# Patient Record
Sex: MALE | Race: WHITE | Employment: OTHER | URBAN - NONMETROPOLITAN AREA
[De-identification: names, ages, dates, MRNs, and addresses within clinical notes are randomized per-mention and may not be internally consistent; named-entity substitution may affect disease eponyms.]

---

## 2017-09-29 ENCOUNTER — TRANSFERRED RECORDS (OUTPATIENT)
Dept: HEALTH INFORMATION MANAGEMENT | Facility: HOSPITAL | Age: 77
End: 2017-09-29

## 2018-05-23 RX ORDER — TAMSULOSIN HYDROCHLORIDE 0.4 MG/1
0.4 CAPSULE ORAL DAILY
Status: ON HOLD | COMMUNITY
End: 2021-01-01

## 2018-05-23 RX ORDER — LISINOPRIL AND HYDROCHLOROTHIAZIDE 12.5; 2 MG/1; MG/1
1 TABLET ORAL DAILY
Status: ON HOLD | COMMUNITY
End: 2021-01-01

## 2018-05-31 ENCOUNTER — HOSPITAL ENCOUNTER (OUTPATIENT)
Facility: HOSPITAL | Age: 78
Discharge: HOME | End: 2018-05-31
Attending: INTERNAL MEDICINE | Admitting: INTERNAL MEDICINE
Payer: COMMERCIAL

## 2018-05-31 ENCOUNTER — ANESTHESIA (OUTPATIENT)
Dept: SURGERY | Facility: HOSPITAL | Age: 78
End: 2018-05-31
Payer: COMMERCIAL

## 2018-05-31 ENCOUNTER — SURGERY (OUTPATIENT)
Age: 78
End: 2018-05-31

## 2018-05-31 ENCOUNTER — ANESTHESIA EVENT (OUTPATIENT)
Dept: SURGERY | Facility: HOSPITAL | Age: 78
End: 2018-05-31
Payer: COMMERCIAL

## 2018-05-31 VITALS
SYSTOLIC BLOOD PRESSURE: 183 MMHG | OXYGEN SATURATION: 90 % | BODY MASS INDEX: 31.06 KG/M2 | HEIGHT: 75 IN | RESPIRATION RATE: 18 BRPM | DIASTOLIC BLOOD PRESSURE: 125 MMHG | TEMPERATURE: 98 F | WEIGHT: 249.8 LBS

## 2018-05-31 LAB — GLUCOSE BLDC GLUCOMTR-MCNC: 153 MG/DL (ref 70–99)

## 2018-05-31 PROCEDURE — 40000306 ZZH STATISTIC PRE PROC ASSESS II: Performed by: INTERNAL MEDICINE

## 2018-05-31 PROCEDURE — 99100 ANES PT EXTEME AGE<1 YR&>70: CPT | Performed by: NURSE ANESTHETIST, CERTIFIED REGISTERED

## 2018-05-31 PROCEDURE — 82962 GLUCOSE BLOOD TEST: CPT

## 2018-05-31 PROCEDURE — 45385 COLONOSCOPY W/LESION REMOVAL: CPT | Performed by: ANESTHESIOLOGY

## 2018-05-31 PROCEDURE — 25000125 ZZHC RX 250: Performed by: NURSE ANESTHETIST, CERTIFIED REGISTERED

## 2018-05-31 PROCEDURE — 71000027 ZZH RECOVERY PHASE 2 EACH 15 MINS: Performed by: INTERNAL MEDICINE

## 2018-05-31 PROCEDURE — 45385 COLONOSCOPY W/LESION REMOVAL: CPT | Performed by: NURSE ANESTHETIST, CERTIFIED REGISTERED

## 2018-05-31 PROCEDURE — 27210794 ZZH OR GENERAL SUPPLY STERILE: Performed by: INTERNAL MEDICINE

## 2018-05-31 PROCEDURE — 36000050 ZZH SURGERY LEVEL 2 1ST 30 MIN: Performed by: INTERNAL MEDICINE

## 2018-05-31 PROCEDURE — 25000128 H RX IP 250 OP 636: Performed by: NURSE ANESTHETIST, CERTIFIED REGISTERED

## 2018-05-31 PROCEDURE — 37000008 ZZH ANESTHESIA TECHNICAL FEE, 1ST 30 MIN: Performed by: INTERNAL MEDICINE

## 2018-05-31 PROCEDURE — 25000128 H RX IP 250 OP 636: Performed by: ANESTHESIOLOGY

## 2018-05-31 PROCEDURE — 88305 TISSUE EXAM BY PATHOLOGIST: CPT | Mod: TC | Performed by: INTERNAL MEDICINE

## 2018-05-31 RX ORDER — ONDANSETRON 2 MG/ML
4 INJECTION INTRAMUSCULAR; INTRAVENOUS EVERY 30 MIN PRN
Status: DISCONTINUED | OUTPATIENT
Start: 2018-05-31 | End: 2018-05-31 | Stop reason: HOSPADM

## 2018-05-31 RX ORDER — LIDOCAINE HYDROCHLORIDE 20 MG/ML
INJECTION, SOLUTION INFILTRATION; PERINEURAL PRN
Status: DISCONTINUED | OUTPATIENT
Start: 2018-05-31 | End: 2018-05-31

## 2018-05-31 RX ORDER — ALBUTEROL SULFATE 0.83 MG/ML
2.5 SOLUTION RESPIRATORY (INHALATION) EVERY 4 HOURS PRN
Status: DISCONTINUED | OUTPATIENT
Start: 2018-05-31 | End: 2018-05-31 | Stop reason: HOSPADM

## 2018-05-31 RX ORDER — MEPERIDINE HYDROCHLORIDE 25 MG/ML
12.5 INJECTION INTRAMUSCULAR; INTRAVENOUS; SUBCUTANEOUS
Status: DISCONTINUED | OUTPATIENT
Start: 2018-05-31 | End: 2018-05-31 | Stop reason: HOSPADM

## 2018-05-31 RX ORDER — SODIUM CHLORIDE, SODIUM LACTATE, POTASSIUM CHLORIDE, CALCIUM CHLORIDE 600; 310; 30; 20 MG/100ML; MG/100ML; MG/100ML; MG/100ML
INJECTION, SOLUTION INTRAVENOUS CONTINUOUS
Status: DISCONTINUED | OUTPATIENT
Start: 2018-05-31 | End: 2018-05-31 | Stop reason: HOSPADM

## 2018-05-31 RX ORDER — HYDRALAZINE HYDROCHLORIDE 20 MG/ML
2.5-5 INJECTION INTRAMUSCULAR; INTRAVENOUS EVERY 10 MIN PRN
Status: DISCONTINUED | OUTPATIENT
Start: 2018-05-31 | End: 2018-05-31 | Stop reason: HOSPADM

## 2018-05-31 RX ORDER — PROMETHAZINE HYDROCHLORIDE 25 MG/ML
12.5 INJECTION, SOLUTION INTRAMUSCULAR; INTRAVENOUS
Status: DISCONTINUED | OUTPATIENT
Start: 2018-05-31 | End: 2018-05-31 | Stop reason: HOSPADM

## 2018-05-31 RX ORDER — PROPOFOL 10 MG/ML
INJECTION, EMULSION INTRAVENOUS PRN
Status: DISCONTINUED | OUTPATIENT
Start: 2018-05-31 | End: 2018-05-31

## 2018-05-31 RX ORDER — ONDANSETRON 4 MG/1
4 TABLET, ORALLY DISINTEGRATING ORAL EVERY 30 MIN PRN
Status: DISCONTINUED | OUTPATIENT
Start: 2018-05-31 | End: 2018-05-31 | Stop reason: HOSPADM

## 2018-05-31 RX ORDER — ONDANSETRON 2 MG/ML
4 INJECTION INTRAMUSCULAR; INTRAVENOUS
Status: DISCONTINUED | OUTPATIENT
Start: 2018-05-31 | End: 2018-05-31 | Stop reason: HOSPADM

## 2018-05-31 RX ORDER — DEXAMETHASONE SODIUM PHOSPHATE 4 MG/ML
4 INJECTION, SOLUTION INTRA-ARTICULAR; INTRALESIONAL; INTRAMUSCULAR; INTRAVENOUS; SOFT TISSUE EVERY 10 MIN PRN
Status: DISCONTINUED | OUTPATIENT
Start: 2018-05-31 | End: 2018-05-31 | Stop reason: HOSPADM

## 2018-05-31 RX ORDER — FENTANYL CITRATE 50 UG/ML
25-50 INJECTION, SOLUTION INTRAMUSCULAR; INTRAVENOUS
Status: DISCONTINUED | OUTPATIENT
Start: 2018-05-31 | End: 2018-05-31 | Stop reason: HOSPADM

## 2018-05-31 RX ORDER — LIDOCAINE 40 MG/G
CREAM TOPICAL
Status: DISCONTINUED | OUTPATIENT
Start: 2018-05-31 | End: 2018-05-31 | Stop reason: HOSPADM

## 2018-05-31 RX ORDER — NALOXONE HYDROCHLORIDE 0.4 MG/ML
.1-.4 INJECTION, SOLUTION INTRAMUSCULAR; INTRAVENOUS; SUBCUTANEOUS
Status: DISCONTINUED | OUTPATIENT
Start: 2018-05-31 | End: 2018-05-31 | Stop reason: HOSPADM

## 2018-05-31 RX ADMIN — PROPOFOL 10 MG: 10 INJECTION, EMULSION INTRAVENOUS at 09:28

## 2018-05-31 RX ADMIN — LIDOCAINE HYDROCHLORIDE 40 MG: 20 INJECTION, SOLUTION INFILTRATION; PERINEURAL at 09:19

## 2018-05-31 RX ADMIN — SODIUM CHLORIDE, POTASSIUM CHLORIDE, SODIUM LACTATE AND CALCIUM CHLORIDE: 600; 310; 30; 20 INJECTION, SOLUTION INTRAVENOUS at 09:11

## 2018-05-31 RX ADMIN — PROPOFOL 20 MG: 10 INJECTION, EMULSION INTRAVENOUS at 09:22

## 2018-05-31 RX ADMIN — PROPOFOL 20 MG: 10 INJECTION, EMULSION INTRAVENOUS at 09:32

## 2018-05-31 RX ADMIN — PROPOFOL 20 MG: 10 INJECTION, EMULSION INTRAVENOUS at 09:34

## 2018-05-31 RX ADMIN — PROPOFOL 10 MG: 10 INJECTION, EMULSION INTRAVENOUS at 09:26

## 2018-05-31 RX ADMIN — PROPOFOL 20 MG: 10 INJECTION, EMULSION INTRAVENOUS at 09:30

## 2018-05-31 RX ADMIN — PROPOFOL 20 MG: 10 INJECTION, EMULSION INTRAVENOUS at 09:36

## 2018-05-31 RX ADMIN — PROPOFOL 20 MG: 10 INJECTION, EMULSION INTRAVENOUS at 09:20

## 2018-05-31 RX ADMIN — PROPOFOL 10 MG: 10 INJECTION, EMULSION INTRAVENOUS at 09:24

## 2018-05-31 RX ADMIN — PROPOFOL 30 MG: 10 INJECTION, EMULSION INTRAVENOUS at 09:19

## 2018-05-31 RX ADMIN — PROPOFOL 20 MG: 10 INJECTION, EMULSION INTRAVENOUS at 09:35

## 2018-05-31 NOTE — OR NURSING
Pateint discharged to home .  Madelin score 20. Pain level 0/10.  Discharged from unit via walking .

## 2018-05-31 NOTE — IP AVS SNAPSHOT
MRN:[id number]                      After Visit Summary   5/31/2018    Florentin Reyes    MRN: [id number]           Thank you!     Thank you for choosing [facility] for your care. Our goal is always to provide you with excellent care. Hearing back from our patients is one way we can continue to improve our services. Please take a few minutes to complete the written survey that you may receive in the mail after you visit with us. Thank you!        Patient Information     Date Of Birth          1940        About your hospital stay     You were admitted on:  May 31, 2018 You last received care in the:  [facility] Preop/Phase II    You were discharged on:  May 31, 2018       Who to Call     For medical emergencies, please call 911.  For non-urgent questions about your medical care, please call your primary care provider or clinic, [phone]  For questions related to your surgery, please call your surgery clinic        Attending Provider     Provider Specialty    Roldan Vila MD Gastroenterology       Primary Care Provider Office Phone # Fax #    Pradeep Fuller [phone]      Further instructions from your care team           INSTRUCTIONS AFTER COLONOSCOPY    WHEN YOU ARE BACK HOME:    Plan to rest for an hour or two after you get home.    You may have some cramping or pressure until you pass gas.    You may resume your regular medications.    Eat a small, light meal at first, and then gradually return to normal meal sizes.  If you had a polyp removed:    Slight bleeding may occur.  You may have a slight blood stain on the toilet paper after a bowel movement.    To lessen the chance of bleeding, avoid heavy exercise for ONE WEEK.  This includes heavy lifting, vigorous sport activities, and heavy physical labor.  You may resume your normal sexual activity.      Avoid aspirin or aspirin products if instructed by your doctor.    WHAT TO WATCH FOR:  Problems rarely occur after the  "exam; however, it is important for you to watch for early signs of possible problems.  If you have     Unusual pain in your abdomen    Nausea and vomiting that persists    Excessive bleeding    Black or bloody bowel movements    Fever or temperature above 100.6 F  Please call your doctor ([facility] [phone]) or go to the nearest hospital emergency room.    Post-Anesthesia Patient Instructions    IMMEDIATELY FOLLOWING SURGERY:  Do not drive or operate machinery for the first twenty four hours after surgery.  Do not make any important decisions for twenty four hours after surgery or while taking narcotic pain medications or sedatives.  If you develop intractable nausea and vomiting or a severe headache please notify your doctor immediately.    FOLLOW-UP:  Please make an appointment with your surgeon as instructed. You do not need to follow up with anesthesia unless specifically instructed to do so.    WOUND CARE INSTRUCTIONS (if applicable):  Keep a dry clean dressing on the anesthesia/puncture wound site if there is drainage.  Once the wound has quit draining you may leave it open to air.  Generally you should leave the bandage intact for twenty four hours unless there is drainage.  If the epidural site drains for more than 36-48 hours please call the anesthesia department.    QUESTIONS?:  Please feel free to call your physician or the hospital  if you have any questions, and they will be happy to assist you.       Pending Results     No orders found from 5/29/2018 to 6/1/2018.            Admission Information     Date & Time Provider Department Dept. Phone    5/31/2018 Roldan Vila MD HI Preop/Phase [phone]      Your Vitals Were     Blood Pressure Temperature Respirations Height Weight Pulse Oximetry    191/106 98  F (36.7  C) (Oral) 18 1.905 m (6' 3\") 113.3 kg (249 lb 12.8 oz) 96%    BMI (Body Mass Index)                   31.22 kg/m2           MyChart Information     MyChart lets you " "send messages to your doctor, view your test results, renew your prescriptions, schedule appointments and more. To sign up, go to [email or web address] . Click on \"Log in\" on the left side of the screen, which will take you to the Welcome page. Then click on \"Sign up Now\" on the right side of the page.     You will be asked to enter the access code listed below, as well as some personal information. Please follow the directions to create your username and password.     Your access code is: [email or web address]  Expires: 2018  9:54 AM     Your access code will  in 90 days. If you need help or a new code, please call your [facility] clinic or [phone].        Care EveryWhere ID     This is your Care EveryWhere ID. This could be used by other organizations to access your [facility] medical records  [id number]        Equal Access to Services     NOREEN DOBSON : Yan Eaton, wadimple franco, qaash kaalmajamie nathan, rani coello . So [facility] [phone].    ATENCIÓN: Si habla español, tiene a neal disposición servicios gratuitos de asistencia lingüística. Jordi al [phone].    We comply with applicable federal civil rights laws and [place] laws. We do not discriminate on the basis of race, color, national origin, age, disability, sex, sexual orientation, or gender identity.               Review of your medicines      CONTINUE these medicines which have NOT CHANGED        Dose / Directions    FLOMAX 0.4 MG capsule   Generic drug:  tamsulosin        Dose:  0.4 mg   Take 0.4 mg by mouth daily   Refills:  0       GLIPIZIDE PO        Dose:  5 mg   Take 5 mg by mouth every morning (before breakfast)   Refills:  0       insulin glargine 100 UNIT/ML injection   Commonly known as:  LANTUS        Dose:  40 Units   Inject 40 Units Subcutaneous every morning   Refills:  0       lisinopril-hydrochlorothiazide 20-12.5 MG per tablet   Commonly known as:  PRINZIDE/ZESTORETIC        " Dose:  1 tablet   Take 1 tablet by mouth daily   Refills:  0       METFORMIN HCL PO        Dose:  1000 mg   Take 1,000 mg by mouth 2 times daily (with meals)   Refills:  0       RISPERDAL PO        Dose:  0.5 mg   Take 0.5 mg by mouth 2 times daily   Refills:  0       SIMVASTATIN PO        Dose:  40 mg   Take 40 mg by mouth At Bedtime   Refills:  0                Protect others around you: Learn how to safely use, store and throw away your medicines at www.disposemymeds.org.             Medication List: This is a list of all your medications and when to take them. Check marks below indicate your daily home schedule. Keep this list as a reference.      Medications           Morning Afternoon Evening Bedtime As Needed    FLOMAX 0.4 MG capsule   Take 0.4 mg by mouth daily   Generic drug:  tamsulosin                                GLIPIZIDE PO   Take 5 mg by mouth every morning (before breakfast)                                insulin glargine 100 UNIT/ML injection   Commonly known as:  LANTUS   Inject 40 Units Subcutaneous every morning                                lisinopril-hydrochlorothiazide 20-12.5 MG per tablet   Commonly known as:  PRINZIDE/ZESTORETIC   Take 1 tablet by mouth daily                                METFORMIN HCL PO   Take 1,000 mg by mouth 2 times daily (with meals)                                RISPERDAL PO   Take 0.5 mg by mouth 2 times daily                                SIMVASTATIN PO   Take 40 mg by mouth At Bedtime

## 2018-05-31 NOTE — DISCHARGE INSTRUCTIONS
INSTRUCTIONS AFTER COLONOSCOPY    WHEN YOU ARE BACK HOME:    Plan to rest for an hour or two after you get home.    You may have some cramping or pressure until you pass gas.    You may resume your regular medications.    Eat a small, light meal at first, and then gradually return to normal meal sizes.  If you had a polyp removed:    Slight bleeding may occur.  You may have a slight blood stain on the toilet paper after a bowel movement.    To lessen the chance of bleeding, avoid heavy exercise for ONE WEEK.  This includes heavy lifting, vigorous sport activities, and heavy physical labor.  You may resume your normal sexual activity.      Avoid aspirin or aspirin products if instructed by your doctor.    WHAT TO WATCH FOR:  Problems rarely occur after the exam; however, it is important for you to watch for early signs of possible problems.  If you have     Unusual pain in your abdomen    Nausea and vomiting that persists    Excessive bleeding    Black or bloody bowel movements    Fever or temperature above 100.6 F  Please call your doctor ([facility] [phone]) or go to the nearest hospital emergency room.    Post-Anesthesia Patient Instructions    IMMEDIATELY FOLLOWING SURGERY:  Do not drive or operate machinery for the first twenty four hours after surgery.  Do not make any important decisions for twenty four hours after surgery or while taking narcotic pain medications or sedatives.  If you develop intractable nausea and vomiting or a severe headache please notify your doctor immediately.    FOLLOW-UP:  Please make an appointment with your surgeon as instructed. You do not need to follow up with anesthesia unless specifically instructed to do so.    WOUND CARE INSTRUCTIONS (if applicable):  Keep a dry clean dressing on the anesthesia/puncture wound site if there is drainage.  Once the wound has quit draining you may leave it open to air.  Generally you should leave the bandage intact for twenty four  hours unless there is drainage.  If the epidural site drains for more than 36-48 hours please call the anesthesia department.    QUESTIONS?:  Please feel free to call your physician or the hospital  if you have any questions, and they will be happy to assist you.

## 2018-05-31 NOTE — ANESTHESIA PREPROCEDURE EVALUATION
Anesthesia Evaluation     . Pt has had prior anesthetic.            ROS/MED HX    ENT/Pulmonary:  - neg pulmonary ROS     Neurologic:     (+)other neuro hearing loss    Cardiovascular:     (+) Dyslipidemia, hypertension-range: not on beta blocker, ---. : . . . :. .       METS/Exercise Tolerance:     Hematologic:  - neg hematologic  ROS       Musculoskeletal:   (+) arthritis, , , -       GI/Hepatic:     (+) bowel prep,       Renal/Genitourinary:     (+) BPH,       Endo:     (+) type II DM Using insulin Normal glucose range: 153 Obesity, .      Psychiatric:     (+) psychiatric history bipolar      Infectious Disease:  - neg infectious disease ROS       Malignancy:      - no malignancy   Other:    - neg other ROS                 Physical Exam      Airway   Mallampati: III  TM distance: >3 FB  Neck ROM: full    Dental   (+) upper dentures and missing    Cardiovascular   Rhythm and rate: regular and normal      Pulmonary    breath sounds clear to auscultation                    Anesthesia Plan      History & Physical Review  History and physical reviewed and following examination; no interval change.    ASA Status:  2 .    NPO Status:  > 8 hours    Plan for MAC with Intravenous and Propofol induction. Maintenance will be TIVA.  Reason for MAC:  Chronic cardiopulmonary disease (G9) and Other - see comments  PONV prophylaxis:  Ondansetron (or other 5HT-3)  Surgeon requests deep sedation. Patient has advanced age > 72.  Will provide MAC. Primary Physician H&P on 11/3/2017 Identifiable in chart and my own H&P from today used for preop creation      Postoperative Care  Postoperative pain management:  IV analgesics.      Consents  Anesthetic plan, risks, benefits and alternatives discussed with:  Patient..                          .

## 2018-05-31 NOTE — IP AVS SNAPSHOT
HI Preop/Phase II    [address] [address]    Phone:  [phone]                                       After Visit Summary   5/31/2018    Florentin Reyes    MRN: [id number]           After Visit Summary Signature Page     I have received my discharge instructions, and my questions have been answered. I have discussed any challenges I see with this plan with the nurse or doctor.    ..........................................................................................................................................  Patient/Patient Representative Signature      ..........................................................................................................................................  Patient Representative Print Name and Relationship to Patient    ..................................................               ................................................  Date                                            Time    ..........................................................................................................................................  Reviewed by Signature/Title    ...................................................              ..............................................  Date                                                            Time

## 2018-05-31 NOTE — ANESTHESIA POSTPROCEDURE EVALUATION
Patient: Florentin Reyes    Procedure(s):  COLONOSCOPY WITH POLYPECTOMY - Wound Class: II-Clean Contaminated    Diagnosis:PERSONAL HX COLON POLYPS  Diagnosis Additional Information: No value filed.    Anesthesia Type:  MAC    Note:  Anesthesia Post Evaluation    Patient location during evaluation: Phase 2 and Bedside  Patient participation: Able to fully participate in evaluation  Level of consciousness: awake and alert  Pain management: adequate  Airway patency: patent  Cardiovascular status: acceptable  Respiratory status: acceptable  Hydration status: stable  PONV: none     Anesthetic complications: None          Last vitals:  Vitals:    05/31/18 0950 05/31/18 0955 05/31/18 1000   BP: (!) 191/106 (!) 163/111 (!) 150/103   Resp:      Temp:      SpO2: 96% 93% 92%         Electronically Signed By: Bryan Henry MD  May 31, 2018  10:10 AM

## 2018-05-31 NOTE — CONSULTS
Pre-Endoscopy History and Physical     Florentin Reyes MRN# [id number]   YOB: 1940 Age: 77 year old     Primary care provider: Pradeep Fuller  Type of Endoscopy: Colonoscopy with possible biopsy, possible polypectomy  Reason for Procedure: History of polyps  Type of Anesthesia Anticipated: MAC    HPI:    Florentin is a 77 year old male who will be undergoing the above procedure.      A history and physical has been performed. The patient's medications and allergies have been reviewed. The risks and benefits of the procedure and the sedation options and risks were discussed with the patient.  All questions were answered and informed consent was obtained.      He denies a personal or family history of anesthesia complications or bleeding disorders.     There is no problem list on file for this patient.       No past medical history on file.     History reviewed. No pertinent surgical history.    Social History   Substance Use Topics     Smoking status: Not on file     Smokeless tobacco: Not on file     Alcohol use Not on file       History reviewed. No pertinent family history.    Prior to Admission medications    Medication Sig Start Date End Date Taking? Authorizing Provider   insulin glargine (LANTUS SOLOSTAR) 100 UNIT/ML pen Inject 40 Units Subcutaneous every morning   Yes Reported, Patient   lisinopril-hydrochlorothiazide (PRINZIDE/ZESTORETIC) 20-12.5 MG per tablet Take 1 tablet by mouth daily   Yes Reported, Patient   METFORMIN HCL PO Take 1,000 mg by mouth 2 times daily (with meals)   Yes Reported, Patient   RisperiDONE (RISPERDAL PO) Take 0.5 mg by mouth 2 times daily   Yes Reported, Patient   SIMVASTATIN PO Take 40 mg by mouth At Bedtime   Yes Reported, Patient   tamsulosin (FLOMAX) 0.4 MG capsule Take 0.4 mg by mouth daily   Yes Reported, Patient   GLIPIZIDE PO Take 5 mg by mouth every morning (before breakfast)     Reported, Patient       No Known Allergies     REVIEW OF SYSTEMS:   5 point ROS  negative except as noted above in HPI, including Gen., Resp., CV, GI &  system review.    PHYSICAL EXAM:   There were no vitals taken for this visit. There is no height or weight on file to calculate BMI.   GENERAL APPEARANCE: alert, and oriented  MENTAL STATUS: alert  AIRWAY EXAM: Mallampatti Class II (visualization of the soft palate, fauces, and uvula)  RESP: lungs clear to auscultation - no rales, rhonchi or wheezes  CV: regular rates and rhythm  DIAGNOSTICS:    Not indicated    IMPRESSION   ASA Class 2 - Mild systemic disease    PLAN:   Plan for Colonoscopy with possible biopsy, possible polypectomy. We discussed the risks, benefits and alternatives and the patient wished to proceed.    The above has been forwarded to the consulting provider.      Signed Electronically by: Roldan Vila MD  May 31, 2018

## 2018-05-31 NOTE — ANESTHESIA CARE TRANSFER NOTE
Patient: Florentin Reyes    Procedure(s):  COLONOSCOPY WITH POLYPECTOMY - Wound Class: II-Clean Contaminated    Diagnosis: PERSONAL HX COLON POLYPS  Diagnosis Additional Information: No value filed.    Anesthesia Type:   MAC     Note:  Airway :Nasal Cannula  Patient transferred to:Phase II  Handoff Report: Identifed the Patient, Identified the Reponsible Provider, Reviewed the pertinent medical history, Discussed the surgical course, Reviewed Intra-OP anesthesia mangement and issues during anesthesia, Set expectations for post-procedure period and Allowed opportunity for questions and acknowledgement of understanding      Vitals: (Last set prior to Anesthesia Care Transfer)    CRNA VITALS  5/31/2018 0912 - 5/31/2018 0943      5/31/2018             Resp Rate (set): 8                Electronically Signed By: VIET Hurtado CRNA  May 31, 2018  9:43 AM

## 2018-05-31 NOTE — OP NOTE
Flroentin Reyes MRN# [id number]   YOB: 1940 Age: 77 year old      Date of Admission:  5/31/2018    Primary care provider: Pradeep Fuller    PREOPERATIVE DIAGNOSIS:  History of polyps       POSTOPERATIVE DIAGNOSIS:  Rectal polyps X 2, internal hemorrhoids         PROCEDURE:  Colonoscopy         INDICATIONS:  Screening colonoscopy.       SURGEON: Roldan Vila MD    DESCRIPTION OF PROCEDURE: Florentin Reyes was brought into the endoscopy suite and placed in the left lateral decubitus position. After preprocedural pause and attended monitored anesthesia was administered, the external anus was inspected and was normal. Digital rectal exam was normal. The colonoscope was inserted and advanced under direct visualization to the level of the cecum which was identified by the appendiceal orifice and the ileocecal valve. The prep was good. Upon slow withdrawal of the colonoscope, approximately 95% of the mucosa was directly visualized. There were two - 3-4 mm polyps in the rectum removed with cold forceps.  The rest of the colon was without mucosal abnormality. There was no evidence of further polyps, diverticula, inflammation, bleeding or AVMs. Retroflexion of the rectum revealed internal hemorrhoids. . The extra air was removed from the colon, and the colonoscope withdrawn. The patient tolerated the procedure well and was taken to postanesthesia care unit.     Recommend the patient to return in 5 years for follow up colonoscopy as health permits.    Roldan Vila MD

## 2018-06-04 LAB — COPATH REPORT: NORMAL

## 2019-07-15 ENCOUNTER — HOSPITAL ENCOUNTER (OUTPATIENT)
Dept: MRI IMAGING | Facility: HOSPITAL | Age: 79
Discharge: HOME | End: 2019-07-15
Attending: FAMILY MEDICINE | Admitting: FAMILY MEDICINE
Payer: COMMERCIAL

## 2019-07-15 DIAGNOSIS — R27.8 OTHER LACK OF COORDINATION: ICD-10-CM

## 2019-07-15 PROCEDURE — 25500064 ZZH RX 255 OP 636: Performed by: RADIOLOGY

## 2019-07-15 PROCEDURE — 70553 MRI BRAIN STEM W/O & W/DYE: CPT | Mod: TC

## 2019-07-15 PROCEDURE — A9585 GADOBUTROL INJECTION: HCPCS | Performed by: RADIOLOGY

## 2019-07-15 RX ORDER — GADOBUTROL 604.72 MG/ML
10 INJECTION INTRAVENOUS ONCE
Status: COMPLETED | OUTPATIENT
Start: 2019-07-15 | End: 2019-07-15

## 2019-07-15 RX ADMIN — GADOBUTROL 10 ML: 604.72 INJECTION INTRAVENOUS at 09:44

## 2020-01-01 ENCOUNTER — OFFICE VISIT (OUTPATIENT)
Dept: WOUND CARE | Facility: OTHER | Age: 80
End: 2020-01-01
Attending: CLINICAL NURSE SPECIALIST
Payer: COMMERCIAL

## 2020-01-01 ENCOUNTER — HOSPITAL ENCOUNTER (EMERGENCY)
Facility: HOSPITAL | Age: 80
Discharge: HOME | End: 2020-10-15
Attending: PHYSICIAN ASSISTANT | Admitting: PHYSICIAN ASSISTANT
Payer: COMMERCIAL

## 2020-01-01 ENCOUNTER — OFFICE VISIT (OUTPATIENT)
Dept: WOUND CARE | Facility: OTHER | Age: 80
End: 2020-01-01
Attending: NURSE PRACTITIONER
Payer: COMMERCIAL

## 2020-01-01 ENCOUNTER — HOSPITAL PATHOLOGY (OUTPATIENT)
Dept: OTHER | Facility: CLINIC | Age: 80
End: 2020-01-01

## 2020-01-01 ENCOUNTER — TELEPHONE (OUTPATIENT)
Dept: WOUND CARE | Facility: OTHER | Age: 80
End: 2020-01-01

## 2020-01-01 ENCOUNTER — OFFICE VISIT (OUTPATIENT)
Dept: DERMATOLOGY | Facility: OTHER | Age: 80
End: 2020-01-01
Attending: DERMATOLOGY
Payer: COMMERCIAL

## 2020-01-01 VITALS
WEIGHT: 260 LBS | DIASTOLIC BLOOD PRESSURE: 92 MMHG | HEART RATE: 97 BPM | BODY MASS INDEX: 32.33 KG/M2 | SYSTOLIC BLOOD PRESSURE: 154 MMHG | TEMPERATURE: 98 F | OXYGEN SATURATION: 96 % | HEIGHT: 75 IN

## 2020-01-01 VITALS
WEIGHT: 260 LBS | BODY MASS INDEX: 35.21 KG/M2 | DIASTOLIC BLOOD PRESSURE: 64 MMHG | SYSTOLIC BLOOD PRESSURE: 138 MMHG | HEART RATE: 90 BPM | TEMPERATURE: 97 F | HEIGHT: 72 IN | OXYGEN SATURATION: 98 %

## 2020-01-01 VITALS
SYSTOLIC BLOOD PRESSURE: 132 MMHG | TEMPERATURE: 96.9 F | OXYGEN SATURATION: 100 % | HEIGHT: 72 IN | DIASTOLIC BLOOD PRESSURE: 82 MMHG | WEIGHT: 260 LBS | BODY MASS INDEX: 35.21 KG/M2 | HEART RATE: 106 BPM

## 2020-01-01 VITALS
RESPIRATION RATE: 20 BRPM | DIASTOLIC BLOOD PRESSURE: 82 MMHG | HEART RATE: 102 BPM | OXYGEN SATURATION: 96 % | SYSTOLIC BLOOD PRESSURE: 136 MMHG | TEMPERATURE: 98.2 F

## 2020-01-01 VITALS
HEART RATE: 91 BPM | SYSTOLIC BLOOD PRESSURE: 122 MMHG | BODY MASS INDEX: 35.26 KG/M2 | DIASTOLIC BLOOD PRESSURE: 76 MMHG | TEMPERATURE: 96.7 F | WEIGHT: 260 LBS | OXYGEN SATURATION: 96 %

## 2020-01-01 VITALS — TEMPERATURE: 96.9 F | OXYGEN SATURATION: 98 % | HEART RATE: 96 BPM | RESPIRATION RATE: 18 BRPM

## 2020-01-01 VITALS
OXYGEN SATURATION: 95 % | BODY MASS INDEX: 32.33 KG/M2 | WEIGHT: 260 LBS | DIASTOLIC BLOOD PRESSURE: 78 MMHG | HEIGHT: 75 IN | TEMPERATURE: 98.4 F | SYSTOLIC BLOOD PRESSURE: 116 MMHG | HEART RATE: 106 BPM

## 2020-01-01 VITALS
HEART RATE: 93 BPM | SYSTOLIC BLOOD PRESSURE: 112 MMHG | DIASTOLIC BLOOD PRESSURE: 62 MMHG | TEMPERATURE: 97.9 F | OXYGEN SATURATION: 98 % | RESPIRATION RATE: 16 BRPM

## 2020-01-01 DIAGNOSIS — S31.819D OPEN WOUND OF RIGHT BUTTOCK, SUBSEQUENT ENCOUNTER: Primary | ICD-10-CM

## 2020-01-01 DIAGNOSIS — L30.9 ECZEMA, UNSPECIFIED TYPE: ICD-10-CM

## 2020-01-01 DIAGNOSIS — S31.819S: Primary | ICD-10-CM

## 2020-01-01 DIAGNOSIS — L89.313 PRESSURE INJURY OF RIGHT BUTTOCK, STAGE 3 (H): Primary | ICD-10-CM

## 2020-01-01 DIAGNOSIS — R21 RASH: ICD-10-CM

## 2020-01-01 DIAGNOSIS — S31.819S: ICD-10-CM

## 2020-01-01 DIAGNOSIS — B37.2 CANDIDIASIS OF SKIN: ICD-10-CM

## 2020-01-01 DIAGNOSIS — R21 RASH: Primary | ICD-10-CM

## 2020-01-01 DIAGNOSIS — R21 RASH AND NONSPECIFIC SKIN ERUPTION: ICD-10-CM

## 2020-01-01 DIAGNOSIS — L30.9 DERMATITIS: ICD-10-CM

## 2020-01-01 LAB
BACTERIA SPEC CULT: ABNORMAL
BACTERIA SPEC CULT: ABNORMAL
COPATH REPORT: NORMAL
COPATH REPORT: NORMAL
GRAM STN SPEC: ABNORMAL
GRAM STN SPEC: ABNORMAL
KOH PREP SPEC: ABNORMAL
KOH PREP SPEC: NORMAL
KOH PREP SPEC: NORMAL
SPECIMEN SOURCE: ABNORMAL
SPECIMEN SOURCE: NORMAL
SPECIMEN SOURCE: NORMAL

## 2020-01-01 PROCEDURE — G0463 HOSPITAL OUTPT CLINIC VISIT: HCPCS

## 2020-01-01 PROCEDURE — 99213 OFFICE O/P EST LOW 20 MIN: CPT | Performed by: NURSE PRACTITIONER

## 2020-01-01 PROCEDURE — G0463 HOSPITAL OUTPT CLINIC VISIT: HCPCS | Mod: 25

## 2020-01-01 PROCEDURE — 87070 CULTURE OTHR SPECIMN AEROBIC: CPT | Mod: ZL | Performed by: NURSE PRACTITIONER

## 2020-01-01 PROCEDURE — 87186 SC STD MICRODIL/AGAR DIL: CPT | Mod: ZL | Performed by: NURSE PRACTITIONER

## 2020-01-01 PROCEDURE — 99213 OFFICE O/P EST LOW 20 MIN: CPT | Performed by: CLINICAL NURSE SPECIALIST

## 2020-01-01 PROCEDURE — 11042 DBRDMT SUBQ TIS 1ST 20SQCM/<: CPT | Performed by: CLINICAL NURSE SPECIALIST

## 2020-01-01 PROCEDURE — 99214 OFFICE O/P EST MOD 30 MIN: CPT | Performed by: NURSE PRACTITIONER

## 2020-01-01 PROCEDURE — 11102 TANGNTL BX SKIN SINGLE LES: CPT | Performed by: DERMATOLOGY

## 2020-01-01 PROCEDURE — 87205 SMEAR GRAM STAIN: CPT | Mod: ZL | Performed by: NURSE PRACTITIONER

## 2020-01-01 PROCEDURE — 87220 TISSUE EXAM FOR FUNGI: CPT | Mod: 59 | Performed by: PHYSICIAN ASSISTANT

## 2020-01-01 PROCEDURE — 99213 OFFICE O/P EST LOW 20 MIN: CPT | Performed by: PHYSICIAN ASSISTANT

## 2020-01-01 PROCEDURE — 87077 CULTURE AEROBIC IDENTIFY: CPT | Mod: ZL | Performed by: NURSE PRACTITIONER

## 2020-01-01 PROCEDURE — 99213 OFFICE O/P EST LOW 20 MIN: CPT | Mod: 25 | Performed by: CLINICAL NURSE SPECIALIST

## 2020-01-01 PROCEDURE — 99202 OFFICE O/P NEW SF 15 MIN: CPT | Mod: 25 | Performed by: DERMATOLOGY

## 2020-01-01 PROCEDURE — 88305 TISSUE EXAM BY PATHOLOGIST: CPT | Mod: TC | Performed by: DERMATOLOGY

## 2020-01-01 PROCEDURE — 88312 SPECIAL STAINS GROUP 1: CPT | Mod: TC | Performed by: DERMATOLOGY

## 2020-01-01 PROCEDURE — G0463 HOSPITAL OUTPT CLINIC VISIT: HCPCS | Mod: 25,27

## 2020-01-01 RX ORDER — MULTIVIT WITH MINERALS/LUTEIN
1 TABLET ORAL DAILY
Qty: 90 TABLET | Refills: 3 | Status: ON HOLD | OUTPATIENT
Start: 2020-01-01 | End: 2021-01-01

## 2020-01-01 RX ORDER — LATANOPROST 50 UG/ML
1 SOLUTION/ DROPS OPHTHALMIC AT BEDTIME
Status: ON HOLD | COMMUNITY
End: 2021-01-01

## 2020-01-01 RX ORDER — IBUPROFEN 200 MG
CAPSULE ORAL
Status: ON HOLD | COMMUNITY
Start: 2020-01-29 | End: 2021-01-01

## 2020-01-01 RX ORDER — MUPIROCIN 20 MG/G
OINTMENT TOPICAL 2 TIMES DAILY
Qty: 30 G | Refills: 1 | Status: ON HOLD | OUTPATIENT
Start: 2020-01-01 | End: 2021-01-01

## 2020-01-01 RX ORDER — LANCETS 21 GAUGE
EACH MISCELLANEOUS
Status: ON HOLD | COMMUNITY
Start: 2020-01-01 | End: 2021-01-01

## 2020-01-01 RX ORDER — TIMOLOL MALEATE 5 MG/ML
1 SOLUTION/ DROPS OPHTHALMIC 2 TIMES DAILY
Status: ON HOLD | COMMUNITY
Start: 2020-01-01 | End: 2021-01-01

## 2020-01-01 RX ORDER — CIMETIDINE 400 MG
400 TABLET ORAL 2 TIMES DAILY
Qty: 60 TABLET | Refills: 0 | Status: SHIPPED | OUTPATIENT
Start: 2020-01-01 | End: 2020-01-01

## 2020-01-01 RX ORDER — FLUCONAZOLE 150 MG/1
150 TABLET ORAL WEEKLY
Qty: 4 TABLET | Refills: 0 | Status: SHIPPED | OUTPATIENT
Start: 2020-01-01 | End: 2020-01-01

## 2020-01-01 RX ORDER — LORATADINE 10 MG/1
10 TABLET ORAL DAILY
Status: ON HOLD | COMMUNITY
Start: 2020-01-01 | End: 2021-01-01

## 2020-01-01 RX ORDER — INSULIN ASPART 100 [IU]/ML
0-11 INJECTION, SOLUTION INTRAVENOUS; SUBCUTANEOUS 3 TIMES DAILY
Status: ON HOLD | COMMUNITY
Start: 2020-01-01 | End: 2021-01-01

## 2020-01-01 RX ORDER — PEN NEEDLE, DIABETIC, SAFETY 30 GX3/16"
NEEDLE, DISPOSABLE MISCELLANEOUS
Status: ON HOLD | COMMUNITY
Start: 2020-03-26 | End: 2021-01-01

## 2020-01-01 RX ORDER — SULFAMETHOXAZOLE/TRIMETHOPRIM 800-160 MG
TABLET ORAL
COMMUNITY
Start: 2020-09-16 | End: 2020-01-01

## 2020-01-01 RX ORDER — MUPIROCIN 20 MG/G
OINTMENT TOPICAL 2 TIMES DAILY
COMMUNITY
End: 2020-01-01

## 2020-01-01 RX ORDER — TIMOLOL MALEATE 2.5 MG/ML
1 SOLUTION/ DROPS OPHTHALMIC 2 TIMES DAILY
Status: ON HOLD | COMMUNITY
End: 2021-01-01

## 2020-01-01 RX ORDER — DOXYCYCLINE 100 MG/1
100 CAPSULE ORAL 2 TIMES DAILY
Qty: 14 CAPSULE | Refills: 0 | Status: SHIPPED | OUTPATIENT
Start: 2020-01-01 | End: 2020-01-01

## 2020-01-01 RX ORDER — NYSTATIN AND TRIAMCINOLONE ACETONIDE 100000; 1 [USP'U]/G; MG/G
OINTMENT TOPICAL 2 TIMES DAILY PRN
Status: ON HOLD | COMMUNITY
End: 2021-01-01

## 2020-01-01 RX ORDER — INFLUENZA A VIRUS A/VICTORIA/2454/2019 IVR-207 (H1N1) ANTIGEN (PROPIOLACTONE INACTIVATED), INFLUENZA A VIRUS A/HONG KONG/2671/2019 IVR-208 (H3N2) ANTIGEN (PROPIOLACTONE INACTIVATED), INFLUENZA B VIRUS B/VICTORIA/705/2018 BVR-11 ANTIGEN (PROPIOLACTONE INACTIVATED), INFLUENZA B VIRUS B/PHUKET/3073/2013 BVR-1B ANTIGEN (PROPIOLACTONE INACTIVATED) 15; 15; 15; 15 UG/.5ML; UG/.5ML; UG/.5ML; UG/.5ML
INJECTION, SUSPENSION INTRAMUSCULAR
Status: ON HOLD | COMMUNITY
Start: 2020-01-01 | End: 2021-01-01

## 2020-01-01 RX ORDER — ISOPROPYL ALCOHOL 70 ML/100ML
SWAB TOPICAL
Status: ON HOLD | COMMUNITY
Start: 2020-08-11 | End: 2021-01-01

## 2020-01-01 RX ORDER — TRIAMCINOLONE ACETONIDE 1 MG/G
OINTMENT TOPICAL
Qty: 454 G | Refills: 3 | Status: ON HOLD | OUTPATIENT
Start: 2020-01-01 | End: 2021-01-01

## 2020-01-01 RX ORDER — TRIAMCINOLONE ACETONIDE 1 MG/G
CREAM TOPICAL 2 TIMES DAILY
Qty: 85.2 G | Refills: 0 | Status: SHIPPED | OUTPATIENT
Start: 2020-01-01 | End: 2021-01-01

## 2020-01-01 RX ORDER — ACETAMINOPHEN 325 MG/1
325 TABLET ORAL EVERY 4 HOURS PRN
Status: ON HOLD | COMMUNITY
End: 2021-01-01

## 2020-01-01 RX ORDER — SULFAMETHOXAZOLE/TRIMETHOPRIM 800-160 MG
1 TABLET ORAL 2 TIMES DAILY
Qty: 14 TABLET | Refills: 0 | Status: SHIPPED | OUTPATIENT
Start: 2020-01-01 | End: 2020-01-01

## 2020-01-01 RX ORDER — METFORMIN HCL 500 MG
TABLET, EXTENDED RELEASE 24 HR ORAL
Status: ON HOLD | COMMUNITY
Start: 2020-01-01 | End: 2021-01-01

## 2020-01-01 RX ORDER — ALBUTEROL SULFATE 108 UG/1
AEROSOL, METERED RESPIRATORY (INHALATION)
Status: ON HOLD | COMMUNITY
Start: 2020-01-01 | End: 2021-01-01

## 2020-01-01 RX ORDER — BLOOD-GLUCOSE METER
1 KIT MISCELLANEOUS 2 TIMES DAILY
Status: ON HOLD | COMMUNITY
Start: 2020-03-20 | End: 2021-01-01

## 2020-01-01 RX ORDER — LISINOPRIL 20 MG/1
20 TABLET ORAL DAILY
Status: ON HOLD | COMMUNITY
Start: 2020-01-01 | End: 2021-01-01

## 2020-01-01 RX ORDER — SIMVASTATIN 40 MG
TABLET ORAL
COMMUNITY
Start: 2020-01-01 | End: 2021-01-01

## 2020-01-01 ASSESSMENT — MIFFLIN-ST. JEOR
SCORE: 1937.35
SCORE: 1937.35
SCORE: 1984.98
SCORE: 1984.98

## 2020-01-01 ASSESSMENT — PAIN SCALES - GENERAL
PAINLEVEL: NO PAIN (0)

## 2020-01-01 ASSESSMENT — ENCOUNTER SYMPTOMS
FATIGUE: 0
CHILLS: 0
WOUND: 0
FEVER: 0

## 2020-09-11 ENCOUNTER — APPOINTMENT (OUTPATIENT)
Dept: GENERAL RADIOLOGY | Facility: HOSPITAL | Age: 80
End: 2020-09-11
Attending: PHYSICIAN ASSISTANT
Payer: COMMERCIAL

## 2020-09-11 ENCOUNTER — HOSPITAL ENCOUNTER (EMERGENCY)
Facility: HOSPITAL | Age: 80
Discharge: HOME | End: 2020-09-12
Attending: INTERNAL MEDICINE | Admitting: PHYSICIAN ASSISTANT
Payer: COMMERCIAL

## 2020-09-11 ENCOUNTER — APPOINTMENT (OUTPATIENT)
Dept: CT IMAGING | Facility: HOSPITAL | Age: 80
End: 2020-09-11
Attending: PHYSICIAN ASSISTANT
Payer: COMMERCIAL

## 2020-09-11 DIAGNOSIS — J18.9 PNEUMONIA: ICD-10-CM

## 2020-09-11 DIAGNOSIS — R32 URINARY INCONTINENCE: ICD-10-CM

## 2020-09-11 DIAGNOSIS — R21 RASH AND NONSPECIFIC SKIN ERUPTION: ICD-10-CM
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
## 2020-09-11 DIAGNOSIS — R73.9 HYPERGLYCEMIA: ICD-10-CM

## 2020-09-11 LAB
ALBUMIN SERPL-MCNC: 3.2 G/DL (ref 3.4–5)
ALBUMIN UR-MCNC: 10 MG/DL
ALP SERPL-CCNC: 92 U/L (ref 40–150)
ALT SERPL W P-5'-P-CCNC: 27 U/L (ref 0–70)
ANION GAP SERPL CALCULATED.3IONS-SCNC: 8 MMOL/L (ref 3–14)
APPEARANCE UR: CLEAR
AST SERPL W P-5'-P-CCNC: 19 U/L (ref 0–45)
BACTERIA #/AREA URNS HPF: ABNORMAL /HPF
BASOPHILS # BLD AUTO: 0 10E9/L (ref 0–0.2)
BASOPHILS NFR BLD AUTO: 0.3 %
BILIRUB SERPL-MCNC: 0.2 MG/DL (ref 0.2–1.3)
BILIRUB UR QL STRIP: NEGATIVE
BUN SERPL-MCNC: 24 MG/DL (ref 7–30)
CALCIUM SERPL-MCNC: 10 MG/DL (ref 8.5–10.1)
CHLORIDE SERPL-SCNC: 107 MMOL/L (ref 94–109)
CO2 SERPL-SCNC: 25 MMOL/L (ref 20–32)
COLOR UR AUTO: ABNORMAL
CREAT SERPL-MCNC: 0.87 MG/DL (ref 0.66–1.25)
DIFFERENTIAL METHOD BLD: ABNORMAL
EOSINOPHIL # BLD AUTO: 1 10E9/L (ref 0–0.7)
EOSINOPHIL NFR BLD AUTO: 8 %
ERYTHROCYTE [DISTWIDTH] IN BLOOD BY AUTOMATED COUNT: 13.2 % (ref 10–15)
GFR SERPL CREATININE-BSD FRML MDRD: 83 ML/MIN/{1.73_M2}
GLUCOSE BLDC GLUCOMTR-MCNC: 340 MG/DL (ref 70–99)
GLUCOSE BLDC GLUCOMTR-MCNC: 400 MG/DL (ref 70–99)
GLUCOSE SERPL-MCNC: 424 MG/DL (ref 70–99)
GLUCOSE UR STRIP-MCNC: >1000 MG/DL
HCT VFR BLD AUTO: 38.8 % (ref 40–53)
HGB BLD-MCNC: 13.4 G/DL (ref 13.3–17.7)
HGB UR QL STRIP: ABNORMAL
IMM GRANULOCYTES # BLD: 0.1 10E9/L (ref 0–0.4)
IMM GRANULOCYTES NFR BLD: 0.4 %
KETONES BLD-SCNC: 0.1 MMOL/L (ref 0–0.6)
KETONES UR STRIP-MCNC: NEGATIVE MG/DL
LACTATE BLD-SCNC: 2.6 MMOL/L (ref 0.7–2)
LACTATE BLD-SCNC: 3.2 MMOL/L (ref 0.7–2)
LEUKOCYTE ESTERASE UR QL STRIP: NEGATIVE
LYMPHOCYTES # BLD AUTO: 1.8 10E9/L (ref 0.8–5.3)
LYMPHOCYTES NFR BLD AUTO: 14.7 %
MCH RBC QN AUTO: 31.5 PG (ref 26.5–33)
MCHC RBC AUTO-ENTMCNC: 34.5 G/DL (ref 31.5–36.5)
MCV RBC AUTO: 91 FL (ref 78–100)
MONOCYTES # BLD AUTO: 0.9 10E9/L (ref 0–1.3)
MONOCYTES NFR BLD AUTO: 7.1 %
MUCOUS THREADS #/AREA URNS LPF: PRESENT /LPF
NEUTROPHILS # BLD AUTO: 8.4 10E9/L (ref 1.6–8.3)
NEUTROPHILS NFR BLD AUTO: 69.5 %
NITRATE UR QL: NEGATIVE
NRBC # BLD AUTO: 0 10*3/UL
NRBC BLD AUTO-RTO: 0 /100
PH UR STRIP: 5 PH (ref 4.7–8)
PLATELET # BLD AUTO: 227 10E9/L (ref 150–450)
POTASSIUM SERPL-SCNC: 4.3 MMOL/L (ref 3.4–5.3)
PROCALCITONIN SERPL-MCNC: <0.05 NG/ML
PROT SERPL-MCNC: 7.3 G/DL (ref 6.8–8.8)
RBC # BLD AUTO: 4.25 10E12/L (ref 4.4–5.9)
RBC #/AREA URNS AUTO: <1 /HPF (ref 0–2)
SODIUM SERPL-SCNC: 140 MMOL/L (ref 133–144)
SOURCE: ABNORMAL
SP GR UR STRIP: 1.03 (ref 1–1.03)
TROPONIN I SERPL-MCNC: 0.06 UG/L (ref 0–0.04)
TROPONIN I SERPL-MCNC: 0.08 UG/L (ref 0–0.04)
UROBILINOGEN UR STRIP-MCNC: NORMAL MG/DL (ref 0–2)
WBC # BLD AUTO: 12 10E9/L (ref 4–11)
WBC #/AREA URNS AUTO: 1 /HPF (ref 0–5)

## 2020-09-11 PROCEDURE — 87040 BLOOD CULTURE FOR BACTERIA: CPT | Performed by: PHYSICIAN ASSISTANT

## 2020-09-11 PROCEDURE — 82010 KETONE BODYS QUAN: CPT | Performed by: PHYSICIAN ASSISTANT

## 2020-09-11 PROCEDURE — 85379 FIBRIN DEGRADATION QUANT: CPT | Performed by: INTERNAL MEDICINE

## 2020-09-11 PROCEDURE — 84484 ASSAY OF TROPONIN QUANT: CPT | Performed by: PHYSICIAN ASSISTANT

## 2020-09-11 PROCEDURE — 85025 COMPLETE CBC W/AUTO DIFF WBC: CPT | Performed by: PHYSICIAN ASSISTANT

## 2020-09-11 PROCEDURE — 25800030 ZZH RX IP 258 OP 636: Performed by: PHYSICIAN ASSISTANT

## 2020-09-11 PROCEDURE — 99284 EMERGENCY DEPT VISIT MOD MDM: CPT | Mod: Z6 | Performed by: PHYSICIAN ASSISTANT

## 2020-09-11 PROCEDURE — 96361 HYDRATE IV INFUSION ADD-ON: CPT

## 2020-09-11 PROCEDURE — C9803 HOPD COVID-19 SPEC COLLECT: HCPCS

## 2020-09-11 PROCEDURE — 84145 PROCALCITONIN (PCT): CPT | Performed by: PHYSICIAN ASSISTANT

## 2020-09-11 PROCEDURE — 70450 CT HEAD/BRAIN W/O DYE: CPT | Mod: TC

## 2020-09-11 PROCEDURE — 36415 COLL VENOUS BLD VENIPUNCTURE: CPT | Performed by: PHYSICIAN ASSISTANT

## 2020-09-11 PROCEDURE — 93010 ELECTROCARDIOGRAM REPORT: CPT | Performed by: INTERNAL MEDICINE

## 2020-09-11 PROCEDURE — 96365 THER/PROPH/DIAG IV INF INIT: CPT

## 2020-09-11 PROCEDURE — 83605 ASSAY OF LACTIC ACID: CPT | Mod: 91 | Performed by: PHYSICIAN ASSISTANT

## 2020-09-11 PROCEDURE — 80053 COMPREHEN METABOLIC PANEL: CPT | Performed by: PHYSICIAN ASSISTANT

## 2020-09-11 PROCEDURE — 00000146 ZZHCL STATISTIC GLUCOSE BY METER IP

## 2020-09-11 PROCEDURE — 25000131 ZZH RX MED GY IP 250 OP 636 PS 637: Performed by: PHYSICIAN ASSISTANT

## 2020-09-11 PROCEDURE — 25000128 H RX IP 250 OP 636: Performed by: PHYSICIAN ASSISTANT

## 2020-09-11 PROCEDURE — 81001 URINALYSIS AUTO W/SCOPE: CPT | Performed by: PHYSICIAN ASSISTANT

## 2020-09-11 PROCEDURE — U0003 INFECTIOUS AGENT DETECTION BY NUCLEIC ACID (DNA OR RNA); SEVERE ACUTE RESPIRATORY SYNDROME CORONAVIRUS 2 (SARS-COV-2) (CORONAVIRUS DISEASE [COVID-19]), AMPLIFIED PROBE TECHNIQUE, MAKING USE OF HIGH THROUGHPUT TECHNOLOGIES AS DESCRIBED BY CMS-2020-01-R: HCPCS | Performed by: PHYSICIAN ASSISTANT

## 2020-09-11 PROCEDURE — 71045 X-RAY EXAM CHEST 1 VIEW: CPT | Mod: TC

## 2020-09-11 PROCEDURE — 99285 EMERGENCY DEPT VISIT HI MDM: CPT | Mod: 25

## 2020-09-11 PROCEDURE — 93005 ELECTROCARDIOGRAM TRACING: CPT

## 2020-09-11 RX ORDER — METOPROLOL SUCCINATE 50 MG/1
50 TABLET, EXTENDED RELEASE ORAL DAILY
Status: ON HOLD | COMMUNITY
End: 2021-01-01

## 2020-09-11 RX ORDER — DEXTROSE MONOHYDRATE 25 G/50ML
25-50 INJECTION, SOLUTION INTRAVENOUS
Status: DISCONTINUED | OUTPATIENT
Start: 2020-09-11 | End: 2020-09-12 | Stop reason: HOSPADM

## 2020-09-11 RX ORDER — NICOTINE POLACRILEX 4 MG
15-30 LOZENGE BUCCAL
Status: DISCONTINUED | OUTPATIENT
Start: 2020-09-11 | End: 2020-09-12 | Stop reason: HOSPADM

## 2020-09-11 RX ORDER — CEFTRIAXONE SODIUM 2 G/50ML
2 INJECTION, SOLUTION INTRAVENOUS ONCE
Status: COMPLETED | OUTPATIENT
Start: 2020-09-11 | End: 2020-09-11

## 2020-09-11 RX ADMIN — SODIUM CHLORIDE 1000 ML: 9 INJECTION, SOLUTION INTRAVENOUS at 20:48

## 2020-09-11 RX ADMIN — CEFTRIAXONE SODIUM 2 G: 2 INJECTION, SOLUTION INTRAVENOUS at 22:02

## 2020-09-11 RX ADMIN — SODIUM CHLORIDE 1000 ML: 9 INJECTION, SOLUTION INTRAVENOUS at 20:19

## 2020-09-11 RX ADMIN — INSULIN HUMAN 10 UNITS: 100 INJECTION, SOLUTION PARENTERAL at 21:09

## 2020-09-11 ASSESSMENT — ENCOUNTER SYMPTOMS
SHORTNESS OF BREATH: 0
NECK STIFFNESS: 0
ACTIVITY CHANGE: 0
CHEST TIGHTNESS: 0
NECK PAIN: 0
HEADACHES: 0
BRUISES/BLEEDS EASILY: 0
DIFFICULTY URINATING: 1
ABDOMINAL PAIN: 0
VOMITING: 0
NAUSEA: 0
SPEECH DIFFICULTY: 0
DIZZINESS: 0
LIGHT-HEADEDNESS: 0
APPETITE CHANGE: 0
FATIGUE: 0
FEVER: 0
CHILLS: 0

## 2020-09-12 VITALS
DIASTOLIC BLOOD PRESSURE: 94 MMHG | SYSTOLIC BLOOD PRESSURE: 148 MMHG | OXYGEN SATURATION: 97 % | TEMPERATURE: 98.3 F | HEART RATE: 105 BPM | RESPIRATION RATE: 16 BRPM

## 2020-09-12 LAB
D DIMER PPP FEU-MCNC: 1.3 UG/ML FEU (ref 0–0.5)
SARS-COV-2 RNA SPEC QL NAA+PROBE: NORMAL
SPECIMEN SOURCE: NORMAL

## 2020-09-12 NOTE — ED PROVIDER NOTES
"  History     Chief Complaint   Patient presents with     Hyperglycemia     The history is provided by the patient.     Florentin Reyes is a 79 year old male who presented to the emergency department via EMS for evaluation of a fall.  The patient lives at a local assisted living and tells me that he tripped over his feet in the elevator and lowered himself to the ground.  Denies any loss consciousness.  Denies any head injury.  He did however have some urinary incontinence but he also wears in adult brief.  EMS on scene reported the patient refused transport but after vital signs they reported a possible fever of 100.1 as well as blood sugar of greater than 400.  The patient does have a history of diabetes.  Reports that he has had a widespread rash for \"a while\" and that the staff \"put salve on it.\"  He denies any chest pain, cough, shortness of breath, abdominal pain, or other concerns.    Allergies:  No Known Allergies    Problem List:    There are no active problems to display for this patient.       Past Medical History:    No past medical history on file.    Past Surgical History:    Past Surgical History:   Procedure Laterality Date     COLONOSCOPY N/A 5/31/2018    Procedure: COLONOSCOPY;  COLONOSCOPY WITH POLYPECTOMY;  Surgeon: Roldan Vila MD;  Location: [facility]     COLONOSCOPY - HIM SCAN  09/01/2012    Colonoscopy, [facility]-NL-5 yr 9/2012       Family History:    No family history on file.    Social History:  Marital Status:  Single [1]  Social History     Tobacco Use     Smoking status: Not on file   Substance Use Topics     Alcohol use: Not on file     Drug use: Not on file        Medications:    GLIPIZIDE PO  insulin glargine (LANTUS SOLOSTAR) 100 UNIT/ML pen  lisinopril-hydrochlorothiazide (PRINZIDE/ZESTORETIC) 20-12.5 MG per tablet  METFORMIN HCL PO  metoprolol succinate ER (TOPROL-XL) 50 MG 24 hr tablet  RisperiDONE (RISPERDAL PO)  SIMVASTATIN PO  tamsulosin (FLOMAX) 0.4 MG capsule          Review of " Systems   Constitutional: Negative for activity change, appetite change, chills, fatigue and fever.   Respiratory: Negative for chest tightness and shortness of breath.    Cardiovascular: Negative for chest pain.   Gastrointestinal: Negative for abdominal pain, nausea and vomiting.   Genitourinary: Positive for difficulty urinating.   Musculoskeletal: Negative for neck pain and neck stiffness.   Skin: Positive for rash.   Neurological: Negative for dizziness, syncope, speech difficulty, light-headedness and headaches.   Hematological: Does not bruise/bleed easily.       Physical Exam   BP: 155/95  Pulse: 112  Temp: 98.2  F (36.8  C)  Resp: 20  SpO2: 96 %      Physical Exam  Vitals signs and nursing note reviewed.   Constitutional:       General: He is not in acute distress.     Appearance: Normal appearance. He is obese. He is not ill-appearing, toxic-appearing or diaphoretic.      Comments: Pleasant and talkative 79-year-old male found semireclined on the exam bed in no distress.   HENT:      Head: Normocephalic and atraumatic.   Pulmonary:      Effort: Pulmonary effort is normal.   Abdominal:      Palpations: Abdomen is soft.      Tenderness: There is no abdominal tenderness.   Skin:     General: Skin is warm and dry.      Capillary Refill: Capillary refill takes less than 2 seconds.      Findings: Rash present.      Comments: Diffuse maculopapular rash on the torso, buttocks, upper arms, and legs with discrete excoriations.  There is no ulcers, vesicles, purpura, or other concerns.   Neurological:      General: No focal deficit present.      Mental Status: He is alert and oriented to person, place, and time.         ED Course        Procedures          EKG shows a sinus tachycardia at a rate of 107.  Normal KS interval.  Normal QRS duration.  Normal QTC.  There is a slight left axis deviation of -31 degrees.  No concerning ST segments.  No concerning T waves.    Questionable right-sided opacity on chest  x-ray.    Critical Care time:  none               No results found for this or any previous visit (from the past 24 hour(s)).    Medications   0.9% sodium chloride BOLUS (0 mLs Intravenous Stopped 9/11/20 2045)   0.9% sodium chloride BOLUS (0 mLs Intravenous Stopped 9/11/20 2202)   insulin regular injection 10 Units (10 Units Subcutaneous Given 9/11/20 2109)   cefTRIAXone IN D5W (ROCEPHIN) intermittent infusion 2 g (0 g Intravenous Stopped 9/11/20 2259)       Assessments & Plan (with Medical Decision Making)   Patient was signed out to Dr. Vera at routine change of shift.     This document was prepared using a combination of typing and voice generated software.  While every attempt was made for accuracy, spelling and grammatical errors may exist.    I have reviewed the nursing notes.    I have reviewed the findings, diagnosis, plan and need for follow up with the patient.       Discharge Medication List as of 9/12/2020  1:08 AM          Final diagnoses:   Fall, initial encounter   Hyperglycemia   Pneumonia   Rash and nonspecific skin eruption   Urinary incontinence       9/11/2020   [facility]     Kelli Wheeler PA-C  09/16/20 1042

## 2020-09-12 NOTE — ED TRIAGE NOTES
Pt brought in by [facility]. Pt fell in an elevator, denies any pain. States he just slid to the floor. Blood sugar for  and temp 100.1.

## 2020-09-13 LAB
LABORATORY COMMENT REPORT: NORMAL
SARS-COV-2 RNA SPEC QL NAA+PROBE: NEGATIVE
SPECIMEN SOURCE: NORMAL

## 2020-09-13 NOTE — ED PROVIDER NOTES
Due to tachycardia, Ddimer high, LOC?  Plan was CTA chest to rule out PE and reevaluation , pt understood but refused any further workup and insisted leaving AMA

## 2020-09-17 LAB
BACTERIA SPEC CULT: NORMAL
BACTERIA SPEC CULT: NORMAL
Lab: NORMAL
Lab: NORMAL
SPECIMEN SOURCE: NORMAL
SPECIMEN SOURCE: NORMAL

## 2020-09-30 NOTE — PROGRESS NOTES
"SUBJECTIVE:  Florentin Reyes, 80 year old, male presents with the following Chief Complaint(s) with HPI to follow:   Chief Complaint   Patient presents with     WOUND CARE     NEW open wound of buttocks          HPI:  Florentin present today for the assessment and treatment of an ulcer to the right buttock.  He arrives with a bordered foam dressing over the wound.  He is unsure how or when the wound started. He tells me the staff have been treating it with \"a salve and band aid\".     I called and talked with the staff at [facility], where he resides.  The RN reports the wound was reported the first half of September 2020.  She tells me the skin was intact and white when it was reported and within the next four days it opened up and started draining.      Of note, he has a diffuse maculopapular rash on the buttocks, torso, bilateral thighs, and arms.  He denies itching at this time.  [facility] staff report he has seen his primary provider, Dr. Fuller for this complaint and he ordered Bactrim as well as Cerave.    No past medical history on file.    Past Surgical History:   Procedure Laterality Date     COLONOSCOPY N/A 5/31/2018    Procedure: COLONOSCOPY;  COLONOSCOPY WITH POLYPECTOMY;  Surgeon: Roldan Vila MD;  Location: [facility] OR     COLONOSCOPY - HIM SCAN  09/01/2012    Colonoscopy, [facility]-NL-[facility] yr 9/2012       No family history on file.    Social History     Tobacco Use     Smoking status: Never Smoker     Smokeless tobacco: Never Used   Substance Use Topics     Alcohol use: Not on file       Current Outpatient Medications   Medication Sig Dispense Refill     acetaminophen (TYLENOL) 325 MG tablet Take 325 mg by mouth every 4 hours as needed for mild pain       GLIPIZIDE PO Take 5 mg by mouth every morning (before breakfast)        insulin glargine (LANTUS SOLOSTAR) 100 UNIT/ML pen Inject 40 Units Subcutaneous every morning       latanoprost (XALATAN) 0.005 % ophthalmic solution Place 1 drop into both eyes daily At " bedtime       lisinopril-hydrochlorothiazide (PRINZIDE/ZESTORETIC) 20-12.5 MG per tablet Take 1 tablet by mouth daily       METFORMIN HCL PO Take 1,000 mg by mouth 2 times daily (with meals)       metoprolol succinate ER (TOPROL-XL) 50 MG 24 hr tablet Take 50 mg by mouth daily       mupirocin (BACTROBAN) 2 % external ointment Apply topically 2 times daily       nystatin-triamcinolone (MYCOLOG) 297246-6.1 UNIT/GM-% external ointment Apply topically 2 times daily prn       RisperiDONE (RISPERDAL PO) Take 0.5 mg by mouth 2 times daily       SIMVASTATIN PO Take 40 mg by mouth At Bedtime       tamsulosin (FLOMAX) 0.4 MG capsule Take 0.4 mg by mouth daily       timolol maleate (TIMOPTIC) 0.25 % ophthalmic solution Place 1 drop into both eyes 2 times daily         No Known Allergies    REVIEW OF SYSTEMS  Skin: as above  Eyes: positive for reading glasses  Ears/Nose/Throat: negative  Respiratory: No shortness of breath, dyspnea on exertion, cough, or hemoptysis  Cardiovascular: negative  Gastrointestinal: negative  Genitourinary: positive for incontinence  Musculoskeletal: positive for ambulates with walker  Neurologic: negative  Psychiatric: negative  Hematologic/Lymphatic/Immunologic: negative  Endocrine: positive for diabetes    OBJECTIVE:    B/P: 116/78, T: 98.4, P: 106, R: Data Unavailable, W: 260 lbs 0 oz, BMI: Body mass index is 32.5 kg/m .  Constitutional: alert, no distress, cooperative and over weight  Head: Normocephalic. No masses, lesions, tenderness or abnormalities  Cardiovascular: RRR. No murmurs, clicks gallops or rub  Respiratory:  Good diaphragmatic excursion. Lungs clear  Gastrointestinal: Abdomen soft, non-tender. BS normal. No masses, organomegaly  : Deferred  Musculoskeletal: extremities normal- no gross deformities noted and normal muscle tone  Skin: Diffuse maculopapular rash to torso, buttocks, upper thighs, and arms       Wound description:     Type of Wound:  Pressure ulcer stage 3   Location:   "Right buttock    Drainage amount:  small   Drainage color:  serosanguinous   Odor:  none   Wound bed:  White slough   Surrounding skin:  intact        Measurements:  0.9 x 1.5 x 4.8 cm   Pain:  denies       Dressing change:      Wound cleansed with mild soap, rinse, dried.  Sharp debridement performed with Adson's forceps and #15 scalpel to remove non-viable tissue (<20 sq cm) to the level of the subcutaneous tissue. Patient was informed of the risks and benefits and consented to the procedure.  Two identifiers were used and a time out was completed.  Packed wound with 1/4\" plain packing strip coated with honey gel, covered with plain foam, secured with medipore tape.    Neurologic: Sensation grossly WNL.  Psychiatric: affect normal/bright    THERAPY GOAL:    Heal to closure    ASSESSMENT / PLAN:    Comments:     - He does not appear to be a good historian, therefore I called and spoke with the RN at [facility] where he resides.  She was able to provide the missing wound and rash information.  Reviewed dressing change instructions also.   - Monitor rash    Plan:     - Pack wound with honey gel coated 1/4\" plain packing tape, cover with plain foam, secure with medipore tape.   - Prevent pressure to the right buttock by only being up to the chair for meals (TID, no longer than 1 hour each time; shift weight every 15\")   - Increase protein intake   - Start multivitamin (order sent to [facility])      Follow-up in 1 week or as needed for acute concerns.    Lorraine Silvestre CNS  Surgery and Wound Care  [place]  "

## 2020-09-30 NOTE — PATIENT INSTRUCTIONS
"Wound Care Instructions right buttock:  1) Wash your hands and work space  2) Gather all your supplies: clean wash cloths, mild soap (baby soap), honey gel, 1/4\" plain packing tape, plain foam, medipore tape or bordered foam  3) Cleanse wound with mild soap, rinse, pat dry  4) Pack wound with honey gel coated 1/4' plain packing tape, cover with plain foam, secure with medipore tape (you can use the bordered foam, if you have it on hand)   5) Change dressing Monday, Wednesday, and Friday  6) Dispose of all dirty supplies  7) Wash hands and equipment    I have sent enough supplies for the dressing changes this week.    Please report any increase in pain, fevers, chills, changes in the drainage odor.   Please call [phone] if you have any questions or concerns or if any problems develop.     Follow up October 7, 2020 at 9 a.m. in wound care    "

## 2020-09-30 NOTE — NURSING NOTE
"Chief Complaint   Patient presents with     WOUND CARE     NEW open wound of buttocks       Initial /78 (BP Location: Left arm, Patient Position: Sitting, Cuff Size: Adult Regular)   Pulse 106   Temp 98.4  F (36.9  C) (Tympanic)   Ht 1.905 m (6' 3\")   Wt 117.9 kg (260 lb)   SpO2 95%   BMI 32.50 kg/m   Estimated body mass index is 32.5 kg/m  as calculated from the following:    Height as of this encounter: 1.905 m (6' 3\").    Weight as of this encounter: 117.9 kg (260 lb).  Medication Reconciliation: complete  Taisha Gilbert MA    "

## 2020-10-07 NOTE — NURSING NOTE
"Chief Complaint   Patient presents with     WOUND CARE       Initial /82 (BP Location: Left arm, Patient Position: Sitting, Cuff Size: Adult Regular)   Pulse 102   Temp 98.2  F (36.8  C) (Tympanic)   Resp 20   SpO2 96%  Estimated body mass index is 32.5 kg/m  as calculated from the following:    Height as of 9/30/20: 1.905 m (6' 3\").    Weight as of 9/30/20: 117.9 kg (260 lb).  Medication Reconciliation: rhett Cottrell  "

## 2020-10-07 NOTE — PROGRESS NOTES
SUBJECTIVE:  Florentin Reyes, 80 year old, male presents with the following Chief Complaint(s) with HPI to follow:   Chief Complaint   Patient presents with     WOUND CARE          HPI:  Florentin presents today for the re-assessment and treatment of an ulcer to the right buttock.  He arrives with the dressing intact.      Past history:  He is a poor historian and couldn't tell me anything about the wound origin, therefore, I called and talked with the staff at [facility], where he resides.  The RN reports the wound was reported the first half of September 2020.  She tells me the skin was intact and white when it was reported and within the next four days it opened up and started draining.      Of note, he has a diffuse maculopapular rash on the buttocks, torso, bilateral thighs, and arms.  He denies itching at this time.  [facility] staff report he has seen his primary provider, Dr. Fuller for this complaint and he ordered Bactrim as well as Cerave.  **Update - the rash is more diffuse today and pronounced today.    History reviewed. No pertinent past medical history.    Past Surgical History:   Procedure Laterality Date     COLONOSCOPY N/A 5/31/2018    Procedure: COLONOSCOPY;  COLONOSCOPY WITH POLYPECTOMY;  Surgeon: Roldan Vila MD;  Location: [facility]     COLONOSCOPY - HIM SCAN  09/01/2012    Colonoscopy, [facility]-NL-5 yr 9/2012       History reviewed. No pertinent family history.    Social History     Tobacco Use     Smoking status: Never Smoker     Smokeless tobacco: Never Used   Substance Use Topics     Alcohol use: Not on file       Current Outpatient Medications   Medication Sig Dispense Refill     acetaminophen (TYLENOL) 325 MG tablet Take 325 mg by mouth every 4 hours as needed for mild pain       GLIPIZIDE PO Take 5 mg by mouth every morning (before breakfast)        insulin glargine (LANTUS SOLOSTAR) 100 UNIT/ML pen Inject 40 Units Subcutaneous every morning       latanoprost (XALATAN) 0.005 % ophthalmic  solution Place 1 drop into both eyes daily At bedtime       lisinopril-hydrochlorothiazide (PRINZIDE/ZESTORETIC) 20-12.5 MG per tablet Take 1 tablet by mouth daily       METFORMIN HCL PO Take 1,000 mg by mouth 2 times daily (with meals)       metoprolol succinate ER (TOPROL-XL) 50 MG 24 hr tablet Take 50 mg by mouth daily       multivitamin (CENTRUM SILVER) tablet Take 1 tablet by mouth daily 90 tablet 3     mupirocin (BACTROBAN) 2 % external ointment Apply topically 2 times daily       nystatin-triamcinolone (MYCOLOG) 555512-8.1 UNIT/GM-% external ointment Apply topically 2 times daily prn       RisperiDONE (RISPERDAL PO) Take 0.5 mg by mouth 2 times daily       SIMVASTATIN PO Take 40 mg by mouth At Bedtime       tamsulosin (FLOMAX) 0.4 MG capsule Take 0.4 mg by mouth daily       timolol maleate (TIMOPTIC) 0.25 % ophthalmic solution Place 1 drop into both eyes 2 times daily         No Known Allergies    REVIEW OF SYSTEMS  Skin: as above  Eyes: positive for reading glasses  Ears/Nose/Throat: negative  Respiratory: No shortness of breath, dyspnea on exertion, cough, or hemoptysis  Cardiovascular: negative  Gastrointestinal: negative  Genitourinary: positive for incontinence  Musculoskeletal: positive for ambulates with walker  Neurologic: negative  Psychiatric: negative  Hematologic/Lymphatic/Immunologic: negative  Endocrine: positive for diabetes    OBJECTIVE:    B/P: 116/78, T: 98.4, P: 106, R: Data Unavailable, W: 0 lbs 0 oz, BMI: There is no height or weight on file to calculate BMI.  Constitutional: alert, no distress, cooperative and over weight  Head: Normocephalic. No masses, lesions, tenderness or abnormalities  Cardiovascular: RRR. No murmurs, clicks gallops or rub  Respiratory:  Good diaphragmatic excursion. Lungs clear  Gastrointestinal: Abdomen soft, non-tender. BS normal. No masses, organomegaly  : Deferred  Musculoskeletal: extremities normal- no gross deformities noted and normal muscle tone  Skin:  "Diffuse maculopapular rash to torso, buttocks, upper thighs, and arms       Wound description:     Type of Wound:  Pressure ulcer stage 3   Location:  Right buttock    Drainage amount:  small   Drainage color:  serosanguinous   Odor:  none   Wound bed:  White slough   Surrounding skin:  intact        Measurements:  0.9 x 1.5 x 4 cm   Pain:  denies       Dressing change:      Wound cleansed with mild soap, rinse, dried.  Packed wound with 1/4\" plain packing strip coated with honey gel, covered with plain foam, secured with medipore tape.    Neurologic: Sensation grossly WNL.  Psychiatric: affect normal/bright    THERAPY GOAL:    Heal to closure    ASSESSMENT / PLAN:    Comments:     - The wound depth is measuring smaller today, therefore we will continue with the same dressing   - The rash is looking worse today, therefore I will start him on Tagamet today and will have him start triamcinolone cream on the abdomen to see if it improves, if so, we will treat all rash areas with it.     Plan:     - Pack wound with honey gel coated 1/4\" plain packing tape, cover with plain foam, secure with medipore tape.   - Prevent pressure to the right buttock by only being up to the chair for meals (TID, no longer than 1 hour each time; shift weight every 15\")   - Increase protein intake   - Continue taking multivitamin   - Tagamet 400 mg BID   - Triamcinolone cream 0.1% BID to rash on abdomen    **Florentin resides at [facility].  The phone number is [phone].  Report called to EDWARD Olsen.    Follow-up in 1 week or as needed for acute concerns.    Lorraine Silvestre CNS  Surgery and Wound Care  [place]  "

## 2020-10-15 NOTE — ED PROVIDER NOTES
"  History     Chief Complaint   Patient presents with     Rash     HPI  Florentin Reyes is a 80 year old male who resides at a nursing facility. He was being evaluated in wound clinic for a buttock wound which was packed per chart review, when the wound nurse noticed a significant rash so advised him to come to the urgent care.  He is a poor historian, and no staff available.  He is unclear of onset of rash, but thinks it has been many weeks \"they are just putting lotion on it\".  At times he reports that itching.  Denies pain, fevers, or illness.     Allergies:  No Known Allergies    Problem List:    There are no active problems to display for this patient.       Past Medical History:    HTN  DM    Past Surgical History:    Past Surgical History:   Procedure Laterality Date     COLONOSCOPY N/A 5/31/2018    Procedure: COLONOSCOPY;  COLONOSCOPY WITH POLYPECTOMY;  Surgeon: Roldan Vila MD;  Location: [facility]     COLONOSCOPY - HIM SCAN  09/01/2012    Colonoscopy, [facility]-NL-5 yr 9/2012       Family History:    No family history on file.    Social History:  Marital Status:  Single [1]  Social History     Tobacco Use     Smoking status: Never Smoker     Smokeless tobacco: Never Used   Substance Use Topics     Alcohol use: Not on file     Drug use: Not on file        Medications:         fluconazole (DIFLUCAN) 150 MG tablet       acetaminophen (TYLENOL) 325 MG tablet       cimetidine (TAGAMET) 400 MG tablet       GLIPIZIDE PO       insulin glargine (LANTUS SOLOSTAR) 100 UNIT/ML pen       latanoprost (XALATAN) 0.005 % ophthalmic solution       lisinopril-hydrochlorothiazide (PRINZIDE/ZESTORETIC) 20-12.5 MG per tablet       METFORMIN HCL PO       metoprolol succinate ER (TOPROL-XL) 50 MG 24 hr tablet       multivitamin (CENTRUM SILVER) tablet       mupirocin (BACTROBAN) 2 % external ointment       nystatin-triamcinolone (MYCOLOG) 772211-9.1 UNIT/GM-% external ointment       RisperiDONE (RISPERDAL PO)       SIMVASTATIN " PO       tamsulosin (FLOMAX) 0.4 MG capsule       timolol maleate (TIMOPTIC) 0.25 % ophthalmic solution       triamcinolone (KENALOG) 0.1 % external cream          Review of Systems   Constitutional: Negative for chills, fatigue and fever.   Skin: Positive for rash. Negative for wound.       Physical Exam   Pulse: 96  Temp: 96.9  F (36.1  C)  Resp: 18  SpO2: 98 %      Physical Exam  Constitutional:       Appearance: He is not ill-appearing or toxic-appearing.      Comments: Smells of urine   Skin:     Comments: Extensive scaly erythematous raised rash entire thorax (anterior and posterior), abdomen, upper bilateral arms, bilateral legs.     Rash spares the head/face and palms    Neurological:      Mental Status: He is alert.      Comments: Poor historian, but clear speech   Psychiatric:         Mood and Affect: Mood normal.         ED Course         Results for orders placed or performed during the hospital encounter of 10/15/20 (from the past 24 hour(s))   KOH skin hair or nails    Specimen: Back; Skin    Back   Result Value Ref [facility]    Specimen Description Skin Back     KOH Skin Hair Nails Test No fungal elements seen    KOH skin hair or nails    Specimen: Skin    Abdominal   Result Value Ref [place]    Specimen Description Skin Abdominal     KOH Skin Hair Nails Test No fungal elements seen    KOH skin hair or nails    Specimen: Skin    Left Leg   Result Value Ref [facility]    Specimen Description Skin Left Leg     KOH Skin Hair Nails Test Few  Yeast   (A)        Medications - No data to display    Assessments & Plan (with Medical Decision Making)   Florentin Reyes presents to the Urgent Care with diagnosis of rash, positive KOH or john    -Rash is on extensive area of his body including anterior posterior thorax, abdomen, bilateral upper arms, and bilateral legs.  The rash does spare his face, head. and palms.  -Positive lab for his legs but negative for back and abdomen  -Question patient's hygiene as he does  smell of urine  -Due to extent of rash and possible Candida, will attempt to treat systemically with fluconazole once weekly x 4 weeks as would be a lot of area to cover with topicals  -Due to patient's age did not want to systemically start any steroids at this time  -rash does not appear to be a drug reaction    -Pt's facility was contacted by nursing staff for transport and to  his anti-fungal medication  -Follow-Up: PCP x 1 week, may need further work-up if anti-fungal not working        I have reviewed the nursing notes.    I have reviewed the findings, diagnosis, plan and need for follow up with the patient.      Discharge Medication List as of 10/15/2020  6:35 PM      START taking these medications    Details   fluconazole (DIFLUCAN) 150 MG tablet Take 1 tablet (150 mg) by mouth once a week, Disp-4 tablet, R-0, E-Prescribe             Final diagnoses:   Candidiasis of skin       10/15/2020   [facility]     Rocky Petersen, BOB  10/15/20 1932

## 2020-10-15 NOTE — ED AVS SNAPSHOT
[facility]  [address]  [place] [address]  Phone: [phone]                                    Florentin Reyes   MRN: [id number]    Department: [facility]   Date of Visit: 10/15/2020           After Visit Summary Signature Page    I have received my discharge instructions, and my questions have been answered. I have discussed any challenges I see with this plan with the nurse or doctor.    ..........................................................................................................................................  Patient/Patient Representative Signature      ..........................................................................................................................................  Patient Representative Print Name and Relationship to Patient    ..................................................               ................................................  Date                                   Time    ..........................................................................................................................................  Reviewed by Signature/Title    ...................................................              ..............................................  Date                                               Time          22EPIC Rev 08/18

## 2020-10-15 NOTE — NURSING NOTE
"Chief Complaint   Patient presents with     WOUND CARE     buttocks, rash       Initial BP (!) 154/92 (BP Location: Left arm, Patient Position: Sitting, Cuff Size: Adult Regular)   Pulse 97   Temp 98  F (36.7  C) (Tympanic)   Ht 1.905 m (6' 3\")   Wt 117.9 kg (260 lb)   SpO2 96%   BMI 32.50 kg/m   Estimated body mass index is 32.5 kg/m  as calculated from the following:    Height as of this encounter: 1.905 m (6' 3\").    Weight as of this encounter: 117.9 kg (260 lb).  Medication Reconciliation: complete  Taisha Gilbert MA    "

## 2020-10-15 NOTE — ED TRIAGE NOTES
Patient brought in by wound care stating that he did have a rash, but it's now all over his body.

## 2020-10-15 NOTE — PROGRESS NOTES
CLINIC NOTE - FOLLOW UP  10/15/2020    Patient:Florentin Reyes    Reason for Visit: Follow up of a right buttock ulcer    This is a 80 year old male here for follow up from a right buttock ulcer.  Please see Piper Silvestre NP's previous dictation on the patient from 10/7/2020.  The patient is a resident at [facility] and states he is without complaint regarding the wound and is tolerating current wound care.     He is noted to have a rash throughout his body which it appears he has had for approximately a month.  Nursing staff reports the rash is worsening since when he was seen last week in wound care.     Current Medications:  Current Outpatient Medications   Medication Sig Dispense Refill     acetaminophen (TYLENOL) 325 MG tablet Take 325 mg by mouth every 4 hours as needed for mild pain       cimetidine (TAGAMET) 400 MG tablet Take 1 tablet (400 mg) by mouth 2 times daily 60 tablet 0     GLIPIZIDE PO Take 5 mg by mouth every morning (before breakfast)        insulin glargine (LANTUS SOLOSTAR) 100 UNIT/ML pen Inject 40 Units Subcutaneous every morning       latanoprost (XALATAN) 0.005 % ophthalmic solution Place 1 drop into both eyes daily At bedtime       lisinopril-hydrochlorothiazide (PRINZIDE/ZESTORETIC) 20-12.5 MG per tablet Take 1 tablet by mouth daily       METFORMIN HCL PO Take 1,000 mg by mouth 2 times daily (with meals)       metoprolol succinate ER (TOPROL-XL) 50 MG 24 hr tablet Take 50 mg by mouth daily       multivitamin (CENTRUM SILVER) tablet Take 1 tablet by mouth daily 90 tablet 3     mupirocin (BACTROBAN) 2 % external ointment Apply topically 2 times daily       nystatin-triamcinolone (MYCOLOG) 798704-5.1 UNIT/GM-% external ointment Apply topically 2 times daily prn       RisperiDONE (RISPERDAL PO) Take 0.5 mg by mouth 2 times daily       SIMVASTATIN PO Take 40 mg by mouth At Bedtime       tamsulosin (FLOMAX) 0.4 MG capsule Take 0.4 mg by mouth daily       timolol maleate (TIMOPTIC) 0.25 %  "ophthalmic solution Place 1 drop into both eyes 2 times daily       triamcinolone (KENALOG) 0.1 % external cream Apply topically 2 times daily 85.2 g 0       Allergies:  No Known Allergies    PHYSICAL EXAM:     Vital signs: BP (!) 154/92 (BP Location: Left arm, Patient Position: Sitting, Cuff Size: Adult Regular)   Pulse 97   Temp 98  F (36.7  C) (Tympanic)   Ht 1.905 m (6' 3\")   Wt 117.9 kg (260 lb)   SpO2 96%   BMI 32.50 kg/m     BMI: Body mass index is 32.5 kg/m .   General: in no acute distress, alert   Skin: rash noted throughout body   Lungs: respirations are non-labored   Abdominal: non-distended; rash noted   Extremities: Rash noted    Right buttock:    Wound description:                Type of Wound:  Pressure ulcer stage 3              Location:  Right buttock               Drainage amount:   none              Drainage color:  N/A              Odor:  none              Wound bed:  granular              Surrounding skin:  intact              Measurements:  1 x 1 x 3 cm              Pain:  denies       Dressing change:                 Wound cleansed with mild soap, rinse, dried.  Packed wound with 1/4\" plain packing strip coated with honey gel, covered with gauze, secured with medipore tape.    Neurological: alert    ASSESSMENT:  80 year old male here as follow up from a right buttock wound, rash    PLAN: Continue current wound care.  Patient to be seen in urgent care after this appointment for his rash has it has been present for approximately 1 month and is worsening.     FOLLOW UP: in 1 week    "

## 2020-10-15 NOTE — ED TRIAGE NOTES
"Pt presents today with c/o Rash to \"all over body\" pt unsure when it started or where it came from.   "

## 2020-10-21 NOTE — TELEPHONE ENCOUNTER
Discussed with Deloris Jackson. She is seeing him for a wound issue and does not treat rash. She would advise that the patient see his PCP or be evaluated in ER for worsening full body rash. Returned call to [facility] at [facility] and advised that patient will need to be seen by PCP or go to ER for this issue. She indicates understanding.   
Please call EDWARD Olsen at [facility], at [phone]. She has a question regarding patients rash which is getting worse. Patient was seen by Deloris Jackson on 10/15/20 and also he is scheduled to see her on 10/22/20 for a wound and rash.   
DISPLAY PLAN FREE TEXT

## 2020-10-26 NOTE — NURSING NOTE
Chief Complaint   Patient presents with     WOUND CARE     Buttock wound       Initial /82 (BP Location: Left arm, Patient Position: Sitting, Cuff Size: Adult Regular)   Pulse 106   Temp 96.9  F (36.1  C) (Tympanic)   Ht 1.829 m (6')   Wt 117.9 kg (260 lb)   SpO2 100%   BMI 35.26 kg/m   Estimated body mass index is 35.26 kg/m  as calculated from the following:    Height as of this encounter: 1.829 m (6').    Weight as of this encounter: 117.9 kg (260 lb).  Medication Reconciliation: complete  Taisha Gilbert MA

## 2020-10-26 NOTE — PROGRESS NOTES
"SUBJECTIVE:  Flornetin Reyes, 80 year old, male presents with the following Chief Complaint(s) with HPI to follow:  Chief Complaint   Patient presents with     WOUND CARE     Buttock wound        Wound Care    HPI:  Florentin is here today with the facility's RN for the reassessment and treatment of buttock wound.  Back story according to MAIDA Ferrari note:  Started at the end of September.   Florentin lives at [facility].   RN reports his skin was intact and white, then 4 days prior to his appointment it started to open up and drain.   Past tx: \"salve and a bandaid\"  9/30/2020: saw MAIDA Ferrari. Tx: surgical debridement, honey gel on 1/4\" plain packing strip. Noted rash  10/7/2020: saw MAIDA Ferrari. Tx: honey gel on 1/4\" plain packing.  Rash noted--tagamet 400 mg BID, triamcinolone cream 0.1% BID  10/15/2020: saw Deloris GIVENS NP. Tx: honey gel on 1/4\" plain packing.  Sent to the ED for his rash.  Tx of rash: KOH was done, oral fluconazole started for 4 weeks  10/26/2020: seeing myself today  No issues with dressing changes   Denies any fevers, chills, and/or malodorous drainage.   Patient does smell of urine--hx of incontinence.   RN reports 2 new skin concerns on the same buttocks.    PMH: denies hx of smoking, denies hx of diabetes, poor historian   Last A1c  No results found for: A1C    Florentin is also here regarding his rash  RN believes it started as a small rash on his right upper chest back in July, 2020.   It eventually spread, to his shoulder, back, abdominal area, arms and legs.    Past tx: cerave, trial of bactrim,   Current tx: triamcinolone cream  Recently started on oral fluconazole.   RN feels the rash on his stomach is slightly better.    No upcoming dermatology appointment  Florentin denies that the rash is itchy.   States it doesn't bother him.        There is no problem list on file for this patient.      History reviewed. No pertinent past medical history.    Past Surgical History:   Procedure Laterality " Date     COLONOSCOPY N/A 5/31/2018    Procedure: COLONOSCOPY;  COLONOSCOPY WITH POLYPECTOMY;  Surgeon: Roldan Vila MD;  Location: [facility]     COLONOSCOPY - HIM SCAN  09/01/2012    Colonoscopy, [facility]-NL-5 yr 9/2012       History reviewed. No pertinent family history.    Social History     Tobacco Use     Smoking status: Never Smoker     Smokeless tobacco: Never Used   Substance Use Topics     Alcohol use: Not on file       Current Outpatient Medications   Medication Sig Dispense Refill     acetaminophen (TYLENOL) 325 MG tablet Take 325 mg by mouth every 4 hours as needed for mild pain       cimetidine (TAGAMET) 400 MG tablet Take 1 tablet (400 mg) by mouth 2 times daily 60 tablet 0     doxycycline hyclate (VIBRAMYCIN) 100 MG capsule Take 1 capsule (100 mg) by mouth 2 times daily 14 capsule 0     fluconazole (DIFLUCAN) 150 MG tablet Take 1 tablet (150 mg) by mouth once a week 4 tablet 0     GLIPIZIDE PO Take 5 mg by mouth every morning (before breakfast)        insulin glargine (LANTUS SOLOSTAR) 100 UNIT/ML pen Inject 40 Units Subcutaneous every morning       latanoprost (XALATAN) 0.005 % ophthalmic solution Place 1 drop into both eyes daily At bedtime       lisinopril-hydrochlorothiazide (PRINZIDE/ZESTORETIC) 20-12.5 MG per tablet Take 1 tablet by mouth daily       METFORMIN HCL PO Take 1,000 mg by mouth 2 times daily (with meals)       metoprolol succinate ER (TOPROL-XL) 50 MG 24 hr tablet Take 50 mg by mouth daily       multivitamin (CENTRUM SILVER) tablet Take 1 tablet by mouth daily 90 tablet 3     mupirocin (BACTROBAN) 2 % external ointment Apply topically 2 times daily       nystatin-triamcinolone (MYCOLOG) 995076-4.1 UNIT/GM-% external ointment Apply topically 2 times daily prn       RisperiDONE (RISPERDAL PO) Take 0.5 mg by mouth 2 times daily       SIMVASTATIN PO Take 40 mg by mouth At Bedtime       tamsulosin (FLOMAX) 0.4 MG capsule Take 0.4 mg by mouth daily       timolol maleate (TIMOPTIC) 0.25 %  ophthalmic solution Place 1 drop into both eyes 2 times daily       triamcinolone (KENALOG) 0.1 % external cream Apply topically 2 times daily 85.2 g 0       No Known Allergies    REVIEW OF SYSTEMS  Skin: as above  Eyes: positive for reading glasses  Ears/Nose/Throat: negative  Respiratory: No shortness of breath, dyspnea on exertion, cough, or hemoptysis  Cardiovascular: negative  Gastrointestinal: negative  Genitourinary: positive for incontinence  Musculoskeletal: positive for ambulates with walker  Neurologic: negative  Psychiatric: negative  Hematologic/Lymphatic/Immunologic: negative  Endocrine: positive for diabetes    OBJECTIVE:  /82 (BP Location: Left arm, Patient Position: Sitting, Cuff Size: Adult Regular)   Pulse 106   Temp 96.9  F (36.1  C) (Tympanic)   Ht 1.829 m (6')   Wt 117.9 kg (260 lb)   SpO2 100%   BMI 35.26 kg/m    Constitutional: alert and no distress  Respiratory:  Good diaphragmatic excursion.   Musculoskeletal: extremities normal- no gross deformities noted  Skin:   Wound description: Type of Wound- post-surgical, abscess; Location- inferior right buttocks, Drainage amount-scant, Drainage color-honey color, Odor- none; Wound bed-see picture, Surrounding skin-epiboled wound edges, no erythema, no lymphangitis, no increase heat.  Measurements: 0.6 x 0.5 x 0. 3 cm  Dressing change: Wound cleansed with soap and water, debrided, dressed with iodoform and gauze.     Noted 2 suspicious areas on the right buttocks.   Mid-right buttocks--area of fluctuance.  When area was expressed, noted purulent bloody drainage. Post-surgical: depth: 1.5 cm    Superior right buttocks--area is firm/indurated (3x3 cm), unable to express anything    Body rash: please see pictures    Psychiatric: mentation appears normal for baseline    Sharp debridement:   Noted fluctuant area of the buttocks that is draining  Wound cleansed with mild soap, rinse, dried.  Prepped area with chlorhexidine.    Patient was  "informed of the risks and benefits and consented to the procedure.  Two identifiers were used and a time out was completed.  Inject 3 ml of 2% lidocaine without epi to both surrounding tissues  Sharp debridement performed with #15 scalpel to remove non-viable tissue (<20 sq cm) to the level of the subcutaneous tissue.   Removed the epiboled edges from his old wound with a sharp curette.   Notes small amount of bleeding.  Pressure applied, 2 dots of silver nitrate used.    Packed wound with 1/4\" iodoform packing strip, gauze, secured with medipore tape.      LABS  Results for orders placed or performed in visit on 10/26/20   Wound Culture Aerobic Bacterial     Status: Abnormal (Preliminary result)    Specimen: Right Buttock   Result Value Ref Range    Specimen Description Right Buttock     Culture Micro (A)      Heavy growth  Staphylococcus aureus  Susceptibility testing in progress     Gram stain     Status: Abnormal    Specimen: Right Buttock   Result Value Ref Range    Specimen Description Right Buttock     Gram Stain Moderate  PMNs seen       Gram Stain (A)      Moderate  Intracellular  Gram positive cocci  in pairs and clusters         ASSESSMENT / PLAN:  (S31.306S) Open wound of right buttock, sequela  (primary encounter diagnosis)  Comment: noted  Plan: Wound Culture Aerobic Bacterial, Gram stain,         doxycycline hyclate (VIBRAMYCIN) 100 MG capsule          Epiboled edges cleaned from old wound  Opened new area.   Will watch the other area (superior right buttocks)  Started on Doxcycline.     After today, okay to go back to honey gel infused 1/4\" plain packing strip, dry gauze and tape    (R21) Rash and nonspecific skin eruption  Comment: noted  Plan: DERMATOLOGY ADULT REFERRAL          Spoke with Dr. Pitts, who stated to place him on his schedule.    R/o pityriasis rubra pilaris.   Keep using the triamcinolone as directed    Follow up as scheduled    Patient Instructions   Wash hands prior to dressing " "change.   Clean instruments as appropriate    Discontinue previous wound care instructions    Right buttocks:   Wash wound with mild soap and water, dry  A.  Wound with depth  Apply honey gel to 1/4\" plain packing strip  Gently pack to wound base   Cover with dry gauze  Secure with tape  Change daily and as needed  B. Wounds without depth  Apply honey to wound  Cover with dry gauze  Secure with tape  Change daily and as needed    Please call if any questions/concerns and/or problems ([phone])    Follow up   One week      Watch for worsening symptoms of infections  Take antibiotic as prescribed  Don't take antibiotic and multivitamin together.           Time: 45 minutes  Barrier: none  Willingness to learn: accepting    Soledad LLANOS FNP-BC  Diabetes and Wound Care    Cc: Dr. Fuller          "

## 2020-10-26 NOTE — PATIENT INSTRUCTIONS
"Wash hands prior to dressing change.   Clean instruments as appropriate    Discontinue previous wound care instructions    Right buttocks:   Wash wound with mild soap and water, dry  A.  Wound with depth  Apply honey gel to 1/4\" plain packing strip  Gently pack to wound base   Cover with dry gauze  Secure with tape  Change daily and as needed  B. Wounds without depth  Apply honey to wound  Cover with dry gauze  Secure with tape  Change daily and as needed    Please call if any questions/concerns and/or problems ([phone])    Follow up   One week      Watch for worsening symptoms of infections  Take antibiotic as prescribed  Don't take antibiotic and multivitamin together.       "

## 2020-11-02 NOTE — PROGRESS NOTES
Visit Date:   11/02/2020      SUBJECTIVE:  Florentin comes in with a nurse helper from his nursing home.  In August, he began to develop a rash on the chest.  This has persisted and extended to many parts of his body as a body.  A female   apparently was diagnosed as having scabies some time around this time.  He was given topical steroids and was also started on Diflucan once a week. The rash continues.   His nurse accompanying him today said that it may have been an issue with Staphylococcus at 1 time as well, the methicillin A resistant type.  He has had some issues with mentation, but is cooperative and pleasant .      OBJECTIVE:  An otherwise healthy gentleman in no distress.  His rash is very diffuse on the lower chest and on the legs and manifested by small tiny red papules, a little bit of scaling, no significant evidence of scratching.   It does not affect his face to any extent, it is mostly truncal rash and lower extremity rash.  Interestingly, his chest and back show large areas of sparing and there are islands of sparing in other areas.      ASSESSMENT:  Differential would be a drug rash, pityriasis rubra pilaris or simply eczema with secondary infection.  Note that I do not see any pustules or anything to suggest active infection today.     PLAN:  Shave biopsy was taken from a cluster of lesions on his right abdominal wall submitted for evaluation.  I recommended 0.1 triamcinolone ointment to be applied twice daily for the next several weeks until I can return and reevaluate the situation.  I advised his nurse that it may take a week to 2 weeks to get any information from his biopsy, but hopefully will be helpful.  He is on numerous medications and while this might be a drug rash, it is a bit more follicular and papular than I would expect and does not affect his face, as mentioned.      MEDICATIONS AND ALLERGIES:  Reviewed.      Return in a month and also pending the biopsy report from the  abdominal shave biopsy.         H ALONDRA MAZARIEGOS MD             D: 2020   T: 2020   MT: CHEKO      Name:     ISHAAN DESOUZA   MRN:      [id number]        Account:      [id number]   :      1940           Visit Date:   2020      Document: [id number]       cc: Soledad Osorio NP

## 2020-11-02 NOTE — PATIENT INSTRUCTIONS
Wash hands prior to dressing change.   Clean instruments as appropriate    Discontinue previous wound care instructions    Right buttocks:   Wash wound with mild soap and water, dry  Apply mupirocin to wounds  Cover with dry gauze  Secure with tape  Change daily and as needed    Please call if any questions/concerns and/or problems ([phone])    Follow up   2 weeks    Antibiotic sent to pharmacy  Don't take antibiotic and multivitamin together.     Watch for worsening symptoms of infections

## 2020-11-02 NOTE — NURSING NOTE
Chief Complaint   Patient presents with     WOUND CARE     buttock wound       Initial /64 (BP Location: Left arm, Patient Position: Sitting, Cuff Size: Adult Regular)   Pulse 90   Temp 97  F (36.1  C) (Tympanic)   Ht 1.829 m (6')   Wt 117.9 kg (260 lb)   SpO2 98%   BMI 35.26 kg/m   Estimated body mass index is 35.26 kg/m  as calculated from the following:    Height as of this encounter: 1.829 m (6').    Weight as of this encounter: 117.9 kg (260 lb).  Medication Reconciliation: complete  Taisha Gilbert MA

## 2020-11-02 NOTE — LETTER
11/2/2020       RE: Florentin Reyes  [address]  [address]  [facility] [address]     Dear Colleague,    Thank you for referring your patient, Florentin Reyes, to the [facility]. Please see a copy of my visit note below.    Visit Date:   11/02/2020      SUBJECTIVE:  Florentin comes in with a nurse helper from his nursing home.  In August, he began to develop a rash on the chest.  This has persisted and extended to many parts of his body as a body.  A female   apparently was diagnosed as having scabies some time around this time.  He was given topical steroids and was also started on Diflucan once a week. The rash continues.   His nurse accompanying him today said that it may have been an issue with Staphylococcus at 1 time as well, the methicillin A resistant type.  He has had some issues with mentation, but is cooperative and pleasant .      OBJECTIVE:  An otherwise healthy gentleman in no distress.  His rash is very diffuse on the lower chest and on the legs and manifested by small tiny red papules, a little bit of scaling, no significant evidence of scratching.   It does not affect his face to any extent, it is mostly truncal rash and lower extremity rash.  Interestingly, his chest and back show large areas of sparing and there are islands of sparing in other areas.      ASSESSMENT:  Differential would be a drug rash, pityriasis rubra pilaris or simply eczema with secondary infection.  Note that I do not see any pustules or anything to suggest active infection today.     PLAN:  Shave biopsy was taken from a cluster of lesions on his right abdominal wall submitted for evaluation.  I recommended 0.1 triamcinolone ointment to be applied twice daily for the next several weeks until I can return and reevaluate the situation.  I advised his nurse that it may take a week to 2 weeks to get any information from his biopsy, but hopefully will be  helpful.  He is on numerous medications and while this might be a drug rash, it is a bit more follicular and papular than I would expect and does not affect his face, as mentioned.      MEDICATIONS AND ALLERGIES:  Reviewed.      Return in a month and also pending the biopsy report from the abdominal shave biopsy.         CHAVA MAZARIEGOS MD             D: 2020   T: 2020   MT: PK      Name:     ISHAAN DESOUZA   MRN:      [id number]        Account:      [id number]   :      1940           Visit Date:   2020      Document: [id number]       cc: Soledad Osorio NP         Again, thank you for allowing me to participate in the care of your patient.      Sincerely,    CHAVA Mazariegos MD

## 2020-11-02 NOTE — PROGRESS NOTES
"SUBJECTIVE:  Florentin Reyes, 80 year old, male presents with the following Chief Complaint(s) with HPI to follow:  Chief Complaint   Patient presents with     WOUND CARE     buttock wound        Wound Care    HPI:  Florentin is here today with the facility's RN for the reassessment and treatment of buttock wound.  Back story according to MAIDA Ferrari note:  Started at the end of September.   Florentin lives at [facility].   RN reports his skin was intact and white, then 4 days prior to his appointment it started to open up and drain.   Past tx: \"salve and a bandaid\"  9/30/2020: saw MAIDA Ferrari. Tx: surgical debridement, honey gel on 1/4\" plain packing strip. Noted rash  10/7/2020: saw MAIDA Ferrari. Tx: honey gel on 1/4\" plain packing.  Rash noted--tagamet 400 mg BID, triamcinolone cream 0.1% BID  10/15/2020: saw Deloris GIVENS NP. Tx: honey gel on 1/4\" plain packing.  Sent to the ED for his rash.  Tx of rash: KOH was done, oral fluconazole started for 4 weeks  10/26/2020: saw myself. Tx: sharp debridement; honey gel  No issues with dressing changes   Never received the abx that were ordered  11/2/2020: seeing myself today  Denies any fevers, chills, and/or malodorous drainage.   Patient does smell of urine--hx of incontinence.   RN reports the areas are looking better.   No change in the top buttocks area   PMH: denies hx of smoking, denies hx of diabetes, poor historian   Last A1c  No results found for: A1C    Florentin is seeing Dr. Pitts today for his rash      There is no problem list on file for this patient.      History reviewed. No pertinent past medical history.    Past Surgical History:   Procedure Laterality Date     COLONOSCOPY N/A 5/31/2018    Procedure: COLONOSCOPY;  COLONOSCOPY WITH POLYPECTOMY;  Surgeon: Roldan Vila MD;  Location: [facility]     COLONOSCOPY - HIM SCAN  09/01/2012    Colonoscopy, [facility]-NL-5 yr 9/2012       History reviewed. No pertinent family history.    Social History     Tobacco Use     " Smoking status: Never Smoker     Smokeless tobacco: Never Used   Substance Use Topics     Alcohol use: Not on file       Current Outpatient Medications   Medication Sig Dispense Refill     acetaminophen (TYLENOL) 325 MG tablet Take 325 mg by mouth every 4 hours as needed for mild pain       AFLURIA QUADRIVALENT injection SHOT       Alcohol Swabs (EASY TOUCH ALCOHOL PREP MEDIUM) 70 % PADS        Assure Francisco Lancets MISC        BD AUTOSHIELD DUO 30G X 5 MM        blood glucose (NO BRAND SPECIFIED) test strip        cimetidine (TAGAMET) 400 MG tablet Take 1 tablet (400 mg) by mouth 2 times daily 60 tablet 0     fluconazole (DIFLUCAN) 150 MG tablet Take 1 tablet (150 mg) by mouth once a week 4 tablet 0     GLIPIZIDE PO Take 5 mg by mouth every morning (before breakfast)        GLUCOCARD VITAL TEST test strip        insulin glargine (LANTUS SOLOSTAR) 100 UNIT/ML pen Inject 40 Units Subcutaneous every morning       latanoprost (XALATAN) 0.005 % ophthalmic solution Place 1 drop into both eyes daily At bedtime       lisinopril (ZESTRIL) 20 MG tablet        lisinopril-hydrochlorothiazide (PRINZIDE/ZESTORETIC) 20-12.5 MG per tablet Take 1 tablet by mouth daily       loratadine (CLARITIN) 10 MG tablet        metFORMIN (GLUCOPHAGE-XR) 500 MG 24 hr tablet        METFORMIN HCL PO Take 1,000 mg by mouth 2 times daily (with meals)       metoprolol succinate ER (TOPROL-XL) 50 MG 24 hr tablet Take 50 mg by mouth daily       multivitamin (CENTRUM SILVER) tablet Take 1 tablet by mouth daily 90 tablet 3     mupirocin (BACTROBAN) 2 % external ointment Apply topically 2 times daily       Neomycin-Bacitracin-Polymyxin (TRIPLE ANTIBIOTIC) 3.5-400-5000 OINT ointment        NOVOLOG FLEXPEN 100 UNIT/ML soln        nystatin-triamcinolone (MYCOLOG) 857186-3.1 UNIT/GM-% external ointment Apply topically 2 times daily prn       Pseudoephedrine-APAP  MG TABS        RisperiDONE (RISPERDAL PO) Take 0.5 mg by mouth 2 times daily        simvastatin (ZOCOR) 40 MG tablet        SIMVASTATIN PO Take 40 mg by mouth At Bedtime       sulfamethoxazole-trimethoprim (BACTRIM DS) 800-160 MG tablet Take 1 tablet by mouth 2 times daily 14 tablet 0     tamsulosin (FLOMAX) 0.4 MG capsule Take 0.4 mg by mouth daily       timolol maleate (TIMOPTIC) 0.25 % ophthalmic solution Place 1 drop into both eyes 2 times daily       timolol maleate (TIMOPTIC) 0.5 % ophthalmic solution        triamcinolone (KENALOG) 0.1 % external cream Apply topically 2 times daily 85.2 g 0     ULTICARE 29G X 12.7MM insulin pen needle        triamcinolone (KENALOG) 0.1 % external ointment Apply bid to rash 454 g 3       No Known Allergies    REVIEW OF SYSTEMS  Skin: as above  Eyes: positive for reading glasses  Ears/Nose/Throat: negative  Respiratory: No shortness of breath, dyspnea on exertion, cough, or hemoptysis  Cardiovascular: negative  Gastrointestinal: negative  Genitourinary: positive for incontinence  Musculoskeletal: positive for ambulates with walker  Neurologic: negative  Psychiatric: negative  Hematologic/Lymphatic/Immunologic: negative  Endocrine: positive for diabetes    OBJECTIVE:  /64 (BP Location: Left arm, Patient Position: Sitting, Cuff Size: Adult Regular)   Pulse 90   Temp 97  F (36.1  C) (Tympanic)   Ht 1.829 m (6')   Wt 117.9 kg (260 lb)   SpO2 98%   BMI 35.26 kg/m    Constitutional: alert and no distress  Respiratory:  Good diaphragmatic excursion.   Musculoskeletal: extremities normal- no gross deformities noted  Skin:   Wound description: Type of Wound- post-surgical, abscess; Location- right buttocks, Drainage amount-scant, Drainage color-old blood, Odor- none; Wound bed-100% pink, Surrounding skin-no erythema, no lymphangitis, no increase heat.  Measurements:   Centrally 2 wounds: 0.5 x 0.5; 0.4 x 0.6 cm  Medial wound: 1 x 0.5 x 1 cm  Dressing change: Wound cleansed with soap and water, mupirocin, dry gauze and tape.     Superior right buttocks--area  is firm/indurated (3x3 cm), unable to express anything    Body rash: no change    Psychiatric: mentation appears normal for baseline    LABS  No results found for any visits on 11/02/20.    ASSESSMENT / PLAN:  (S31.819D) Open wound of right buttock, subsequent encounter  (primary encounter diagnosis)  Comment: noted and improving  Plan: sulfamethoxazole-trimethoprim (BACTRIM DS)         800-160 MG tablet    Abx ordered to address the firm/indurated area    Changed to mupirocin to all 3 wounds, cover with dry gauze and tape    Follow up as scheduled    Patient Instructions   Wash hands prior to dressing change.   Clean instruments as appropriate    Discontinue previous wound care instructions    Right buttocks:   Wash wound with mild soap and water, dry  Apply mupirocin to wounds  Cover with dry gauze  Secure with tape  Change daily and as needed    Please call if any questions/concerns and/or problems ([phone])    Follow up   2 weeks    Antibiotic sent to pharmacy  Don't take antibiotic and multivitamin together.     Watch for worsening symptoms of infections          Time: 35 minutes  Barrier: none  Willingness to learn: accepting    Soledad LLANOS FNP-BC  Diabetes and Wound Care    Cc: Dr. Fuller

## 2020-11-02 NOTE — NURSING NOTE
Chief Complaint   Patient presents with     Consult     Rash       Initial /62   Pulse 93   Temp 97.9  F (36.6  C) (Tympanic)   Resp 16   SpO2 98%  Estimated body mass index is 35.26 kg/m  as calculated from the following:    Height as of an earlier encounter on 11/2/20: 1.829 m (6').    Weight as of an earlier encounter on 11/2/20: 117.9 kg (260 lb).  Medication Reconciliation: complete  Lyssa Golden LPN

## 2020-11-16 NOTE — NURSING NOTE
Chief Complaint   Patient presents with     WOUND CARE       Initial /76   Pulse 91   Temp 96.7  F (35.9  C) (Tympanic)   Wt 117.9 kg (260 lb)   SpO2 96%   BMI 35.26 kg/m   Estimated body mass index is 35.26 kg/m  as calculated from the following:    Height as of 11/2/20: 1.829 m (6').    Weight as of this encounter: 117.9 kg (260 lb).  Medication Reconciliation: complete  Lesli Romero LPN

## 2020-11-16 NOTE — PROGRESS NOTES
"SUBJECTIVE:  Florentin Reyes, 80 year old, male presents with the following Chief Complaint(s) with HPI to follow:  Chief Complaint   Patient presents with     WOUND CARE        Wound Care    HPI:  Florentin is here today with the facility's RN for the reassessment and treatment of buttock wound.  Back story according to MAIDA Ferrari note:  Started at the end of September.   Florentin lives at [facility].   RN reports his skin was intact and white, then 4 days prior to his appointment it started to open up and drain.   Past tx: \"salve and a bandaid\"  9/30/2020: saw MAIDA Ferrari. Tx: surgical debridement, honey gel on 1/4\" plain packing strip. Noted rash  10/7/2020: saw MAIDA Ferrari. Tx: honey gel on 1/4\" plain packing.  Rash noted--tagamet 400 mg BID, triamcinolone cream 0.1% BID  10/15/2020: saw Deloris GIVENS NP. Tx: honey gel on 1/4\" plain packing.  Sent to the ED for his rash.  Tx of rash: KOH was done, oral fluconazole started for 4 weeks  10/26/2020: saw myself. Tx: sharp debridement; honey gel.  11/2/2020: saw myself.  Tx: mupirocin, dry gauze. Never received abx so new one was ordered  11/2/2020: saw Dr. Pitts--see note  11/16/2020: seeing myself today.    No issues with dressing changes.    Denies any fevers, chills, and/or malodorous drainage.   Patient does smell of urine--hx of incontinence.   RN reports the areas are looking better and the rash is improving  PMH: denies hx of smoking, denies hx of diabetes, poor historian   Last A1c  No results found for: A1C    There is no problem list on file for this patient.      History reviewed. No pertinent past medical history.    Past Surgical History:   Procedure Laterality Date     COLONOSCOPY N/A 5/31/2018    Procedure: COLONOSCOPY;  COLONOSCOPY WITH POLYPECTOMY;  Surgeon: Roldan Vila MD;  Location: [facility]     COLONOSCOPY - HIM SCAN  09/01/2012    Colonoscopy, [facility]-NL-5 yr 9/2012       History reviewed. No pertinent family history.    Social History "     Tobacco Use     Smoking status: Never Smoker     Smokeless tobacco: Never Used   Substance Use Topics     Alcohol use: Not on file       Current Outpatient Medications   Medication Sig Dispense Refill     acetaminophen (TYLENOL) 325 MG tablet Take 325 mg by mouth every 4 hours as needed for mild pain       AFLURIA QUADRIVALENT injection SHOT       Alcohol Swabs (EASY TOUCH ALCOHOL PREP MEDIUM) 70 % PADS        Assure Francisco Lancets MISC        BD AUTOSHIELD DUO 30G X 5 MM        blood glucose (NO BRAND SPECIFIED) test strip        GLIPIZIDE PO Take 5 mg by mouth every morning (before breakfast)        GLUCOCARD VITAL TEST test strip        insulin glargine (LANTUS SOLOSTAR) 100 UNIT/ML pen Inject 40 Units Subcutaneous every morning       latanoprost (XALATAN) 0.005 % ophthalmic solution Place 1 drop into both eyes daily At bedtime       lisinopril (ZESTRIL) 20 MG tablet        lisinopril-hydrochlorothiazide (PRINZIDE/ZESTORETIC) 20-12.5 MG per tablet Take 1 tablet by mouth daily       loratadine (CLARITIN) 10 MG tablet        metFORMIN (GLUCOPHAGE-XR) 500 MG 24 hr tablet        METFORMIN HCL PO Take 1,000 mg by mouth 2 times daily (with meals)       metoprolol succinate ER (TOPROL-XL) 50 MG 24 hr tablet Take 50 mg by mouth daily       multivitamin (CENTRUM SILVER) tablet Take 1 tablet by mouth daily 90 tablet 3     mupirocin (BACTROBAN) 2 % external ointment Apply topically 2 times daily 30 g 1     Neomycin-Bacitracin-Polymyxin (TRIPLE ANTIBIOTIC) 3.5-400-5000 OINT ointment        NOVOLOG FLEXPEN 100 UNIT/ML soln        nystatin-triamcinolone (MYCOLOG) 629036-7.1 UNIT/GM-% external ointment Apply topically 2 times daily prn       Pseudoephedrine-APAP  MG TABS        RisperiDONE (RISPERDAL PO) Take 0.5 mg by mouth 2 times daily       simvastatin (ZOCOR) 40 MG tablet        SIMVASTATIN PO Take 40 mg by mouth At Bedtime       tamsulosin (FLOMAX) 0.4 MG capsule Take 0.4 mg by mouth daily       timolol maleate  (TIMOPTIC) 0.25 % ophthalmic solution Place 1 drop into both eyes 2 times daily       timolol maleate (TIMOPTIC) 0.5 % ophthalmic solution        triamcinolone (KENALOG) 0.1 % external cream Apply topically 2 times daily 85.2 g 0     triamcinolone (KENALOG) 0.1 % external ointment Apply bid to rash 454 g 3     ULTICARE 29G X 12.7MM insulin pen needle          No Known Allergies    REVIEW OF SYSTEMS  Skin: as above  Eyes: positive for reading glasses  Ears/Nose/Throat: negative  Respiratory: No shortness of breath, dyspnea on exertion, cough, or hemoptysis  Cardiovascular: negative  Gastrointestinal: negative  Genitourinary: positive for incontinence  Musculoskeletal: positive for ambulates with walker  Neurologic: negative  Psychiatric: negative  Hematologic/Lymphatic/Immunologic: negative  Endocrine: positive for diabetes    OBJECTIVE:  /76   Pulse 91   Temp 96.7  F (35.9  C) (Tympanic)   Wt 117.9 kg (260 lb)   SpO2 96%   BMI 35.26 kg/m    Constitutional: alert and no distress  Respiratory:  Good diaphragmatic excursion.   Musculoskeletal: extremities normal- no gross deformities noted  Skin:   Wound description: Type of Wound- post-surgical, abscess; Location- right buttocks, Drainage amount-none, Drainage color-n/a Odor- none; Wound bed-100% healed; Surrounding skin-no erythema, no lymphangitis, no increase heat.  Measurements: healed  Dressing change: Wound cleansed with soap and water, left open to air     Superior right buttocks--area is improving, small amount of induration    Body rash: resolving    Psychiatric: mentation appears normal for baseline    LABS  No results found for any visits on 11/16/20.    ASSESSMENT / PLAN:  (H39.726D) Open wound of right buttock, subsequent encounter  (primary encounter diagnosis)  Comment: noted and healed  Plan: mupirocin (BACTROBAN) 2 % external ointment          Follow up as needed    (P87.281S) Open wound of right buttock, sequela  Comment: noted and  healed  Plan:     Follow up as needed    (R21) Rash and nonspecific skin eruption  Comment: resolving  Plan:   Keep with Dr. Pitts plan    Treatment goal:  Keep it healed    Patient Instructions   Wounds are healed.      Follow up as needed.            Time: 25 minutes  Barrier: none  Willingness to learn: accepting    Soledad LLANOS [facility]-BC  Diabetes and Wound Care    Cc: Dr. Fuller

## 2021-01-01 ENCOUNTER — OFFICE VISIT (OUTPATIENT)
Dept: WOUND CARE | Facility: OTHER | Age: 81
End: 2021-01-01
Attending: NURSE PRACTITIONER
Payer: COMMERCIAL

## 2021-01-01 ENCOUNTER — DOCUMENTATION ONLY (OUTPATIENT)
Dept: OTHER | Facility: CLINIC | Age: 81
End: 2021-01-01

## 2021-01-01 ENCOUNTER — DOCUMENTATION ONLY (OUTPATIENT)
Dept: FAMILY MEDICINE | Facility: OTHER | Age: 81
End: 2021-01-01

## 2021-01-01 ENCOUNTER — APPOINTMENT (OUTPATIENT)
Dept: GENERAL RADIOLOGY | Facility: HOSPITAL | Age: 81
DRG: 291 | End: 2021-01-01
Attending: EMERGENCY MEDICINE
Payer: COMMERCIAL

## 2021-01-01 ENCOUNTER — TELEPHONE (OUTPATIENT)
Dept: FAMILY MEDICINE | Facility: OTHER | Age: 81
End: 2021-01-01

## 2021-01-01 ENCOUNTER — APPOINTMENT (OUTPATIENT)
Dept: GENERAL RADIOLOGY | Facility: HOSPITAL | Age: 81
End: 2021-01-01
Attending: EMERGENCY MEDICINE
Payer: COMMERCIAL

## 2021-01-01 ENCOUNTER — APPOINTMENT (OUTPATIENT)
Dept: OCCUPATIONAL THERAPY | Facility: HOSPITAL | Age: 81
DRG: 308 | End: 2021-01-01
Attending: INTERNAL MEDICINE
Payer: COMMERCIAL

## 2021-01-01 ENCOUNTER — TRANSFERRED RECORDS (OUTPATIENT)
Dept: HEALTH INFORMATION MANAGEMENT | Facility: CLINIC | Age: 81
End: 2021-01-01

## 2021-01-01 ENCOUNTER — HOSPITAL ENCOUNTER (INPATIENT)
Dept: WOUND CARE | Facility: HOSPITAL | Age: 81
DRG: 291 | End: 2021-08-30
Attending: NURSE PRACTITIONER
Payer: COMMERCIAL

## 2021-01-01 ENCOUNTER — HOSPITAL ENCOUNTER (INPATIENT)
Dept: CARDIOLOGY | Facility: HOSPITAL | Age: 81
DRG: 308 | End: 2021-03-23
Attending: INTERNAL MEDICINE
Payer: COMMERCIAL

## 2021-01-01 ENCOUNTER — APPOINTMENT (OUTPATIENT)
Dept: PHYSICAL THERAPY | Facility: HOSPITAL | Age: 81
DRG: 308 | End: 2021-01-01
Attending: INTERNAL MEDICINE
Payer: COMMERCIAL

## 2021-01-01 ENCOUNTER — HOSPITAL ENCOUNTER (INPATIENT)
Facility: HOSPITAL | Age: 81
LOS: 4 days | Discharge: NURSING, REHAB OR LONG-TERM CARE | DRG: 291 | End: 2021-09-02
Attending: EMERGENCY MEDICINE | Admitting: INTERNAL MEDICINE
Payer: COMMERCIAL

## 2021-01-01 ENCOUNTER — HOSPITAL ENCOUNTER (INPATIENT)
Facility: HOSPITAL | Age: 81
LOS: 2 days | Discharge: NURSING, REHAB OR LONG-TERM CARE | DRG: 308 | End: 2021-03-24
Attending: PHYSICIAN ASSISTANT | Admitting: INTERNAL MEDICINE
Payer: COMMERCIAL

## 2021-01-01 ENCOUNTER — APPOINTMENT (OUTPATIENT)
Dept: CT IMAGING | Facility: HOSPITAL | Age: 81
DRG: 308 | End: 2021-01-01
Attending: PHYSICIAN ASSISTANT
Payer: COMMERCIAL

## 2021-01-01 ENCOUNTER — APPOINTMENT (OUTPATIENT)
Dept: GENERAL RADIOLOGY | Facility: HOSPITAL | Age: 81
DRG: 308 | End: 2021-01-01
Attending: PHYSICIAN ASSISTANT
Payer: COMMERCIAL

## 2021-01-01 ENCOUNTER — HOSPITAL ENCOUNTER (EMERGENCY)
Facility: HOSPITAL | Age: 81
Discharge: HOME | End: 2021-07-31
Attending: EMERGENCY MEDICINE | Admitting: EMERGENCY MEDICINE
Payer: COMMERCIAL

## 2021-01-01 VITALS
TEMPERATURE: 96.5 F | HEART RATE: 84 BPM | BODY MASS INDEX: 35.16 KG/M2 | DIASTOLIC BLOOD PRESSURE: 90 MMHG | WEIGHT: 259.26 LBS | OXYGEN SATURATION: 99 % | RESPIRATION RATE: 22 BRPM | SYSTOLIC BLOOD PRESSURE: 136 MMHG

## 2021-01-01 VITALS
HEART RATE: 105 BPM | OXYGEN SATURATION: 96 % | SYSTOLIC BLOOD PRESSURE: 107 MMHG | DIASTOLIC BLOOD PRESSURE: 71 MMHG | RESPIRATION RATE: 22 BRPM | TEMPERATURE: 96 F

## 2021-01-01 VITALS
OXYGEN SATURATION: 98 % | WEIGHT: 259 LBS | SYSTOLIC BLOOD PRESSURE: 132 MMHG | BODY MASS INDEX: 35.13 KG/M2 | HEART RATE: 124 BPM | TEMPERATURE: 97 F | DIASTOLIC BLOOD PRESSURE: 88 MMHG

## 2021-01-01 VITALS
OXYGEN SATURATION: 92 % | HEART RATE: 96 BPM | TEMPERATURE: 97.4 F | SYSTOLIC BLOOD PRESSURE: 120 MMHG | DIASTOLIC BLOOD PRESSURE: 91 MMHG

## 2021-01-01 VITALS
SYSTOLIC BLOOD PRESSURE: 130 MMHG | DIASTOLIC BLOOD PRESSURE: 91 MMHG | HEART RATE: 139 BPM | TEMPERATURE: 98.2 F | OXYGEN SATURATION: 95 %

## 2021-01-01 VITALS
HEART RATE: 85 BPM | SYSTOLIC BLOOD PRESSURE: 89 MMHG | RESPIRATION RATE: 14 BRPM | DIASTOLIC BLOOD PRESSURE: 54 MMHG | WEIGHT: 249.56 LBS | OXYGEN SATURATION: 93 % | BODY MASS INDEX: 33.85 KG/M2 | TEMPERATURE: 96.5 F

## 2021-01-01 VITALS
OXYGEN SATURATION: 100 % | SYSTOLIC BLOOD PRESSURE: 132 MMHG | TEMPERATURE: 96.4 F | HEART RATE: 70 BPM | DIASTOLIC BLOOD PRESSURE: 80 MMHG

## 2021-01-01 DIAGNOSIS — I48.91 ATRIAL FIBRILLATION, UNSPECIFIED TYPE (H): Primary | ICD-10-CM

## 2021-01-01 DIAGNOSIS — L89.310 PRESSURE INJURY OF RIGHT BUTTOCK, UNSTAGEABLE (H): Primary | ICD-10-CM

## 2021-01-01 DIAGNOSIS — R06.2 WHEEZING: ICD-10-CM

## 2021-01-01 DIAGNOSIS — S41.101D OPEN ARM WOUND, RIGHT, SUBSEQUENT ENCOUNTER: ICD-10-CM

## 2021-01-01 DIAGNOSIS — I50.9 CONGESTIVE HEART FAILURE, UNSPECIFIED HF CHRONICITY, UNSPECIFIED HEART FAILURE TYPE (H): ICD-10-CM

## 2021-01-01 DIAGNOSIS — I50.20 SYSTOLIC CONGESTIVE HEART FAILURE, UNSPECIFIED HF CHRONICITY (H): ICD-10-CM

## 2021-01-01 DIAGNOSIS — Z79.4 TYPE 2 DIABETES MELLITUS WITHOUT COMPLICATION, WITH LONG-TERM CURRENT USE OF INSULIN (H): ICD-10-CM

## 2021-01-01 DIAGNOSIS — E11.9 TYPE 2 DIABETES MELLITUS WITHOUT COMPLICATION, WITH LONG-TERM CURRENT USE OF INSULIN (H): ICD-10-CM

## 2021-01-01 DIAGNOSIS — S41.101D OPEN ARM WOUND, RIGHT, SUBSEQUENT ENCOUNTER: Primary | ICD-10-CM

## 2021-01-01 DIAGNOSIS — R41.0 CONFUSION: ICD-10-CM

## 2021-01-01 DIAGNOSIS — Z51.5 HOSPICE CARE PATIENT: Primary | ICD-10-CM

## 2021-01-01 DIAGNOSIS — R06.82 TACHYPNEA: ICD-10-CM

## 2021-01-01 DIAGNOSIS — E16.2 HYPOGLYCEMIA: ICD-10-CM

## 2021-01-01 DIAGNOSIS — T14.8XXA OPEN WOUND: Primary | ICD-10-CM

## 2021-01-01 LAB
ALBUMIN SERPL-MCNC: 2.8 G/DL (ref 3.4–5)
ALBUMIN SERPL-MCNC: 3.1 G/DL (ref 3.4–5)
ALBUMIN SERPL-MCNC: 3.5 G/DL (ref 3.4–5)
ALBUMIN UR-MCNC: 10 MG/DL
ALBUMIN UR-MCNC: 30 MG/DL
ALP SERPL-CCNC: 104 U/L (ref 40–150)
ALP SERPL-CCNC: 108 U/L (ref 40–150)
ALP SERPL-CCNC: 82 U/L (ref 40–150)
ALT SERPL W P-5'-P-CCNC: 34 U/L (ref 0–70)
ALT SERPL W P-5'-P-CCNC: 46 U/L (ref 0–70)
ALT SERPL W P-5'-P-CCNC: 62 U/L (ref 0–70)
ANION GAP SERPL CALCULATED.3IONS-SCNC: 3 MMOL/L (ref 3–14)
ANION GAP SERPL CALCULATED.3IONS-SCNC: 4 MMOL/L (ref 3–14)
ANION GAP SERPL CALCULATED.3IONS-SCNC: 4 MMOL/L (ref 3–14)
ANION GAP SERPL CALCULATED.3IONS-SCNC: 7 MMOL/L (ref 3–14)
APPEARANCE UR: CLEAR
APPEARANCE UR: CLEAR
AST SERPL W P-5'-P-CCNC: 25 U/L (ref 0–45)
AST SERPL W P-5'-P-CCNC: 37 U/L (ref 0–45)
AST SERPL W P-5'-P-CCNC: 39 U/L (ref 0–45)
BACTERIA #/AREA URNS HPF: ABNORMAL /HPF
BACTERIA BLD CULT: ABNORMAL
BACTERIA BLD CULT: NO GROWTH
BASE EXCESS BLDV CALC-SCNC: 0.3 MMOL/L (ref -7.7–1.9)
BASOPHILS # BLD AUTO: 0 10E3/UL (ref 0–0.2)
BASOPHILS # BLD AUTO: 0 10E3/UL (ref 0–0.2)
BASOPHILS # BLD AUTO: 0 10E9/L (ref 0–0.2)
BASOPHILS NFR BLD AUTO: 0 %
BASOPHILS NFR BLD AUTO: 0.4 %
BASOPHILS NFR BLD AUTO: 1 %
BILIRUB SERPL-MCNC: 0.4 MG/DL (ref 0.2–1.3)
BILIRUB SERPL-MCNC: 0.5 MG/DL (ref 0.2–1.3)
BILIRUB SERPL-MCNC: 0.7 MG/DL (ref 0.2–1.3)
BILIRUB UR QL STRIP: NEGATIVE
BILIRUB UR QL STRIP: NEGATIVE
BUN SERPL-MCNC: 19 MG/DL (ref 7–30)
BUN SERPL-MCNC: 23 MG/DL (ref 7–30)
BUN SERPL-MCNC: 25 MG/DL (ref 7–30)
BUN SERPL-MCNC: 26 MG/DL (ref 7–30)
BUN SERPL-MCNC: 59 MG/DL (ref 7–30)
BUN SERPL-MCNC: 59 MG/DL (ref 7–30)
CALCIUM SERPL-MCNC: 8.6 MG/DL (ref 8.5–10.1)
CALCIUM SERPL-MCNC: 9.1 MG/DL (ref 8.5–10.1)
CALCIUM SERPL-MCNC: 9.5 MG/DL (ref 8.5–10.1)
CALCIUM SERPL-MCNC: 9.6 MG/DL (ref 8.5–10.1)
CALCIUM SERPL-MCNC: 9.9 MG/DL (ref 8.5–10.1)
CALCIUM SERPL-MCNC: 9.9 MG/DL (ref 8.5–10.1)
CHLORIDE BLD-SCNC: 112 MMOL/L (ref 94–109)
CHLORIDE BLD-SCNC: 116 MMOL/L (ref 94–109)
CHLORIDE BLD-SCNC: 116 MMOL/L (ref 94–109)
CHLORIDE SERPL-SCNC: 111 MMOL/L (ref 94–109)
CHLORIDE SERPL-SCNC: 111 MMOL/L (ref 94–109)
CHLORIDE SERPL-SCNC: 113 MMOL/L (ref 94–109)
CO2 SERPL-SCNC: 22 MMOL/L (ref 20–32)
CO2 SERPL-SCNC: 23 MMOL/L (ref 20–32)
CO2 SERPL-SCNC: 24 MMOL/L (ref 20–32)
CO2 SERPL-SCNC: 25 MMOL/L (ref 20–32)
CO2 SERPL-SCNC: 29 MMOL/L (ref 20–32)
CO2 SERPL-SCNC: 29 MMOL/L (ref 20–32)
COLOR UR AUTO: YELLOW
COLOR UR AUTO: YELLOW
CREAT SERPL-MCNC: 0.86 MG/DL (ref 0.66–1.25)
CREAT SERPL-MCNC: 1.13 MG/DL (ref 0.66–1.25)
CREAT SERPL-MCNC: 1.18 MG/DL (ref 0.66–1.25)
CREAT SERPL-MCNC: 1.18 MG/DL (ref 0.66–1.25)
CREAT SERPL-MCNC: 2.63 MG/DL (ref 0.66–1.25)
CREAT SERPL-MCNC: 3.08 MG/DL (ref 0.66–1.25)
DIFFERENTIAL METHOD BLD: NORMAL
EOSINOPHIL # BLD AUTO: 0 10E3/UL (ref 0–0.7)
EOSINOPHIL # BLD AUTO: 0.3 10E3/UL (ref 0–0.7)
EOSINOPHIL # BLD AUTO: 0.4 10E9/L (ref 0–0.7)
EOSINOPHIL NFR BLD AUTO: 0 %
EOSINOPHIL NFR BLD AUTO: 5 %
EOSINOPHIL NFR BLD AUTO: 5.4 %
ERYTHROCYTE [DISTWIDTH] IN BLOOD BY AUTOMATED COUNT: 13.7 % (ref 10–15)
ERYTHROCYTE [DISTWIDTH] IN BLOOD BY AUTOMATED COUNT: 13.8 % (ref 10–15)
ERYTHROCYTE [DISTWIDTH] IN BLOOD BY AUTOMATED COUNT: 13.9 % (ref 10–15)
ERYTHROCYTE [DISTWIDTH] IN BLOOD BY AUTOMATED COUNT: 18.6 % (ref 10–15)
ERYTHROCYTE [DISTWIDTH] IN BLOOD BY AUTOMATED COUNT: 19.4 % (ref 10–15)
ERYTHROCYTE [DISTWIDTH] IN BLOOD BY AUTOMATED COUNT: 19.7 % (ref 10–15)
EST. AVERAGE GLUCOSE BLD GHB EST-MCNC: 123 MG/DL
EST. AVERAGE GLUCOSE BLD GHB EST-MCNC: 140 MG/DL
GFR SERPL CREATININE-BSD FRML MDRD: 18 ML/MIN/1.73M2
GFR SERPL CREATININE-BSD FRML MDRD: 22 ML/MIN/1.73M2
GFR SERPL CREATININE-BSD FRML MDRD: 59 ML/MIN/{1.73_M2}
GFR SERPL CREATININE-BSD FRML MDRD: 59 ML/MIN/{1.73_M2}
GFR SERPL CREATININE-BSD FRML MDRD: 62 ML/MIN/{1.73_M2}
GFR SERPL CREATININE-BSD FRML MDRD: 83 ML/MIN/1.73M2
GLUCOSE BLD-MCNC: 121 MG/DL (ref 70–99)
GLUCOSE BLD-MCNC: 162 MG/DL (ref 70–99)
GLUCOSE BLD-MCNC: 66 MG/DL (ref 70–99)
GLUCOSE BLDC GLUCOMTR-MCNC: 111 MG/DL (ref 70–99)
GLUCOSE BLDC GLUCOMTR-MCNC: 120 MG/DL (ref 70–99)
GLUCOSE BLDC GLUCOMTR-MCNC: 127 MG/DL (ref 70–99)
GLUCOSE BLDC GLUCOMTR-MCNC: 142 MG/DL (ref 70–99)
GLUCOSE BLDC GLUCOMTR-MCNC: 164 MG/DL (ref 70–99)
GLUCOSE BLDC GLUCOMTR-MCNC: 174 MG/DL (ref 70–99)
GLUCOSE BLDC GLUCOMTR-MCNC: 50 MG/DL (ref 70–99)
GLUCOSE BLDC GLUCOMTR-MCNC: 56 MG/DL (ref 70–99)
GLUCOSE BLDC GLUCOMTR-MCNC: 58 MG/DL (ref 70–99)
GLUCOSE BLDC GLUCOMTR-MCNC: 66 MG/DL (ref 70–99)
GLUCOSE BLDC GLUCOMTR-MCNC: 67 MG/DL (ref 70–99)
GLUCOSE BLDC GLUCOMTR-MCNC: 77 MG/DL (ref 70–99)
GLUCOSE BLDC GLUCOMTR-MCNC: 78 MG/DL (ref 70–99)
GLUCOSE BLDC GLUCOMTR-MCNC: 81 MG/DL (ref 70–99)
GLUCOSE BLDC GLUCOMTR-MCNC: 94 MG/DL (ref 70–99)
GLUCOSE BLDC GLUCOMTR-MCNC: 95 MG/DL (ref 70–99)
GLUCOSE BLDC GLUCOMTR-MCNC: 96 MG/DL (ref 70–99)
GLUCOSE BLDC GLUCOMTR-MCNC: 98 MG/DL (ref 70–99)
GLUCOSE BLDC GLUCOMTR-MCNC: 98 MG/DL (ref 70–99)
GLUCOSE SERPL-MCNC: 115 MG/DL (ref 70–99)
GLUCOSE SERPL-MCNC: 54 MG/DL (ref 70–99)
GLUCOSE SERPL-MCNC: 96 MG/DL (ref 70–99)
GLUCOSE UR STRIP-MCNC: NEGATIVE MG/DL
GLUCOSE UR STRIP-MCNC: NEGATIVE MG/DL
HBA1C MFR BLD: 5.9 % (ref 0–5.6)
HBA1C MFR BLD: 6.5 % (ref 0–5.6)
HCO3 BLDV-SCNC: 25 MMOL/L (ref 21–28)
HCT VFR BLD AUTO: 32.8 % (ref 40–53)
HCT VFR BLD AUTO: 38.8 % (ref 40–53)
HCT VFR BLD AUTO: 39.1 % (ref 40–53)
HCT VFR BLD AUTO: 39.7 % (ref 40–53)
HCT VFR BLD AUTO: 40 % (ref 40–53)
HCT VFR BLD AUTO: 42.4 % (ref 40–53)
HGB BLD-MCNC: 10.5 G/DL (ref 13.3–17.7)
HGB BLD-MCNC: 12.6 G/DL (ref 13.3–17.7)
HGB BLD-MCNC: 12.8 G/DL (ref 13.3–17.7)
HGB BLD-MCNC: 12.8 G/DL (ref 13.3–17.7)
HGB BLD-MCNC: 13.3 G/DL (ref 13.3–17.7)
HGB BLD-MCNC: 13.9 G/DL (ref 13.3–17.7)
HGB UR QL STRIP: NEGATIVE
HGB UR QL STRIP: NEGATIVE
HYALINE CASTS: 32 /LPF
IMM GRANULOCYTES # BLD: 0 10E3/UL
IMM GRANULOCYTES # BLD: 0 10E9/L (ref 0–0.4)
IMM GRANULOCYTES # BLD: 0.1 10E3/UL
IMM GRANULOCYTES NFR BLD: 0.3 %
IMM GRANULOCYTES NFR BLD: 1 %
IMM GRANULOCYTES NFR BLD: 1 %
INR PPP: 1.84 (ref 0.85–1.15)
INR PPP: 2.38 (ref 0.85–1.15)
KETONES UR STRIP-MCNC: NEGATIVE MG/DL
KETONES UR STRIP-MCNC: NEGATIVE MG/DL
LABORATORY COMMENT REPORT: NORMAL
LACTATE BLD-SCNC: 2.8 MMOL/L (ref 0.7–2)
LACTATE BLD-SCNC: 3.3 MMOL/L (ref 0.7–2)
LACTATE SERPL-SCNC: 4 MMOL/L (ref 0.7–2)
LACTATE SERPL-SCNC: 4.5 MMOL/L (ref 0.7–2)
LEUKOCYTE ESTERASE UR QL STRIP: ABNORMAL
LEUKOCYTE ESTERASE UR QL STRIP: NEGATIVE
LYMPHOCYTES # BLD AUTO: 1.2 10E3/UL (ref 0.8–5.3)
LYMPHOCYTES # BLD AUTO: 1.7 10E9/L (ref 0.8–5.3)
LYMPHOCYTES # BLD AUTO: 1.8 10E3/UL (ref 0.8–5.3)
LYMPHOCYTES NFR BLD AUTO: 11 %
LYMPHOCYTES NFR BLD AUTO: 21.2 %
LYMPHOCYTES NFR BLD AUTO: 29 %
MCH RBC QN AUTO: 29.7 PG (ref 26.5–33)
MCH RBC QN AUTO: 29.9 PG (ref 26.5–33)
MCH RBC QN AUTO: 30.2 PG (ref 26.5–33)
MCH RBC QN AUTO: 30.7 PG (ref 26.5–33)
MCH RBC QN AUTO: 30.9 PG (ref 26.5–33)
MCH RBC QN AUTO: 31.1 PG (ref 26.5–33)
MCHC RBC AUTO-ENTMCNC: 32 G/DL (ref 31.5–36.5)
MCHC RBC AUTO-ENTMCNC: 32.2 G/DL (ref 31.5–36.5)
MCHC RBC AUTO-ENTMCNC: 32.2 G/DL (ref 31.5–36.5)
MCHC RBC AUTO-ENTMCNC: 32.8 G/DL (ref 31.5–36.5)
MCHC RBC AUTO-ENTMCNC: 33 G/DL (ref 31.5–36.5)
MCHC RBC AUTO-ENTMCNC: 33.3 G/DL (ref 31.5–36.5)
MCV RBC AUTO: 92 FL (ref 78–100)
MCV RBC AUTO: 93 FL (ref 78–100)
MCV RBC AUTO: 94 FL (ref 78–100)
MCV RBC AUTO: 95 FL (ref 78–100)
MONOCYTES # BLD AUTO: 0.4 10E3/UL (ref 0–1.3)
MONOCYTES # BLD AUTO: 0.4 10E3/UL (ref 0–1.3)
MONOCYTES # BLD AUTO: 0.7 10E9/L (ref 0–1.3)
MONOCYTES NFR BLD AUTO: 4 %
MONOCYTES NFR BLD AUTO: 6 %
MONOCYTES NFR BLD AUTO: 8.4 %
MUCOUS THREADS #/AREA URNS LPF: PRESENT /LPF
MUCOUS THREADS #/AREA URNS LPF: PRESENT /LPF
NEUTROPHILS # BLD AUTO: 3.7 10E3/UL (ref 1.6–8.3)
NEUTROPHILS # BLD AUTO: 5 10E9/L (ref 1.6–8.3)
NEUTROPHILS # BLD AUTO: 8.6 10E3/UL (ref 1.6–8.3)
NEUTROPHILS NFR BLD AUTO: 58 %
NEUTROPHILS NFR BLD AUTO: 64.3 %
NEUTROPHILS NFR BLD AUTO: 84 %
NITRATE UR QL: NEGATIVE
NITRATE UR QL: NEGATIVE
NRBC # BLD AUTO: 0 10*3/UL
NRBC # BLD AUTO: 0 10E3/UL
NRBC # BLD AUTO: 0 10E3/UL
NRBC BLD AUTO-RTO: 0 /100
NT-PROBNP SERPL-MCNC: 2195 PG/ML (ref 0–1800)
NT-PROBNP SERPL-MCNC: 4048 PG/ML (ref 0–1800)
NT-PROBNP SERPL-MCNC: 5788 PG/ML (ref 0–1800)
O2/TOTAL GAS SETTING VFR VENT: 97 %
OXYHGB MFR BLDV: 82 % (ref 70–75)
PCO2 BLDV: 42 MM HG (ref 40–50)
PH BLDV: 7.39 [PH] (ref 7.32–7.43)
PH UR STRIP: 5 [PH] (ref 4.7–8)
PH UR STRIP: 5.5 PH (ref 4.7–8)
PLATELET # BLD AUTO: 204 10E9/L (ref 150–450)
PLATELET # BLD AUTO: 240 10E9/L (ref 150–450)
PLATELET # BLD AUTO: 248 10E9/L (ref 150–450)
PLATELET # BLD AUTO: 259 10E3/UL (ref 150–450)
PLATELET # BLD AUTO: 56 10E3/UL (ref 150–450)
PLATELET # BLD AUTO: 72 10E3/UL (ref 150–450)
PO2 BLDV: 54 MM HG (ref 25–47)
POTASSIUM BLD-SCNC: 4 MMOL/L (ref 3.4–5.3)
POTASSIUM BLD-SCNC: 4.3 MMOL/L (ref 3.4–5.3)
POTASSIUM BLD-SCNC: 4.5 MMOL/L (ref 3.4–5.3)
POTASSIUM SERPL-SCNC: 3.6 MMOL/L (ref 3.4–5.3)
POTASSIUM SERPL-SCNC: 3.6 MMOL/L (ref 3.4–5.3)
POTASSIUM SERPL-SCNC: 4.3 MMOL/L (ref 3.4–5.3)
PROCALCITONIN SERPL-MCNC: <0.05 NG/ML
PROT SERPL-MCNC: 6.8 G/DL (ref 6.8–8.8)
PROT SERPL-MCNC: 7.1 G/DL (ref 6.8–8.8)
PROT SERPL-MCNC: 7.4 G/DL (ref 6.8–8.8)
RBC # BLD AUTO: 3.51 10E6/UL (ref 4.4–5.9)
RBC # BLD AUTO: 4.17 10E12/L (ref 4.4–5.9)
RBC # BLD AUTO: 4.24 10E6/UL (ref 4.4–5.9)
RBC # BLD AUTO: 4.24 10E6/UL (ref 4.4–5.9)
RBC # BLD AUTO: 4.3 10E12/L (ref 4.4–5.9)
RBC # BLD AUTO: 4.47 10E12/L (ref 4.4–5.9)
RBC #/AREA URNS AUTO: 1 /HPF (ref 0–2)
RBC URINE: <1 /HPF
SARS-COV-2 RNA RESP QL NAA+PROBE: NEGATIVE
SARS-COV-2 RNA RESP QL NAA+PROBE: NEGATIVE
SODIUM SERPL-SCNC: 141 MMOL/L (ref 133–144)
SODIUM SERPL-SCNC: 143 MMOL/L (ref 133–144)
SODIUM SERPL-SCNC: 143 MMOL/L (ref 133–144)
SODIUM SERPL-SCNC: 144 MMOL/L (ref 133–144)
SODIUM SERPL-SCNC: 144 MMOL/L (ref 133–144)
SODIUM SERPL-SCNC: 148 MMOL/L (ref 133–144)
SOURCE: ABNORMAL
SP GR UR STRIP: 1.02 (ref 1–1.03)
SP GR UR STRIP: 1.03 (ref 1–1.03)
SPECIMEN SOURCE: NORMAL
SQUAMOUS #/AREA URNS AUTO: 0 /HPF (ref 0–1)
TROPONIN I SERPL-MCNC: 0.02 UG/L (ref 0–0.04)
TROPONIN I SERPL-MCNC: 0.03 UG/L (ref 0–0.04)
TROPONIN I SERPL-MCNC: 0.03 UG/L (ref 0–0.04)
TROPONIN I SERPL-MCNC: 0.04 UG/L (ref 0–0.04)
TROPONIN I SERPL-MCNC: 0.05 UG/L (ref 0–0.04)
TROPONIN I SERPL-MCNC: 0.05 UG/L (ref 0–0.04)
TSH SERPL DL<=0.005 MIU/L-ACNC: 3.58 MU/L (ref 0.4–4)
UROBILINOGEN UR STRIP-MCNC: NORMAL MG/DL
UROBILINOGEN UR STRIP-MCNC: NORMAL MG/DL (ref 0–2)
WBC # BLD AUTO: 10.3 10E3/UL (ref 4–11)
WBC # BLD AUTO: 6.3 10E3/UL (ref 4–11)
WBC # BLD AUTO: 7.1 10E3/UL (ref 4–11)
WBC # BLD AUTO: 7.8 10E9/L (ref 4–11)
WBC # BLD AUTO: 7.9 10E9/L (ref 4–11)
WBC # BLD AUTO: 8 10E9/L (ref 4–11)
WBC #/AREA URNS AUTO: 1 /HPF (ref 0–5)
WBC URINE: 3 /HPF

## 2021-01-01 PROCEDURE — 80053 COMPREHEN METABOLIC PANEL: CPT | Performed by: EMERGENCY MEDICINE

## 2021-01-01 PROCEDURE — G0463 HOSPITAL OUTPT CLINIC VISIT: HCPCS | Mod: 25

## 2021-01-01 PROCEDURE — 99285 EMERGENCY DEPT VISIT HI MDM: CPT | Performed by: EMERGENCY MEDICINE

## 2021-01-01 PROCEDURE — 93306 TTE W/DOPPLER COMPLETE: CPT

## 2021-01-01 PROCEDURE — 83605 ASSAY OF LACTIC ACID: CPT | Performed by: INTERNAL MEDICINE

## 2021-01-01 PROCEDURE — 99223 1ST HOSP IP/OBS HIGH 75: CPT | Mod: AI | Performed by: INTERNAL MEDICINE

## 2021-01-01 PROCEDURE — 99231 SBSQ HOSP IP/OBS SF/LOW 25: CPT | Performed by: INTERNAL MEDICINE

## 2021-01-01 PROCEDURE — 250N000013 HC RX MED GY IP 250 OP 250 PS 637: Performed by: INTERNAL MEDICINE

## 2021-01-01 PROCEDURE — 250N000011 HC RX IP 250 OP 636: Performed by: EMERGENCY MEDICINE

## 2021-01-01 PROCEDURE — C9803 HOPD COVID-19 SPEC COLLECT: HCPCS

## 2021-01-01 PROCEDURE — 71045 X-RAY EXAM CHEST 1 VIEW: CPT

## 2021-01-01 PROCEDURE — 84484 ASSAY OF TROPONIN QUANT: CPT | Performed by: EMERGENCY MEDICINE

## 2021-01-01 PROCEDURE — 120N000001 HC R&B MED SURG/OB

## 2021-01-01 PROCEDURE — 93010 ELECTROCARDIOGRAM REPORT: CPT | Performed by: INTERNAL MEDICINE

## 2021-01-01 PROCEDURE — 99285 EMERGENCY DEPT VISIT HI MDM: CPT | Mod: 25

## 2021-01-01 PROCEDURE — 97597 DBRDMT OPN WND 1ST 20 CM/<: CPT | Performed by: NURSE PRACTITIONER

## 2021-01-01 PROCEDURE — 99239 HOSP IP/OBS DSCHRG MGMT >30: CPT | Performed by: INTERNAL MEDICINE

## 2021-01-01 PROCEDURE — 250N000011 HC RX IP 250 OP 636: Performed by: INTERNAL MEDICINE

## 2021-01-01 PROCEDURE — 81001 URINALYSIS AUTO W/SCOPE: CPT | Performed by: INTERNAL MEDICINE

## 2021-01-01 PROCEDURE — 93005 ELECTROCARDIOGRAM TRACING: CPT

## 2021-01-01 PROCEDURE — 80048 BASIC METABOLIC PNL TOTAL CA: CPT | Performed by: INTERNAL MEDICINE

## 2021-01-01 PROCEDURE — 36415 COLL VENOUS BLD VENIPUNCTURE: CPT | Performed by: EMERGENCY MEDICINE

## 2021-01-01 PROCEDURE — 80053 COMPREHEN METABOLIC PANEL: CPT | Performed by: PHYSICIAN ASSISTANT

## 2021-01-01 PROCEDURE — 250N000013 HC RX MED GY IP 250 OP 250 PS 637: Performed by: PHYSICIAN ASSISTANT

## 2021-01-01 PROCEDURE — U0003 INFECTIOUS AGENT DETECTION BY NUCLEIC ACID (DNA OR RNA); SEVERE ACUTE RESPIRATORY SYNDROME CORONAVIRUS 2 (SARS-COV-2) (CORONAVIRUS DISEASE [COVID-19]), AMPLIFIED PROBE TECHNIQUE, MAKING USE OF HIGH THROUGHPUT TECHNOLOGIES AS DESCRIBED BY CMS-2020-01-R: HCPCS | Performed by: PHYSICIAN ASSISTANT

## 2021-01-01 PROCEDURE — 85027 COMPLETE CBC AUTOMATED: CPT | Performed by: INTERNAL MEDICINE

## 2021-01-01 PROCEDURE — 85025 COMPLETE CBC W/AUTO DIFF WBC: CPT | Performed by: PHYSICIAN ASSISTANT

## 2021-01-01 PROCEDURE — 99213 OFFICE O/P EST LOW 20 MIN: CPT | Mod: 25 | Performed by: NURSE PRACTITIONER

## 2021-01-01 PROCEDURE — 82805 BLOOD GASES W/O2 SATURATION: CPT | Performed by: EMERGENCY MEDICINE

## 2021-01-01 PROCEDURE — 258N000003 HC RX IP 258 OP 636: Performed by: INTERNAL MEDICINE

## 2021-01-01 PROCEDURE — 83036 HEMOGLOBIN GLYCOSYLATED A1C: CPT | Performed by: INTERNAL MEDICINE

## 2021-01-01 PROCEDURE — 999N000157 HC STATISTIC RCP TIME EA 10 MIN

## 2021-01-01 PROCEDURE — 999N001017 HC STATISTIC GLUCOSE BY METER IP

## 2021-01-01 PROCEDURE — 87040 BLOOD CULTURE FOR BACTERIA: CPT | Performed by: EMERGENCY MEDICINE

## 2021-01-01 PROCEDURE — 36415 COLL VENOUS BLD VENIPUNCTURE: CPT | Performed by: INTERNAL MEDICINE

## 2021-01-01 PROCEDURE — 250N000011 HC RX IP 250 OP 636: Performed by: RADIOLOGY

## 2021-01-01 PROCEDURE — 36415 COLL VENOUS BLD VENIPUNCTURE: CPT | Performed by: PHYSICIAN ASSISTANT

## 2021-01-01 PROCEDURE — 85025 COMPLETE CBC W/AUTO DIFF WBC: CPT | Performed by: EMERGENCY MEDICINE

## 2021-01-01 PROCEDURE — 258N000001 HC RX 258: Performed by: EMERGENCY MEDICINE

## 2021-01-01 PROCEDURE — 85610 PROTHROMBIN TIME: CPT | Performed by: EMERGENCY MEDICINE

## 2021-01-01 PROCEDURE — 83605 ASSAY OF LACTIC ACID: CPT | Performed by: EMERGENCY MEDICINE

## 2021-01-01 PROCEDURE — 94640 AIRWAY INHALATION TREATMENT: CPT

## 2021-01-01 PROCEDURE — 87040 BLOOD CULTURE FOR BACTERIA: CPT | Performed by: INTERNAL MEDICINE

## 2021-01-01 PROCEDURE — 84484 ASSAY OF TROPONIN QUANT: CPT | Performed by: INTERNAL MEDICINE

## 2021-01-01 PROCEDURE — 250N000012 HC RX MED GY IP 250 OP 636 PS 637: Performed by: INTERNAL MEDICINE

## 2021-01-01 PROCEDURE — 93306 TTE W/DOPPLER COMPLETE: CPT | Mod: 26 | Performed by: INTERNAL MEDICINE

## 2021-01-01 PROCEDURE — 258N000001 HC RX 258: Performed by: INTERNAL MEDICINE

## 2021-01-01 PROCEDURE — 83880 ASSAY OF NATRIURETIC PEPTIDE: CPT | Performed by: EMERGENCY MEDICINE

## 2021-01-01 PROCEDURE — 258N000001 HC RX 258

## 2021-01-01 PROCEDURE — 83880 ASSAY OF NATRIURETIC PEPTIDE: CPT | Performed by: PHYSICIAN ASSISTANT

## 2021-01-01 PROCEDURE — 85610 PROTHROMBIN TIME: CPT | Performed by: INTERNAL MEDICINE

## 2021-01-01 PROCEDURE — 84145 PROCALCITONIN (PCT): CPT | Performed by: INTERNAL MEDICINE

## 2021-01-01 PROCEDURE — G0463 HOSPITAL OUTPT CLINIC VISIT: HCPCS

## 2021-01-01 PROCEDURE — 999N001182 HC STATISTIC ESTIMATED AVERAGE GLUCOSE: Performed by: INTERNAL MEDICINE

## 2021-01-01 PROCEDURE — 258N000003 HC RX IP 258 OP 636: Performed by: EMERGENCY MEDICINE

## 2021-01-01 PROCEDURE — 82040 ASSAY OF SERUM ALBUMIN: CPT | Performed by: EMERGENCY MEDICINE

## 2021-01-01 PROCEDURE — 84443 ASSAY THYROID STIM HORMONE: CPT | Performed by: INTERNAL MEDICINE

## 2021-01-01 PROCEDURE — 250N000009 HC RX 250: Performed by: EMERGENCY MEDICINE

## 2021-01-01 PROCEDURE — 99232 SBSQ HOSP IP/OBS MODERATE 35: CPT | Performed by: INTERNAL MEDICINE

## 2021-01-01 PROCEDURE — 97161 PT EVAL LOW COMPLEX 20 MIN: CPT | Mod: GP

## 2021-01-01 PROCEDURE — 81001 URINALYSIS AUTO W/SCOPE: CPT | Performed by: PHYSICIAN ASSISTANT

## 2021-01-01 PROCEDURE — 99233 SBSQ HOSP IP/OBS HIGH 50: CPT | Performed by: INTERNAL MEDICINE

## 2021-01-01 PROCEDURE — U0005 INFEC AGEN DETEC AMPLI PROBE: HCPCS | Performed by: EMERGENCY MEDICINE

## 2021-01-01 PROCEDURE — U0005 INFEC AGEN DETEC AMPLI PROBE: HCPCS | Performed by: PHYSICIAN ASSISTANT

## 2021-01-01 PROCEDURE — 250N000009 HC RX 250: Performed by: INTERNAL MEDICINE

## 2021-01-01 PROCEDURE — 71275 CT ANGIOGRAPHY CHEST: CPT

## 2021-01-01 PROCEDURE — 99212 OFFICE O/P EST SF 10 MIN: CPT | Performed by: NURSE PRACTITIONER

## 2021-01-01 PROCEDURE — 99285 EMERGENCY DEPT VISIT HI MDM: CPT | Performed by: PHYSICIAN ASSISTANT

## 2021-01-01 PROCEDURE — 250N000011 HC RX IP 250 OP 636: Performed by: PHYSICIAN ASSISTANT

## 2021-01-01 PROCEDURE — 96374 THER/PROPH/DIAG INJ IV PUSH: CPT

## 2021-01-01 PROCEDURE — 97165 OT EVAL LOW COMPLEX 30 MIN: CPT | Mod: GO

## 2021-01-01 PROCEDURE — 83605 ASSAY OF LACTIC ACID: CPT | Performed by: PHYSICIAN ASSISTANT

## 2021-01-01 PROCEDURE — 84484 ASSAY OF TROPONIN QUANT: CPT | Performed by: PHYSICIAN ASSISTANT

## 2021-01-01 RX ORDER — ATROPINE SULFATE 10 MG/ML
2 SOLUTION/ DROPS OPHTHALMIC EVERY 4 HOURS PRN
Qty: 5 ML | Refills: 0 | Status: SHIPPED | OUTPATIENT
Start: 2021-01-01

## 2021-01-01 RX ORDER — FUROSEMIDE 20 MG
10 TABLET ORAL DAILY
Qty: 30 TABLET | Refills: 0 | Status: SHIPPED | OUTPATIENT
Start: 2021-01-01 | End: 2021-01-01

## 2021-01-01 RX ORDER — DEXTROSE MONOHYDRATE 25 G/50ML
50 INJECTION, SOLUTION INTRAVENOUS ONCE
Status: DISCONTINUED | OUTPATIENT
Start: 2021-01-01 | End: 2021-01-01

## 2021-01-01 RX ORDER — FUROSEMIDE 10 MG/ML
40 INJECTION INTRAMUSCULAR; INTRAVENOUS ONCE
Status: COMPLETED | OUTPATIENT
Start: 2021-01-01 | End: 2021-01-01

## 2021-01-01 RX ORDER — DEXTROSE MONOHYDRATE 100 MG/ML
INJECTION, SOLUTION INTRAVENOUS CONTINUOUS
Status: DISCONTINUED | OUTPATIENT
Start: 2021-01-01 | End: 2021-01-01

## 2021-01-01 RX ORDER — ISOPROPYL ALCOHOL 0.75 G/1
SWAB TOPICAL
Status: ON HOLD | COMMUNITY
Start: 2021-01-01 | End: 2021-01-01

## 2021-01-01 RX ORDER — TIMOLOL MALEATE 5 MG/ML
1 SOLUTION/ DROPS OPHTHALMIC 2 TIMES DAILY
Status: DISCONTINUED | OUTPATIENT
Start: 2021-01-01 | End: 2021-01-01 | Stop reason: CLARIF

## 2021-01-01 RX ORDER — BUMETANIDE 0.25 MG/ML
2 INJECTION INTRAMUSCULAR; INTRAVENOUS ONCE
Status: COMPLETED | OUTPATIENT
Start: 2021-01-01 | End: 2021-01-01

## 2021-01-01 RX ORDER — ONDANSETRON 4 MG/1
4 TABLET, ORALLY DISINTEGRATING ORAL EVERY 6 HOURS PRN
Qty: 30 TABLET | Refills: 0 | Status: SHIPPED | OUTPATIENT
Start: 2021-01-01

## 2021-01-01 RX ORDER — FUROSEMIDE 20 MG
10 TABLET ORAL DAILY
Qty: 30 TABLET | Refills: 0 | Status: ON HOLD | OUTPATIENT
Start: 2021-01-01 | End: 2021-01-01

## 2021-01-01 RX ORDER — FAMOTIDINE 20 MG/1
20 TABLET, FILM COATED ORAL 2 TIMES DAILY
Status: DISCONTINUED | OUTPATIENT
Start: 2021-01-01 | End: 2021-01-01 | Stop reason: HOSPADM

## 2021-01-01 RX ORDER — LIDOCAINE 40 MG/G
CREAM TOPICAL
Status: DISCONTINUED | OUTPATIENT
Start: 2021-01-01 | End: 2021-01-01 | Stop reason: HOSPADM

## 2021-01-01 RX ORDER — NALOXONE HYDROCHLORIDE 0.4 MG/ML
0.2 INJECTION, SOLUTION INTRAMUSCULAR; INTRAVENOUS; SUBCUTANEOUS
Status: DISCONTINUED | OUTPATIENT
Start: 2021-01-01 | End: 2021-01-01 | Stop reason: CLARIF

## 2021-01-01 RX ORDER — METFORMIN HCL 500 MG
1000 TABLET, EXTENDED RELEASE 24 HR ORAL 2 TIMES DAILY WITH MEALS
Status: ON HOLD | COMMUNITY
Start: 2021-01-01 | End: 2021-01-01

## 2021-01-01 RX ORDER — LORAZEPAM 1 MG/1
1 TABLET ORAL
Qty: 30 TABLET | Refills: 0 | Status: SHIPPED | OUTPATIENT
Start: 2021-01-01

## 2021-01-01 RX ORDER — DEXTROSE MONOHYDRATE 100 MG/ML
INJECTION, SOLUTION INTRAVENOUS
Status: COMPLETED
Start: 2021-01-01 | End: 2021-01-01

## 2021-01-01 RX ORDER — RISPERIDONE 0.25 MG/1
0.25 TABLET ORAL 2 TIMES DAILY
Status: ON HOLD | COMMUNITY
Start: 2021-01-01 | End: 2021-01-01

## 2021-01-01 RX ORDER — LISINOPRIL 5 MG/1
20 TABLET ORAL ONCE
Status: COMPLETED | OUTPATIENT
Start: 2021-01-01 | End: 2021-01-01

## 2021-01-01 RX ORDER — CALCIUM CARBONATE 500 MG/1
1000 TABLET, CHEWABLE ORAL 4 TIMES DAILY PRN
Status: DISCONTINUED | OUTPATIENT
Start: 2021-01-01 | End: 2021-01-01 | Stop reason: HOSPADM

## 2021-01-01 RX ORDER — GLIPIZIDE 5 MG/1
5 TABLET, FILM COATED, EXTENDED RELEASE ORAL DAILY
Status: ON HOLD | COMMUNITY
Start: 2021-01-01 | End: 2021-01-01

## 2021-01-01 RX ORDER — LISINOPRIL 10 MG/1
10 TABLET ORAL DAILY
Status: DISCONTINUED | OUTPATIENT
Start: 2021-01-01 | End: 2021-01-01 | Stop reason: HOSPADM

## 2021-01-01 RX ORDER — NICOTINE POLACRILEX 4 MG
15-30 LOZENGE BUCCAL
Status: DISCONTINUED | OUTPATIENT
Start: 2021-01-01 | End: 2021-01-01 | Stop reason: HOSPADM

## 2021-01-01 RX ORDER — RISPERIDONE 0.25 MG/1
0.75 TABLET ORAL 2 TIMES DAILY
Status: DISCONTINUED | OUTPATIENT
Start: 2021-01-01 | End: 2021-01-01 | Stop reason: HOSPADM

## 2021-01-01 RX ORDER — ONDANSETRON 2 MG/ML
4 INJECTION INTRAMUSCULAR; INTRAVENOUS EVERY 6 HOURS PRN
Status: DISCONTINUED | OUTPATIENT
Start: 2021-01-01 | End: 2021-01-01 | Stop reason: HOSPADM

## 2021-01-01 RX ORDER — MORPHINE SULFATE 20 MG/ML
5-10 SOLUTION ORAL
Qty: 15 ML | Refills: 0 | Status: SHIPPED | OUTPATIENT
Start: 2021-01-01 | End: 2021-01-01

## 2021-01-01 RX ORDER — TRIAMCINOLONE ACETONIDE 1 MG/G
CREAM TOPICAL 2 TIMES DAILY PRN
Status: ON HOLD | COMMUNITY
End: 2021-01-01

## 2021-01-01 RX ORDER — DEXTROSE MONOHYDRATE 25 G/50ML
25-50 INJECTION, SOLUTION INTRAVENOUS
Status: DISCONTINUED | OUTPATIENT
Start: 2021-01-01 | End: 2021-01-01 | Stop reason: CLARIF

## 2021-01-01 RX ORDER — TAMSULOSIN HYDROCHLORIDE 0.4 MG/1
0.4 CAPSULE ORAL AT BEDTIME
Status: DISCONTINUED | OUTPATIENT
Start: 2021-01-01 | End: 2021-01-01 | Stop reason: HOSPADM

## 2021-01-01 RX ORDER — TAMSULOSIN HYDROCHLORIDE 0.4 MG/1
0.4 CAPSULE ORAL DAILY
Status: DISCONTINUED | OUTPATIENT
Start: 2021-01-01 | End: 2021-01-01 | Stop reason: HOSPADM

## 2021-01-01 RX ORDER — ONDANSETRON 4 MG/1
4 TABLET, ORALLY DISINTEGRATING ORAL EVERY 6 HOURS PRN
Status: DISCONTINUED | OUTPATIENT
Start: 2021-01-01 | End: 2021-01-01 | Stop reason: HOSPADM

## 2021-01-01 RX ORDER — IOPAMIDOL 755 MG/ML
100 INJECTION, SOLUTION INTRAVASCULAR ONCE
Status: COMPLETED | OUTPATIENT
Start: 2021-01-01 | End: 2021-01-01

## 2021-01-01 RX ORDER — FUROSEMIDE 10 MG/ML
20 INJECTION INTRAMUSCULAR; INTRAVENOUS ONCE
Status: COMPLETED | OUTPATIENT
Start: 2021-01-01 | End: 2021-01-01

## 2021-01-01 RX ORDER — DEXTROSE MONOHYDRATE 25 G/50ML
25-50 INJECTION, SOLUTION INTRAVENOUS
Status: DISCONTINUED | OUTPATIENT
Start: 2021-01-01 | End: 2021-01-01 | Stop reason: HOSPADM

## 2021-01-01 RX ORDER — SODIUM HYPOCHLORITE 2.5 MG/ML
SOLUTION TOPICAL
Qty: 473 ML | Refills: 1 | Status: ON HOLD | OUTPATIENT
Start: 2021-01-01 | End: 2021-01-01

## 2021-01-01 RX ORDER — CEFTRIAXONE SODIUM 2 G/50ML
2 INJECTION, SOLUTION INTRAVENOUS EVERY 24 HOURS
Status: DISCONTINUED | OUTPATIENT
Start: 2021-01-01 | End: 2021-01-01

## 2021-01-01 RX ORDER — ALBUTEROL SULFATE 0.83 MG/ML
2.5 SOLUTION RESPIRATORY (INHALATION)
Status: DISCONTINUED | OUTPATIENT
Start: 2021-01-01 | End: 2021-01-01 | Stop reason: HOSPADM

## 2021-01-01 RX ORDER — NICOTINE POLACRILEX 4 MG
15-30 LOZENGE BUCCAL
Status: DISCONTINUED | OUTPATIENT
Start: 2021-01-01 | End: 2021-01-01 | Stop reason: CLARIF

## 2021-01-01 RX ORDER — LATANOPROST 50 UG/ML
1 SOLUTION/ DROPS OPHTHALMIC AT BEDTIME
Status: DISCONTINUED | OUTPATIENT
Start: 2021-01-01 | End: 2021-01-01 | Stop reason: CLARIF

## 2021-01-01 RX ORDER — SIMVASTATIN 40 MG
40 TABLET ORAL AT BEDTIME
Status: DISCONTINUED | OUTPATIENT
Start: 2021-01-01 | End: 2021-01-01 | Stop reason: HOSPADM

## 2021-01-01 RX ORDER — ACETAMINOPHEN 325 MG/1
325 TABLET ORAL EVERY 4 HOURS PRN
Status: DISCONTINUED | OUTPATIENT
Start: 2021-01-01 | End: 2021-01-01 | Stop reason: HOSPADM

## 2021-01-01 RX ORDER — LORAZEPAM 2 MG/ML
1 INJECTION INTRAMUSCULAR
Status: DISCONTINUED | OUTPATIENT
Start: 2021-01-01 | End: 2021-01-01 | Stop reason: CLARIF

## 2021-01-01 RX ORDER — SIMVASTATIN 40 MG
40 TABLET ORAL AT BEDTIME
Status: ON HOLD | COMMUNITY
Start: 2021-01-01 | End: 2021-01-01

## 2021-01-01 RX ORDER — TIMOLOL MALEATE 2.5 MG/ML
1 SOLUTION/ DROPS OPHTHALMIC 2 TIMES DAILY
Status: DISCONTINUED | OUTPATIENT
Start: 2021-01-01 | End: 2021-01-01 | Stop reason: HOSPADM

## 2021-01-01 RX ORDER — MORPHINE SULFATE 10 MG/5ML
5-10 SOLUTION ORAL
Status: DISCONTINUED | OUTPATIENT
Start: 2021-01-01 | End: 2021-01-01 | Stop reason: HOSPADM

## 2021-01-01 RX ORDER — FUROSEMIDE 20 MG
20 TABLET ORAL 2 TIMES DAILY
Status: ON HOLD | COMMUNITY
End: 2021-01-01

## 2021-01-01 RX ORDER — ATROPINE SULFATE 10 MG/ML
2 SOLUTION/ DROPS OPHTHALMIC EVERY 4 HOURS PRN
Status: DISCONTINUED | OUTPATIENT
Start: 2021-01-01 | End: 2021-01-01 | Stop reason: HOSPADM

## 2021-01-01 RX ORDER — ALBUTEROL SULFATE 108 UG/1
AEROSOL, METERED RESPIRATORY (INHALATION)
COMMUNITY
Start: 2021-01-01

## 2021-01-01 RX ORDER — LATANOPROST 50 UG/ML
1 SOLUTION/ DROPS OPHTHALMIC AT BEDTIME
Status: DISCONTINUED | OUTPATIENT
Start: 2021-01-01 | End: 2021-01-01 | Stop reason: HOSPADM

## 2021-01-01 RX ORDER — SODIUM CHLORIDE 9 MG/ML
INJECTION, SOLUTION INTRAVENOUS CONTINUOUS
Status: DISCONTINUED | OUTPATIENT
Start: 2021-01-01 | End: 2021-01-01

## 2021-01-01 RX ORDER — LORATADINE 10 MG/1
10 TABLET ORAL DAILY
Status: DISCONTINUED | OUTPATIENT
Start: 2021-01-01 | End: 2021-01-01 | Stop reason: HOSPADM

## 2021-01-01 RX ORDER — LANCETS 21 GAUGE
EACH MISCELLANEOUS
Status: ON HOLD | COMMUNITY
Start: 2021-01-01 | End: 2021-01-01

## 2021-01-01 RX ORDER — LORAZEPAM 1 MG/1
1 TABLET ORAL
Status: DISCONTINUED | OUTPATIENT
Start: 2021-01-01 | End: 2021-01-01 | Stop reason: HOSPADM

## 2021-01-01 RX ORDER — ACETAMINOPHEN 325 MG/1
325 TABLET ORAL EVERY 4 HOURS PRN
Status: DISCONTINUED | OUTPATIENT
Start: 2021-01-01 | End: 2021-01-01 | Stop reason: DRUGHIGH

## 2021-01-01 RX ORDER — FUROSEMIDE 10 MG/ML
40 INJECTION INTRAMUSCULAR; INTRAVENOUS DAILY
Status: COMPLETED | OUTPATIENT
Start: 2021-01-01 | End: 2021-01-01

## 2021-01-01 RX ORDER — NALOXONE HYDROCHLORIDE 0.4 MG/ML
0.1 INJECTION, SOLUTION INTRAMUSCULAR; INTRAVENOUS; SUBCUTANEOUS
Status: DISCONTINUED | OUTPATIENT
Start: 2021-01-01 | End: 2021-01-01 | Stop reason: CLARIF

## 2021-01-01 RX ORDER — MORPHINE SULFATE 20 MG/ML
5-10 SOLUTION ORAL
Qty: 30 ML | Refills: 0 | Status: SHIPPED | OUTPATIENT
Start: 2021-01-01

## 2021-01-01 RX ORDER — METOPROLOL SUCCINATE 50 MG/1
50 TABLET, EXTENDED RELEASE ORAL DAILY
Status: DISCONTINUED | OUTPATIENT
Start: 2021-01-01 | End: 2021-01-01 | Stop reason: HOSPADM

## 2021-01-01 RX ORDER — MORPHINE SULFATE 20 MG/ML
5-10 SOLUTION ORAL
Status: DISCONTINUED | OUTPATIENT
Start: 2021-01-01 | End: 2021-01-01 | Stop reason: HOSPADM

## 2021-01-01 RX ORDER — DEXTROSE MONOHYDRATE 100 MG/ML
INJECTION, SOLUTION INTRAVENOUS
Status: DISPENSED
Start: 2021-01-01 | End: 2021-01-01

## 2021-01-01 RX ORDER — METOPROLOL TARTRATE 50 MG
50 TABLET ORAL ONCE
Status: COMPLETED | OUTPATIENT
Start: 2021-01-01 | End: 2021-01-01

## 2021-01-01 RX ORDER — ALBUTEROL SULFATE 0.83 MG/ML
2.5 SOLUTION RESPIRATORY (INHALATION) EVERY 4 HOURS PRN
Qty: 3 ML | Refills: 0 | Status: ON HOLD | OUTPATIENT
Start: 2021-01-01 | End: 2021-01-01

## 2021-01-01 RX ORDER — RISPERIDONE 0.5 MG/1
0.5 TABLET ORAL 2 TIMES DAILY
Status: ON HOLD | COMMUNITY
Start: 2021-01-01 | End: 2021-01-01

## 2021-01-01 RX ORDER — ALBUTEROL SULFATE 0.83 MG/ML
2.5 SOLUTION RESPIRATORY (INHALATION) EVERY 4 HOURS PRN
Status: DISCONTINUED | OUTPATIENT
Start: 2021-01-01 | End: 2021-01-01 | Stop reason: HOSPADM

## 2021-01-01 RX ORDER — ACETAMINOPHEN 325 MG/1
650 TABLET ORAL EVERY 4 HOURS PRN
Status: DISCONTINUED | OUTPATIENT
Start: 2021-01-01 | End: 2021-01-01 | Stop reason: HOSPADM

## 2021-01-01 RX ORDER — RISPERIDONE 0.25 MG/1
0.5 TABLET ORAL 2 TIMES DAILY
Status: DISCONTINUED | OUTPATIENT
Start: 2021-01-01 | End: 2021-01-01

## 2021-01-01 RX ORDER — LANOLIN ALCOHOL/MO/W.PET/CERES
3 CREAM (GRAM) TOPICAL
Status: DISCONTINUED | OUTPATIENT
Start: 2021-01-01 | End: 2021-01-01 | Stop reason: HOSPADM

## 2021-01-01 RX ORDER — METOPROLOL SUCCINATE 25 MG/1
25 TABLET, EXTENDED RELEASE ORAL DAILY
Status: DISCONTINUED | OUTPATIENT
Start: 2021-01-01 | End: 2021-01-01

## 2021-01-01 RX ORDER — IPRATROPIUM BROMIDE AND ALBUTEROL SULFATE 2.5; .5 MG/3ML; MG/3ML
3 SOLUTION RESPIRATORY (INHALATION) ONCE
Status: COMPLETED | OUTPATIENT
Start: 2021-01-01 | End: 2021-01-01

## 2021-01-01 RX ADMIN — FUROSEMIDE 40 MG: 10 INJECTION, SOLUTION INTRAMUSCULAR; INTRAVENOUS at 09:00

## 2021-01-01 RX ADMIN — IOPAMIDOL 100 ML: 755 INJECTION, SOLUTION INTRAVENOUS at 16:07

## 2021-01-01 RX ADMIN — DEXTROSE MONOHYDRATE: 100 INJECTION, SOLUTION INTRAVENOUS at 00:41

## 2021-01-01 RX ADMIN — MORPHINE SULFATE 10 MG: 10 SOLUTION ORAL at 22:12

## 2021-01-01 RX ADMIN — METOPROLOL TARTRATE 50 MG: 50 TABLET, FILM COATED ORAL at 17:28

## 2021-01-01 RX ADMIN — RISPERIDONE 0.5 MG: 0.25 TABLET ORAL at 09:14

## 2021-01-01 RX ADMIN — INSULIN ASPART 1 UNITS: 100 INJECTION, SOLUTION INTRAVENOUS; SUBCUTANEOUS at 17:38

## 2021-01-01 RX ADMIN — SODIUM CHLORIDE: 9 INJECTION, SOLUTION INTRAVENOUS at 12:27

## 2021-01-01 RX ADMIN — BUMETANIDE 2 MG: 0.25 INJECTION INTRAMUSCULAR; INTRAVENOUS at 18:07

## 2021-01-01 RX ADMIN — TIMOLOL MALEATE 1 DROP: 2.5 SOLUTION/ DROPS OPHTHALMIC at 10:55

## 2021-01-01 RX ADMIN — APIXABAN 5 MG: 2.5 TABLET, FILM COATED ORAL at 08:26

## 2021-01-01 RX ADMIN — FAMOTIDINE 20 MG: 20 TABLET, FILM COATED ORAL at 08:26

## 2021-01-01 RX ADMIN — CEFTRIAXONE SODIUM 2 G: 2 INJECTION, SOLUTION INTRAVENOUS at 16:39

## 2021-01-01 RX ADMIN — RISPERIDONE 0.75 MG: 0.25 TABLET ORAL at 08:26

## 2021-01-01 RX ADMIN — FUROSEMIDE 20 MG: 10 INJECTION, SOLUTION INTRAMUSCULAR; INTRAVENOUS at 15:40

## 2021-01-01 RX ADMIN — METOPROLOL SUCCINATE 50 MG: 50 TABLET, EXTENDED RELEASE ORAL at 08:26

## 2021-01-01 RX ADMIN — LISINOPRIL 10 MG: 10 TABLET ORAL at 08:26

## 2021-01-01 RX ADMIN — DEXTROSE MONOHYDRATE: 100 INJECTION, SOLUTION INTRAVENOUS at 16:53

## 2021-01-01 RX ADMIN — RISPERIDONE 0.5 MG: 0.25 TABLET ORAL at 22:58

## 2021-01-01 RX ADMIN — DEXTROSE MONOHYDRATE: 100 INJECTION, SOLUTION INTRAVENOUS at 08:20

## 2021-01-01 RX ADMIN — LISINOPRIL 10 MG: 10 TABLET ORAL at 09:14

## 2021-01-01 RX ADMIN — METOPROLOL SUCCINATE 25 MG: 25 TABLET, EXTENDED RELEASE ORAL at 09:14

## 2021-01-01 RX ADMIN — TIMOLOL MALEATE 1 DROP: 2.5 SOLUTION/ DROPS OPHTHALMIC at 08:27

## 2021-01-01 RX ADMIN — FAMOTIDINE 20 MG: 20 TABLET, FILM COATED ORAL at 09:14

## 2021-01-01 RX ADMIN — TAMSULOSIN HYDROCHLORIDE 0.4 MG: 0.4 CAPSULE ORAL at 21:24

## 2021-01-01 RX ADMIN — SODIUM CHLORIDE 1000 ML: 9 INJECTION, SOLUTION INTRAVENOUS at 21:20

## 2021-01-01 RX ADMIN — APIXABAN 5 MG: 2.5 TABLET, FILM COATED ORAL at 22:58

## 2021-01-01 RX ADMIN — FUROSEMIDE 40 MG: 10 INJECTION, SOLUTION INTRAMUSCULAR; INTRAVENOUS at 14:48

## 2021-01-01 RX ADMIN — DEXTROSE MONOHYDRATE: 100 INJECTION, SOLUTION INTRAVENOUS at 02:15

## 2021-01-01 RX ADMIN — LATANOPROST 1 DROP: 50 SOLUTION OPHTHALMIC at 21:35

## 2021-01-01 RX ADMIN — TIMOLOL MALEATE 1 DROP: 2.5 SOLUTION/ DROPS OPHTHALMIC at 21:32

## 2021-01-01 RX ADMIN — DEXTROSE MONOHYDRATE 50 ML: 500 INJECTION PARENTERAL at 05:22

## 2021-01-01 RX ADMIN — RISPERIDONE 0.75 MG: 0.25 TABLET ORAL at 21:24

## 2021-01-01 RX ADMIN — MORPHINE SULFATE 5 MG: 10 SOLUTION ORAL at 19:35

## 2021-01-01 RX ADMIN — DEXTROSE MONOHYDRATE: 100 INJECTION, SOLUTION INTRAVENOUS at 09:01

## 2021-01-01 RX ADMIN — APIXABAN 5 MG: 2.5 TABLET, FILM COATED ORAL at 21:24

## 2021-01-01 RX ADMIN — MORPHINE SULFATE 5 MG: 10 SOLUTION ORAL at 13:00

## 2021-01-01 RX ADMIN — AZITHROMYCIN 500 MG: 500 INJECTION, POWDER, LYOPHILIZED, FOR SOLUTION INTRAVENOUS at 17:21

## 2021-01-01 RX ADMIN — DEXTROSE MONOHYDRATE: 100 INJECTION, SOLUTION INTRAVENOUS at 12:41

## 2021-01-01 RX ADMIN — FAMOTIDINE 20 MG: 20 TABLET, FILM COATED ORAL at 22:58

## 2021-01-01 RX ADMIN — SODIUM CHLORIDE 1000 ML: 9 INJECTION, SOLUTION INTRAVENOUS at 02:34

## 2021-01-01 RX ADMIN — FAMOTIDINE 20 MG: 20 TABLET, FILM COATED ORAL at 21:24

## 2021-01-01 RX ADMIN — APIXABAN 5 MG: 2.5 TABLET, FILM COATED ORAL at 09:13

## 2021-01-01 RX ADMIN — IPRATROPIUM BROMIDE AND ALBUTEROL SULFATE 3 ML: .5; 3 SOLUTION RESPIRATORY (INHALATION) at 14:16

## 2021-01-01 RX ADMIN — LISINOPRIL 20 MG: 5 TABLET ORAL at 17:28

## 2021-01-01 RX ADMIN — ALBUTEROL SULFATE 2.5 MG: 2.5 SOLUTION RESPIRATORY (INHALATION) at 15:46

## 2021-01-01 ASSESSMENT — ENCOUNTER SYMPTOMS
SHORTNESS OF BREATH: 1
GASTROINTESTINAL NEGATIVE: 1
CONSTITUTIONAL NEGATIVE: 1
COUGH: 1
MUSCULOSKELETAL NEGATIVE: 1
EYES NEGATIVE: 1
CARDIOVASCULAR NEGATIVE: 1

## 2021-01-01 ASSESSMENT — ACTIVITIES OF DAILY LIVING (ADL)
TOILETING_ISSUES: YES
ADLS_ACUITY_SCORE: 23
HEARING_DIFFICULTY_OR_DEAF: NO
ADLS_ACUITY_SCORE: 23
TOILETING_ASSISTANCE: TOILETING DIFFICULTY, DEPENDENT
TOILETING_MANAGEMENT: WEARS BRIEF
ADLS_ACUITY_SCORE: 24
ADLS_ACUITY_SCORE: 23
ADLS_ACUITY_SCORE: 19
ADLS_ACUITY_SCORE: 25
ADLS_ACUITY_SCORE: 23
ADLS_ACUITY_SCORE: 23
ADLS_ACUITY_SCORE: 24
ADLS_ACUITY_SCORE: 23
ADLS_ACUITY_SCORE: 25
EQUIPMENT_CURRENTLY_USED_AT_HOME: OTHER (SEE COMMENTS)
ADLS_ACUITY_SCORE: 23
WEAR_GLASSES_OR_BLIND: NO
ADLS_ACUITY_SCORE: 25
ADLS_ACUITY_SCORE: 23
ADLS_ACUITY_SCORE: 23
ADLS_ACUITY_SCORE: 19
ADLS_ACUITY_SCORE: 23
ADLS_ACUITY_SCORE: 23
CONCENTRATING,_REMEMBERING_OR_MAKING_DECISIONS_DIFFICULTY: NO
ADLS_ACUITY_SCORE: 14
ADLS_ACUITY_SCORE: 24
ADLS_ACUITY_SCORE: 23
DIFFICULTY_EATING/SWALLOWING: NO
DIFFICULTY_COMMUNICATING: NO
DRESSING/BATHING_DIFFICULTY: YES
ADLS_ACUITY_SCORE: 23
ADLS_ACUITY_SCORE: 24
ADLS_ACUITY_SCORE: 19
ADLS_ACUITY_SCORE: 23

## 2021-01-01 ASSESSMENT — PAIN SCALES - GENERAL
PAINLEVEL: NO PAIN (0)

## 2021-03-22 PROBLEM — R41.0 CONFUSION: Status: ACTIVE | Noted: 2021-01-01

## 2021-03-22 PROBLEM — R06.82 TACHYPNEA: Status: ACTIVE | Noted: 2021-01-01

## 2021-03-22 NOTE — ED NOTES
Bed: ED03  Expected date:   Expected time:   Means of arrival:   Comments:  [facility]

## 2021-03-22 NOTE — ED NOTES
"Pt comes from [facility] with increased weakness and confusion. Per HC nurse, pt was too weak to get up into wheelchair today.  Pt recently recovered from a bought of PNA and had finished a 10 day course of ABTX and improved the last few days but has not been well today or yesterday.  Pt arrives disheveled, dried stool on shoes, incontinent of urine. Pt cleaned up and cath for urine.   Scrotum is edematous to the size of a football. Pt states, \"it happens-will it go down?\".  BLE +2.   Intermittent exertional wheezing heard.     "

## 2021-03-22 NOTE — DISCHARGE INSTRUCTIONS

## 2021-03-22 NOTE — ED NOTES
No diarrhea stool thus far in his stay per unit assistants. Still need sample for C diff. Await CT

## 2021-03-22 NOTE — ED TRIAGE NOTES
Comes by ambulance from [facility] where ihe was noted to be lethargic and confused. They report pt having diarrhea since he was treated with 10 day of antibiotic for pneumonia. Pt is usually alert and oriented and walks with walker, today had to be in wheelchair. Denies pain, has audible wheezing noted at times.

## 2021-03-22 NOTE — ED NOTES
DATE:  3/22/2021   TIME OF RECEIPT FROM LAB:  1416  LAB TEST:  lactic  LAB VALUE:  2.8  RESULTS GIVEN WITH READ-BACK TO (PROVIDER):  Jonh RAMACHANDRAN  TIME LAB VALUE REPORTED TO PROVIDER:   [id number]

## 2021-03-23 NOTE — PROGRESS NOTES
[facility]    Hospitalist Progress Note    Date of Service (when I saw the patient): 03/23/2021    Assessment & Plan   Florentin Reyes is a 80 year old male who was admitted on 3/22/2021.    New onset AFIB with controlled rate: continue with metoprolol, telemetry monitoring, start Eliquis and tolerating well, TSH wnl.     Possible acute CHF: could be rhythm and/or rate dependent CHF, currently does not appear volume overloaded, but does have small to moderate pleural effusions b/l on CXR and CT.  No sob, no increased wob, no hypoxia.  Responded well to empiric Lasix, continue Metoprolol and lisinopril.  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  -low blood sugars today holding lantus  -insulin sliding scale coverage    Generalized weakness: perhaps related to afib and CHF.  No evidence of acute infection, CXR with effusions but no infiltrates, UA negative.  -PT/OT eval for appropriateness to return to [facility] (assisted living)    DVT Prophylaxis: Eliquis started    Code Status: No CPR- Do NOT Intubate    Disposition: Expected discharge in 1-2 days once improved.  PT/OT eval for appropriateness to return to [facility] (assisted living)    Kriss Rudolph MD        Interval History   Patient seen at bedside.  Somewhat confused and restless but redirectable.  No acute complaints, states he feels at his baseline now.    -Data reviewed today: I reviewed all new labs and imaging results over the last 24 hours. I personally reviewed imaging reports.    Physical Exam   Temp: 96.9  F (36.1  C) Temp src: Tympanic BP: 138/94 Pulse: 90   Resp: 24 SpO2: 94 % O2 Device: None (Room air)    Vitals:    03/23/21 0255   Weight: 117.6 kg (259 lb 4.2 oz)     Vital Signs with Ranges  Temp:   [96.4  F (35.8  C)-97.7  F (36.5  C)] 96.9  F (36.1  C)  Pulse:  [] 90  Resp:  [10-44] 24  BP: (125-171)/() 138/94  SpO2:  [80 %-98 %] 94 %    Intake/Output Summary (Last 24 hours) at 3/23/2021 0857  Last data filed at 3/23/2021 0800  Gross per 24 hour   Intake 310 ml   Output --   Net 310 ml       Peripheral IV 03/22/21 Right Upper forearm (Active)   Site Assessment WDL except;Leaking 03/23/21 0021   Line Status Saline locked 03/23/21 0021   Dressing Intervention New dressing  03/22/21 1326   Phlebitis Scale 0-->no symptoms 03/23/21 0021   Infiltration Scale 0 03/23/21 0021   Infiltration Site Treatment Method  None 03/23/21 0021   If infiltrated, was a Vesicant infusing? No 03/22/21 1326   Number of days: 1     Line/device assessment(s) completed for medical necessity    Constitutional - awake, restless but redirectable  HEENT - atraumatic, normocephalic  Neck - supple, no masses, no JVD  CVS - S1 S2 irregular, no murmurs, rubs, gallops  Respiratory - CTA b/l  GI - soft, NT, ND, + bowel sounds, no organomegaly  Musculoskeletal - no LE edema, no lesions  Neuro - oriented x 2, no gross focal deficits    Medications     - MEDICATION INSTRUCTIONS -         apixaban ANTICOAGULANT  5 mg Oral BID     dextrose  50 mL Intravenous Once     famotidine  20 mg Oral BID     furosemide  40 mg Intravenous Daily     insulin aspart  1-7 Units Subcutaneous TID AC     insulin aspart  1-5 Units Subcutaneous At Bedtime     latanoprost  1 drop Both Eyes At Bedtime     lisinopril  10 mg Oral Daily     metoprolol succinate ER  25 mg Oral Daily     risperiDONE  0.5 mg Oral BID     sodium chloride (PF)  3 mL Intracatheter Q8H     tamsulosin  0.4 mg Oral At Bedtime     timolol maleate  1 drop Both Eyes BID       Data   Recent Labs   Lab 03/23/21  0518 03/22/21  1711 03/22/21  1405   WBC 8.0  --  7.8   HGB 12.8*  --  13.9   MCV 93  --  95     --  204     --  143   POTASSIUM 3.6  --  4.3   CHLORIDE 111*  --  113*   CO2  29  --  23   BUN 23  --  26   CR 1.18  --  1.13   ANIONGAP 4  --  7   DANIELLE 9.5  --  9.9   GLC 54*  --  115*   ALBUMIN  --   --  3.5   PROTTOTAL  --   --  7.4   BILITOTAL  --   --  0.4   ALKPHOS  --   --  82   ALT  --   --  62   AST  --   --  37   TROPI 0.047* 0.031 0.032     Lactic Acid   Date Value Ref Range Status   03/22/2021 3.3 (H) 0.7 - 2.0 mmol/L Final     Comment:     Significant value called to and read back by  Kanika Motley at 2205 by      03/22/2021 2.8 (H) 0.7 - 2.0 mmol/L Final     Comment:     Significant value called to and read back by  Angela Miller at 1413 by      09/11/2020 2.6 (H) 0.7 - 2.0 mmol/L Final     Comment:     Significant value called to and read back by  SIERRA WILLARD AT 2309 ON 9/11/20 BY GILBERT         Recent Results (from the past 24 hour(s))   XR Chest Port 1 View    Narrative    PROCEDURE: XR CHEST PORT 1 VW 3/22/2021 2:20 PM    HISTORY: lethargy    COMPARISONS: 9/11/2020.    TECHNIQUE: Single view.    FINDINGS: Heart is enlarged with elongation of the thoracic aorta,  similar to the prior exam.    There is questionable patchy atelectasis or infiltrate medially at the  right lung base. No pleural effusion is seen.    Severe degenerative change is seen in the left shoulder.         Impression    IMPRESSION: Question patchy atelectasis or infiltrate medial right  lung base.    JEANNINE THAPA MD   CT Chest (PE) Abdomen Pelvis w Contrast    Narrative    CT CHEST PE ABDOMEN PELVIS W CONTRAST    CLINICAL HISTORY: Male, age 80 years, Shortness of breath;  TACHYCARDIA; ABDOMINAL PAIN; Sepsis;    Comparison:  None.    TECHNIQUE:  CT was performed of the chest, abdomen and pelvis  after  the administration of IV  contrast.   Sagittal, coronal, axial and MIP reconstructions were reviewed.     FINDINGS:  Chest CT:   CTA chest: Excellent opacification of the pulmonary arteries. No  evidence of pulmonary embolus.    After scattered calcifications of the coronary arteries and  thoracic  aorta.    Moderate size right and small left-sided pleural effusion. Areas of  dependent atelectasis in both lungs. No evidence of well-defined focal  consolidation. Mild patchy air trapping throughout both lungs.    Number of normal-sized lymph nodes throughout the aurelia and  mediastinum.    Esophagus is normal.    Abdomen/Pelvis CT:  Stomach and duodenum are normal.    Liver: Normal.    Gallbladder: Large radiodense and radiolucent gallstone.    Spleen: Normal    Pancreas: No acute abnormality. Moderate atrophy.     Adrenal glands: Normal    Kidneys: No acute abdomen. Numerous low dense lesions consistent with  cortical cysts.  Ureters: Normal.  Urinary bladder: Normal.    Large and small bowel: Moderate to large volume of stool in the colon.  No acute abnormality.    Appendix: Normal.    Bony structures: Severe degenerative changes within the right hip  joint. No acute abnormality. Degenerative changes of the lumbar spine.      Impression    IMPRESSION:   Good quality CTA demonstrates no evidence of pulmonary embolus.    Abdominal aorta is intact.    Moderate size right and small left-sided effusion with areas of  dependent atelectasis in both lungs. No evidence of well-defined  pneumonia.    Cholelithiasis.    Low dense renal lesions likely represent cortical cysts. Of  normal-appearing appendix.    MD Kriss GILLETTE MD

## 2021-03-23 NOTE — PLAN OF CARE
[facility] Inpatient Admission Note:    Patient admitted to 3110/3110-1 at approximately 2110 via cart accompanied by transport tech from emergency room . Report received from Er RN in SBAR format at 2250via telephone. Patient transferred to bed via slide board.. Patient is alert and oriented X 3, denies pain; rates at 0 on 0-10 scale.  Patient oriented to room, unit, hourly rounding, and plan of care. Explained admission packet and patient handbook with patient bill of rights brochure. Will continue to monitor and document as needed.     Inpatient Nursing criteria listed below was met:    Health care directives status obtained and documented: Yes    Care Everywhere authorization obtained No    MRSA swab completed for patient 65 years and older: No    Patient identifies a surrogate decision maker: No If yes, who: Contact Information:     If initial lactic acid >2.0, repeat lactic acid drawn within one hour of arrival to unit: Yes. If no, state reason: repeated was 3.3    Vaccination assessment and education completed: Yes   Vaccinations received prior to admission: Pneumovax yes  Influenza(seasonal)  YES   Vaccination(s) ordered: not given today because up to date    Clergy visit ordered if patient requests: N/A    Skin issues/needs documented: N/A    Isolation Patient: yes Education given, correct sign in place and documentation row added to PCS:  Yes    Fall Prevention Yes: Care plan updated, education given and documented, sticker and magnet in place: Yes    Care Plan initiated: Yes    Education Documented (including assessment): Yes    Patient has discharge needs : No If yes, please explain:

## 2021-03-23 NOTE — PROGRESS NOTES
Assessment completed by Sharla Holguin with Erinn (sister).    LOC: alert     Dx: Tachypnea  Chronic Disease Management: Mild MR, Diabetes Mellitus Type II, Dementia    Living at:  [facility]  Transportation: Not Applicable     Primary PCP: Pradeep Fuller  Insurance:  [employer] Medicare inpatient letter reviewed with Erinn.    Support System:  Yes  Homecare/PCA: No  /[facility] Services:   Jenny Jacobson [phone]  : NO     Health Care Directive: Yes. Sister has at home    Adequate Resources for needs (housing, utilities, food/med): YES  Household chores: N/A  Work/community/social activity: YES     ADLs: At baseline requires assistance bathing and dressing  Ambulation:At baseline utilizes a walker independently  Falls: Sept. 2020 was last fall  Nutrition: Adequate  Sleep: Adequate    Equipment used: Walker, orthotics, and therapeutic shoes for persons with diabetes. Will need wheelchair upon discharge       Mental health: Mild MR. No anxiety or depression noted  Substance abuse: No  Exposure to violence/abuse: No  Stressors: None noted    Able to Return to Prior Living Arrangements: NO    Choice of Vendor: family was provided with the Medicare Compare list for Memory Care Facilities.  Discussed associated Medicare star ratings to assist with choice for referrals/discharge planning Yes    Education was given to family that star ratings are updated/maintained by Medicare and can be reviewed by visiting www.medicare.gov Yes    family choices for facility in priority are:   First Choice : [facility]    [facility]: low    Plan: Depending upon echo result. Discharge to a memory care unit that allows wheelchairs.  Jenny Jacobson has already been in contact with Bridget Grullon.

## 2021-03-23 NOTE — PLAN OF CARE
Prior to Admission Medication Reconciliation:     Medications added:   [x] None  [] As listed below:    Medications deleted:   [x] None  [] As listed below:    Changes made to existing medications:   [] None  [x] As listed below:    Glipizide from IR to ER    Metformin from IR to ER    Last times/dates taken verified with patient:  [] Yes- completed myself  [x] Nurse completed no changes made  [] Unable to review with patient:  [] Nurse completed/changes made:     Allergy modifications:    []Did not review  [x]MAR listed NKA  []Patient verified current existing allergies: no changes made  []New allergies: listed below        Medication reconciliation sources:   []Patient  []Patient family member/emergency contact: **  [x][facility] Report Review:  Home Medications     - Last Reconciled 02/25/21 by Maria L Ramirez CMA    acetaminophen 325 mg tablet 325 mg PO Q4H PRN   alcohol swabs (Easy Touch Alcohol Prep Pads) USE AS DIRECTED   blood sugar diagnostic (Glucocard Vital Test Strips) 1 strip MISC BID   blood-glucose meter (Glucocard Vital) As directed   cimetidine 400 mg [id number] mg PO BID   glipizide 5 mg tablet, extended release 24 hr TAKE 1 TABLET BY MOUTH ONCE DAILY.   insulin aspart U-100 100 unit/mL (3 mL) subcutaneous pen (Novolog Flexpen U-100 Insulin aspart) 1 unit (0.01 mL) SUBCUT .PER SLIDING SCALE   insulin glargine 100 unit/mL (3 mL) subcutaneous pen (Lantus Solostar U-100 Insulin) INJECT 46 UNITS SQ DAILY AS DIRECTED.   lancets 28 gauge (Assure Francisco) TEST TWICE DAILY   latanoprost 0.005 % eye drops 1 drp Both Eyes QPM   lisinopril 20 mg tablet 20 mg PO DAILY   loratadine 10 mg tablet 10 mg PO DAILY   metformin 500 mg tablet,extended release 24 hr TAKE 2 TABLETS BY MOUTH TWICE DAILY WITH FOOD   metoprolol succinate 50 mg tablet,extended release 24 hr TAKE 1 TABLET BY MOUTH ONCE DAILY.   miscellaneous medical supply  lancet pen, use daily to check BS   mupirocin 2 % topical ointment APPLY TO RIGHT MIDDLE  "FINGER TWICE DAILY UNTIL HEALED   nystatin-triamcinolone 100,000 unit/g-0.1 % topical cream  1 applic topical BID PRN   pen needle, diabetic 29 gauge x 1/2\" (UltiCare Pen Needle) 1 ea MISC DAILY   risperidone 0.25 mg tablet 0.25 mg PO BID   simvastatin 40 mg tablet 40 mg PO BEDTIME   sulfamethoxazole 800 mg-trimethoprim 160 mg tablet (Bactrim DS) 1 tab PO BID   tamsulosin 0.4 mg capsule 0.4 mg PO DAILY   timolol maleate 0.5 % eye drops 1 drp Both Eyes BID     []Epic Chart Review  []Care Everywhere review  [x][facility] [address] MAR   Name Strength Instructions Date TIME Notes   [x] Cerovite SR  1 tab daily  3/22/21 0734    [x] Cimetidine  400 mg 1 tab BID 3/22/21 0734    [x] Glipizide  5 mg  daily 3/22/21 0730    [x] metformin 1000 mg BID 3/22/21 0734    [x] lantus 100 units/ml 46 units **Allie will self administer 46 units to himself after priming needle with 2 units subcutaneous once daily 3/22/21 0734    [x] latanoprost 0.05% 1 gtt both eyes at bedtime wait 3-5 mins before giving next eye drops 3/19/21 2043    [x] Lisinopril  20 mg Daily  3/22/21 0734    [x] loratadine 10 mf  Daily  3/22/21 0734    [x] Metoprolol  50 mg daily 3/22/21 0734    [x] novolog flexpen  Sliding scale 3x day: < 150=0 units. 151-200=3 units, 201-250=5 units, 251-300=7 units, 301-350=9 units, 351-400 =11 units, higher than 400 call the nurse 3/22/21 1213    [x] risperidone 0.25 mg +0.5 mg 0.75 mg BID 3/22/21 0735    [x] simvastatin 40 mg Daily at 1700 3/21/21 1422    [x] tamsulosin 0.4 mg daily 3/22/21 0734    [x] Timolol maleate  1 gtt both eyes bid  3/22/21 0734    [x] Triamcinolone ointment 0.1%  aaa bid 3/22/21 0734    [] apap  325 mg 2 tabs q4h prn       [x] mycolog  Apply to groin area BID prn         []Pharmacy phone call  []Outside meds dispense report  []Nursing home or Assisted Living MAR:  []Other: **    Pharmacy desired at discharge: [facility]     Is patient on coumadin?  [x]No      Requests for consultation by provider or pharmacist: "   [] Patient understands why all of their meds were prescribed and how to take them. No questions.   [x] Fill dates coincide with compliancy for all maintenance meds.   [] Fill dates do not coincide with compliancy with maintenance meds. See notes in PTA medlist about how patient is taking.   [] Patient has questions about the following:    Comments: meds managed by nursing staff at [facility].     Roxana Munoz on 3/23/2021 at 10:15 AM       Discrepancies: [] No []Not Applicable [x]Yes: listed below    MAR sent to me had medications incorrectly input that could have resulted in a med error but luckily were not ordered or were input into PTA meds correctly already. Metformin, glipizide and  metoprolol    Time spent on medication reconciliation:   []5-20 mins  []20-40 mins  [x]> 40 mins    Issues completing PTA medication reconciliation:  [] On hold for a long time  [] Waited for a call back  [] Fax didn't come through  [x] Fax took a long time  [] Other:    Notifying appropriate party of changes/additions/discrepancies:  []No pertinent changes made, notification not necessary.   [x] Notified attending provider via text page  [] Notified attending provider in person  [x] Notified pharmacy of differences noticed  [] Notified nurse  [] Attending provider not available, left detailed notes  [] Changes/additions made don't need provider notification because provider has not seen patient or input any orders  [] Changes/additions made don't need provider notification because changes made are to medications not ordered    Medications Prior to Admission   Medication Sig Dispense Refill Last Dose     cimetidine (TAGAMET) 400 MG tablet Take 400 mg by mouth 2 times daily   3/22/2021 at 0800     glipiZIDE (GLUCOTROL XL) 5 MG 24 hr tablet Take 5 mg by mouth daily    3/22/2021 at 0735     insulin glargine (LANTUS SOLOSTAR) 100 UNIT/ML pen Inject 46 Units Subcutaneous every morning    3/21/2021 at 2100     latanoprost (XALATAN)  0.005 % ophthalmic solution Place 1 drop into both eyes At Bedtime .Wait 3-5 minutes before administering other eye drops.   3/21/2021 at 2200     lisinopril (ZESTRIL) 20 MG tablet Take 20 mg by mouth daily    3/22/2021 at 0800     loratadine (CLARITIN) 10 MG tablet Take 10 mg by mouth daily    3/22/2021 at 0800     metFORMIN (GLUCOPHAGE-XR) 500 MG 24 hr tablet Take 1,000 mg by mouth 2 times daily (with meals)    3/22/2021 at Unknown time     metoprolol succinate ER (TOPROL-XL) 50 MG 24 hr tablet Take 50 mg by mouth daily   3/22/2021 at 0800     multivitamin (CENTRUM SILVER) tablet Take 1 tablet by mouth daily 90 tablet 3 3/22/2021 at 0800     nystatin-triamcinolone (MYCOLOG) 925828-2.1 UNIT/GM-% external ointment Apply topically 2 times daily prn   3/22/2021 at Unknown time     risperiDONE (RISPERDAL) 0.25 MG tablet Take 0.25 mg by mouth daily along with a 0.5 mg tablet for a total daily dose of 0.75 mg.   3/22/2021 at 0730     risperiDONE (RISPERDAL) 0.5 MG tablet Take 0.5 mg by mouth daily along with a 0.25 mg tablet for a total daily dose of 0.75 mg.   3/22/2021 at 0730     tamsulosin (FLOMAX) 0.4 MG capsule Take 0.4 mg by mouth daily   3/22/2021 at 0800     timolol maleate (TIMOPTIC) 0.5 % ophthalmic solution Place 1 drop into both eyes 2 times daily . Wait 3-5 minutes before administering other eye drops.   3/22/2021 at 0800     triamcinolone (KENALOG) 0.1 % external ointment Apply bid to rash 454 g 3 3/22/2021 at 0800     acetaminophen (TYLENOL) 325 MG tablet Take 325 mg by mouth every 4 hours as needed for mild pain   Unknown at Unknown time     Assure Francisco Lancets MISC Use to test blood sugar twice daily   Unknown at Unknown time     GLUCOCARD VITAL TEST test strip 1 strip by In Vitro route 2 times daily    Unknown at Unknown time     NOVOLOG FLEXPEN 100 UNIT/ML soln Inject 0-11 Units Subcutaneous 3 times daily per sliding scale. < 150=0 units.   151-200=3 units,   201-250=5 units,   251-300=7 units,    301-350=9 units,   351-400 =11 units,   higher than 400 call the nurse   Unknown at Unknown time     simvastatin (ZOCOR) 40 MG tablet Take 40 mg by mouth At Bedtime    3/21/2021 at 1600     ULTICARE 29G X 12.7MM insulin pen needle Use daily as directed.   Unknown at Unknown time         Medication Dispense History (from 3/23/2020 to 3/23/2021)  Expand All  Collapse All  Alcohol Swabs   Dispensed Days Supply Quantity Provider Pharmacy    ALCOHOL 70% [facility]P [facility]ADS [facility]   SM ALCOHOL 70% PREP PADS 01/13/2021 30 100 [facility] ...   EASY TOUCH ALCOHOL 70% PADS 12/11/2020 30 100 [facility] ...   EASY TOUCH ALCOHOL 70% PADS 11/10/2020 30 100 [facility] ...   EASY TOUCH ALCOHOL 70% PADS 08/11/2020 30 [facility]   EASY TOUCH ALCOHOL 70% PADS 06/05/2020 30 100 [facility] ...         Amoxicillin-Pot Clavulanate   Dispensed Days Supply Quantity Provider Pharmacy   AMOX-CLAV 875-125 MG TABLET 02/25/2021 10 20 tablet [facility] ...   AMOX/CLAV 1000MG-62.5MG ER TAB 02/25/2021 7 28 Units [facility]...         Azithromycin   Dispensed Days Supply Quantity Provider Pharmacy   AZITHROMYCIN 250 MG TABLET 02/25/2021 5 6 tablet [facility] ...   AZITHROMYCIN 250MG TABLET 02/25/2021 5 6 Units [facility]...         Cimetidine   Dispensed Days Supply Quantity Provider Pharmacy   CIMETIDINE 400 MG TABLET 03/08/2021 29 58 tablet [facility] ...   CIMETIDINE 400 MG TABLET 02/08/2021 29 58 tablet [facility] ...   CIMETIDINE 400 MG TABLET 01/14/2021 29 58 tablet [facility] ...   CIMETIDINE 400 MG TABLET 12/14/2020 29 58 tablet GRACIE CORADO [facility] ...   CIMETIDINE 400 MG TABLET  11/17/2020 29 58 tablet ROSEGRACIE [facility] [place]   CIMETIDINE 400 MG TABLET 10/22/2020 [facility]   CIMETIDINE 400 MG TABLET 10/07/2020 19 38 tablet JOLIESOPHY [facility] ...         Doxycycline Hyclate   Dispensed Days Supply Quantity Provider Pharmacy   DOXYCYCLINE HYCLATE 100 MG CAP 10/27/2020 7 14 capsule OZZIE RUSSELL [facility] #...         Fluconazole   Dispensed Days Supply Quantity Provider Pharmacy   FLUCONAZOLE 150MG TABLETS 10/15/2020 28 4 Units PANIAQUACLARISSASHEREEN [facility] #...         Influenza Vac Split Quad   Dispensed Days Supply Quantity Provider Pharmacy   AFLURIA QUAD [id number] FOR INJ 10/21/2020 1 0.5 mL LUDMILA BOWMAN Pharmacy...         Insulin Aspart   Dispensed Days Supply Quantity Provider Pharmacy   INSULIN ASPART 100 UNIT/ML PEN 03/01/2021 45 15 each [facility] ...   NOVOLOG 100 UNIT/ML FLEXPEN 12/22/2020 45 15 [facility] ...   NOVOLOG 100 UNIT/ML FLEXPEN 10/22/2020 45 15 [facility] ...         Insulin Glargine   Dispensed Days Supply Quantity Provider Pharmacy   LANTUS SOLOSTAR 100 UNIT/ML 03/16/2021 30 15 [facility] ...   LANTUS SOLOSTAR 100 UNIT/ML 02/15/2021 30 15 each [facility] ...   LANTUS SOLOSTAR 100 UNIT/ML 12/22/2020 30 15 [facility] ...   LANTUS SOLOSTAR 100 UNIT/ML 11/27/2020 30 15 [facility] ...   LANTUS SOLOSTAR 100 UNIT/ML 10/09/2020 30 15 [facility] ...   LANTUS SOLOSTAR 100 UNIT/ML 09/11/2020 30 15 [facility] ...   LANTUS SOLOSTAR 100 UNIT/ML 07/27/2020 30 15 each [facility] ...   LANTUS SOLOSTAR 100 UNIT/ML 05/18/2020 30 15 each [facility] ...   LANTUS SOLOSTAR 100 UNIT/ML 04/24/2020 30 15 each  [facility] [place]         Insulin Pen Needle   Dispensed Days Supply Quantity Provider Pharmacy   ULTICARE PEN NEEDLES 12MM 29G 03/16/2021 25 100 each [facility] [place]   ULTICARE PEN NEEDLES 12MM 29G 02/15/2021 25 100 each GRACIE CORADO [facility] [place]   ULTICARE PEN NEEDLES 12MM 29G 01/13/2021 25 100 each [facility] [place]   ULTICARE PEN NEEDLES 12MM 29G 12/16/2020 25 100 each [facility]IE [facility] [place]   ULTICARE PEN NEEDLES 12MM 29G 11/20/2020 25 100 each GRACIE CORADO [facility] ...   ULTICARE PEN NEEDLES 12MM 29G 10/26/2020 90 100 each GRACIE CORADO [facility] [place]   ULTICARE PEN NEEDLES 12MM 29G 10/26/2020 25 100 each [facility] [place]   ULTICARE PEN NEEDLES 12MM 29G 08/18/2020 90 100 each [facility] [place]   ULTICARE PEN NEEDLES 12MM 29G 06/12/2020 90 100 each YAZAN TURCIOS [facility] [place]   BD AUTOSHIELD DUO NDL [id number] 03/26/2020 30 100 each GRACIE CORADO [facility] ...         Lancets   Dispensed Days Supply Quantity Provider Pharmacy   ASSURE GLEN 28G LANCETS 03/01/2021 30 100 each [facility] [place]   ASSURE GLEN 28G LANCETS 02/03/2021 30 100 each [facility]IE [facility] [place]   ASSURE GLEN 28G LANCETS 01/05/2021 30 100 [facility] [place]   ASSURE GLEN 28G LANCETS 12/01/2020 30 100 each [facility] [place]   ASSURE GLEN 28G LANCETS 11/09/2020 30 100 each [facility]ACIE [facility] [place]   ASSURE GLEN 28G LANCETS 09/30/2020 30 100 each LOO,GRACIE [facility] [place]   ASSURE GLEN 28G LANCETS 08/11/2020 30 100 each LOO[facility] [place]   ASSURE GLEN 28G LANCETS 07/13/2020 30 100 each LOO,[facility] [place]   ASSURE GLEN 28G LANCETS 06/18/2020 30 100 each [facility] [place]   ASSURE GLEN 28G LANCETS 03/31/2020 30 100 each  [facility] ...         Latanoprost   Dispensed Days Supply Quantity Provider Pharmacy   LATANOPROST 0.005% EYE DROPS 09/23/2020 30 2 each [facility] ...   LATANOPROST 0.005% EYE DROPS 08/31/2020 30 2 each [facility] ...   LATANOPROST 0.005% EYE DROPS 06/11/2020 30 2 each [facility] ...   LATANOPROST 0.005% EYE DROPS 05/06/2020 30 2 each [facility] ...   LATANOPROST 0.005% EYE DROPS 04/15/2020 30 2 each [facility] ...         Lisinopril   Dispensed Days Supply Quantity Provider Pharmacy   LISINOPRIL 20 MG TABLET 03/08/2021 29 29 tablet [facility] ...   LISINOPRIL 20 MG TABLET 02/08/2021 29 29 tablet [facility] ...   LISINOPRIL 20 MG TABLET 01/11/2021 29 29 tablet [facility] ...   LISINOPRIL 20 MG TABLET 12/14/2020 29 29 tablet [facility] ...   LISINOPRIL 20 MG TABLET 11/17/2020 28 28 tablet [facility] ...   LISINOPRIL 20 MG TABLET 10/19/2020 28 28 tablet [facility] ...   LISINOPRIL 20 MG TABLET 09/21/2020 28 28 tablet [facility] ...   LISINOPRIL 20 MG TABLET 08/24/2020 28 28 tablet [facility] ...   LISINOPRIL 20 MG TABLET 07/27/2020 28 28 tablet [facility] ...   LISINOPRIL 20 MG TABLET 06/29/2020 28 28 tablet [facility] ...   LISINOPRIL 20 MG TABLET 06/01/2020 28 28 tablet [facility] ...   LISINOPRIL 20 MG TABLET 05/07/2020 28 28 tablet [facility] ...   LISINOPRIL 20 MG TABLET 03/27/2020 28 28 tablet [facility] ...         Loratadine   Dispensed Days Supply Quantity Provider Pharmacy   ALLERGY (LORATADINE) 10 MG TAB 03/08/2021 29 29 [facility] ...    LORATADINE 10 MG TABLET 02/08/2021 29 29 tablet [facility] ...   LORATADINE 10 MG TABLET 01/11/2021 29 29 tablet [facility] ...   LORATADINE 10 MG TABLET 12/15/2020 29 29 tablet [facility] ...   LORATADINE 10 MG TABLET 11/17/2020 28 28 tablet [facility] ...   LORATADINE 10 MG TABLET 10/19/2020 28 28 tablet [facility] ...   LORATADINE 10 MG TABLET 09/21/2020 28 28 tablet [facility] ...   LORATADINE 10 MG TABLET 08/24/2020 28 28 tablet [facility] ...   LORATADINE 10 MG TABLET 07/27/2020 28 28 tablet [facility] ...   LORATADINE 10 MG TABLET 06/29/2020 28 28 tablet [facility] ...   ALLERGY (LORATADINE) 10 MG TAB 06/01/2020 28 28 tablet [facility] ...   LORATADINE 10MG TABLET 05/26/2020 28 [facility]   ALLERGY (LORATADINE) 10 MG TAB 05/07/2020 28 28 tablet [facility] ...   ALLERGY (LORATADINE) 10 MG TAB 03/27/2020 28 28 tablet [facility] ...         Metoprolol Succinate   Dispensed Days Supply Quantity Provider Pharmacy   METOPROLOL SUCC ER 50 MG TAB 03/08/2021 29 29 tablet [facility] ...   METOPROLOL SUCC ER 50 MG TAB 02/08/2021 29 29 tablet [facility] ...   METOPROLOL SUCC ER 50 MG TAB 01/11/2021 29 29 tablet [facility] ...   METOPROLOL SUCC ER 50 MG TAB 12/14/2020 29 29 tablet [facility] ...   METOPROLOL SUCC ER 50 MG TAB 11/17/2020 29 29 tablet [facility]AD[facility] ...   METOPROLOL SUCC ER 50 MG TAB 10/19/2020 28 28 tablet [facility] ...   METOPROLOL SUCC ER 50 MG TAB 09/21/2020 28 28 tablet [facility] ...   METOPROLOL SUCC ER 50 MG TAB 08/24/2020 28 28 tablet [facility] [place]   METOPROLOL SUCC ER 50 MG TAB 07/27/2020 28 28 tablet [facility]   METOPROLOL SUCC ER 50 MG TAB 06/29/2020 28 28 tablet [facility]   METOPROLOL SUCC ER 50 MG TAB 06/01/2020 28 28 tablet [facility]   METOPROLOL SUCC ER 50 MG TAB 05/06/2020 28 28 tablet [facility]   METOPROLOL SUCC ER 50 MG TAB 03/27/2020 28 28 [facility]         Multiple Vitamins-Minerals   Dispensed Days Supply Quantity Provider Pharmacy   CERTAVITE SENIOR TABLET 03/08/2021 29 29 tablet SOPHY DAVE [facility] [place]   CERTAVITE SENIOR TABLET 02/08/2021 29 29 tablet SOPHY DAVE [facility] [place]   CERTAVITE SENIOR TABLET 01/14/2021 29 29 tablet SOPHY DAVE [facility] [place]   CERTAVITE SENIOR TABLET 12/14/2020 29 29 tablet SOPHY DAVE [facility] [place]   CERTAVITE SENIOR TABLET 11/17/2020 28 28 tablet JOLIESOPHY CINTRON [facility] [place]   CERTAVITE SR-ANTIOXIDANT TAB 10/22/2020 28 28 tablet SOPHY DAVE [facility] [place]   CERTAVITE SR-ANTIOXIDANT TAB 09/30/2020 27 27 tablet SOPHY DAVE [facility] ...         Mupirocin   Dispensed Days Supply Quantity Provider Pharmacy   MUPIROCIN 2% OINTMENT 11/16/2020 14 22 g OZZIE RUSSELL [facility] [place]   MUPIROCIN 2% OINTMENT 08/06/2020 10 22 g [facility]   MUPIROCIN 2% OINTMENT 07/29/2020 14 22 g AVERY TELLES [facility]   MUPIROCIN 2% TOPICAL OINTMENT 07/28/2020 10 22 g AVERY TELLES [facility]...         Nystatin-Triamcinolone   Dispensed Days Supply Quantity Provider Pharmacy   NYSTATIN-TRIAMCINOLONE CREAM 08/24/2020 30 60 each [facility] [place]         Simvastatin   Dispensed Days Supply Quantity Provider Pharmacy   SIMVASTATIN 40 MG TABLET 03/08/2021 29 29 tablet  [facility] [place]   SIMVASTATIN 40 MG TABLET 02/08/2021 29 29 tablet LOO,[facility] [place] ...   SIMVASTATIN 40 MG TABLET 01/11/2021 29 29 tablet LOO,[facility] [place] ...   SIMVASTATIN 40 MG TABLET 12/14/2020 29 29 tablet LO,[facility] [place] ...   SIMVASTATIN 40 MG TABLET 11/17/2020 28 28 tablet [facility] [place] ...   SIMVASTATIN 40 MG TABLET 10/19/2020 28 28 tablet LOO,[facility] [place] ...   SIMVASTATIN 40 MG TABLET 09/21/2020 28 28 tablet [facility] [place] ...   SIMVASTATIN 40 MG TABLET 08/24/2020 28 28 tablet [facility] [place] ...   SIMVASTATIN 40 MG TABLET 07/27/2020 28 28 tablet [facility] [place] ...   SIMVASTATIN 40 MG TABLET 06/29/2020 28 28 tablet [facility] [place] ...   SIMVASTATIN 40 MG TABLET 06/01/2020 28 28 tablet LO,[facility] [place] ...   SIMVASTATIN 40 MG TABLET 05/07/2020 28 28 tablet [facility] [place] ...   SIMVASTATIN 40 MG TABLET 03/27/2020 28 28 tablet [facility] [place] ...         Sulfamethoxazole-Trimethoprim   Dispensed Days Supply Quantity Provider Pharmacy   SULFAMETHOXAZOLE-TMP DS TABLET 11/02/2020 7 14 tablet OZZIE RUSSELL [facility]ARMAC[facility] [place] ...   SULFAMETHOXAZOLE-TMP DS TABLET 09/16/2020 10 [facility] [place] ...   SULFAMETHOXAZOLE-TMP DS TABLET 07/29/2020 10 20 tablet AVERY TELLES [facility]AC[facility] [place] ...   SMX/TMP 800MG-160MG TABLET 07/28/2020 10 20 Units AVERY TELLES [facility]...         Tamsulosin HCl   Dispensed Days Supply Quantity Provider Pharmacy   TAMSULOSIN HCL 0.4 MG CAPSULE 03/08/2021 29 29 capsule LOO,[facility] [place] ...   TAMSULOSIN HCL 0.4 MG CAPSULE 02/08/2021 29 29 capsule LOO,[facility] [place] ...   TAMSULOSIN HCL 0.4 MG CAPSULE 01/11/2021 29 29 capsule LOO,[facility] [place] ...   TAMSULOSIN HCL  0.4 MG CAPSULE 12/15/2020 29 29 capsule LOO,[facility] [place]   TAMSULOSIN HCL 0.4 MG CAPSULE 11/17/2020 28 28 capsule LOO,[facility] [place] ...   TAMSULOSIN HCL 0.4 MG CAPSULE 10/19/2020 28 28 capsule LOO,[facility] [place] ...   TAMSULOSIN HCL 0.4 MG CAPSULE 09/21/2020 28 28 capsule LOO,[facility] [place] ...   TAMSULOSIN HCL 0.4 MG CAPSULE 08/24/2020 28 28 capsule LOO,[facility] [place] ...   TAMSULOSIN HCL 0.4 MG CAPSULE 07/27/2020 28 28 capsule LOO,[facility] [place] ...   TAMSULOSIN HCL 0.4 MG CAPSULE 06/29/2020 28 28 capsule LOO,[facility] [place] ...   TAMSULOSIN HCL 0.4 MG CAPSULE 06/01/2020 28 28 capsule LOO,[facility] [place] ...   TAMSULOSIN HCL 0.4 MG CAPSULE 05/07/2020 28 28 capsule LOO,[facility] [place] ...   TAMSULOSIN HCL 0.4 MG CAPSULE 03/27/2020 28 28 capsule LOO,[facility] [place] ...         Timolol Maleate   Dispensed Days Supply Quantity Provider Pharmacy   TIMOLOL MALEATE 0.5% EYE DROPS 10/28/2020 60 15 each [facility] [place] ...   TIMOLOL MALEATE 0.5% EYE DROPS 08/19/2020 60 15 each [facility] [place] ...   TIMOLOL MALEATE 0.5% EYE DROPS 06/29/2020 30 15 [facility] [place] ...   TIMOLOL MALEATE 0.5% EYE DROPS 05/19/2020 30 15 each [facility] [place] ...         Triamcinolone Acetonide   Dispensed Days Supply Quantity Provider Pharmacy   TRIAMCINOLONE 0.1% OINTMENT 11/02/2020 30 454 g CHAVA MAZARIEGOS'S [facility]ARMAC[facility] [place] ...   TRIAMCINOLONE 0.1% CREAM 10/07/2020 30 85 g SOPHY DAVE'S [facility]ARMAC[facility] [place] ...         glipiZIDE   Dispensed Days Supply Quantity Provider Pharmacy   GLIPIZIDE ER 5 MG TABLET 03/08/2021 29 29 tablet [facility]AD[facility] [place] ...   GLIPIZIDE ER 5 MG TABLET 02/08/2021 29 29 tablet [facility] [place] ...   GLIPIZIDE ER 5 MG TABLET 01/11/2021 29 29 tablet GRACIE CORADO  [facility] [place] [place] ...   GLIPIZIDE ER 5 MG TABLET 12/14/2020 29 29 tablet LOO,[facility]ARMACY [facility] [place] [place] ...   GLIPIZIDE ER 5 MG TABLET 11/17/2020 29 29 tablet LOO,[facility]AC[facility] [place] [place] ...   GLIPIZIDE ER 5 MG TABLET 10/19/2020 28 28 tablet [facility] [place] [place] ...   GLIPIZIDE ER 5 MG TABLET 09/21/2020 28 28 tablet LOO,[facility]ARMACY [facility] [place] [place] ...   GLIPIZIDE ER 5 MG TABLET 08/24/2020 28 28 tablet LOO,[facility]ARMACY [facility] [place] [place] ...   GLIPIZIDE ER 5 MG TABLET 07/27/2020 28 28 tablet LO,[facility] PHARMACY [facility] [place] [place] ...   GLIPIZIDE ER 5 MG TABLET 06/29/2020 28 28 tablet [facility]ACY [facility] [place] [place] ...   GLIPIZIDE ER 5 MG TABLET 06/01/2020 28 28 tablet LO,[facility] PHARMACY [facility] [place] [place] ...   GLIPIZIDE ER 5 MG TABLET 05/06/2020 28 28 tablet LOO,[facility] PHARMACY [facility] [place] [place] ...   GLIPIZIDE ER 5 MG TABLET 03/27/2020 28 28 tablet [facility]ACY [facility] [place] [place] ...         metFORMIN HCl   Dispensed Days Supply [facility] Provider Pharmacy   METFORMIN HCL  MG TABLET 03/08/2021 29 116 tablet [facility] PH[facility]ACY [facility] [place] [place] ...   METFORMIN HCL  MG TABLET 02/08/2021 29 116 tablet [facility] PH[facility]ACY [facility] [place] [place] ...   METFORMIN HCL  MG TABLET 01/11/2021 29 116 tablet LO,[facility] PHARMACY [facility] [place] [place] ...   METFORMIN HCL  MG TABLET 12/14/2020 29 116 tablet [facility] PH[facility]ACY [facility] [place] [place] ...   METFORMIN HCL  MG TABLET 11/17/2020 29 116 tablet [facility] PH[facility]ACY [facility] [place] [place] ...   METFORMIN HCL  MG TABLET 10/19/2020 28 112 tablet LO,[facility] PHARMACY [facility] [place] [place] ...   METFORMIN HCL  MG TABLET 09/21/2020 28 112 tablet [facility] PHARMACY [facility] [place] [place] ...   METFORMIN HCL  MG TABLET 08/24/2020 28 112 tablet ROSE,[facility] PHARMACY [facility] [place] [place] ...   METFORMIN HCL  MG TABLET 07/27/2020 28 112 tablet [facility] PHARMACY [facility] [place] [place] ...   METFORMIN HCL  MG TABLET 06/29/2020 28 112 tablet ROSE,[facility] PHARMACY [facility] [place] [place] ...   METFORMIN HCL ER  500 MG TABLET 06/01/2020 28 112 tablet [facility] ...   METFORMIN HCL  MG TABLET 05/06/2020 28 112 tablet [facility] ...   METFORMIN HCL  MG TABLET 03/30/2020 28 112 tablet [facility] [place]         predniSONE   Dispensed Days Supply Quantity Provider Pharmacy   PREDNISONE 20 MG TABLET 02/25/2021 5 10 [facility] ...   PREDNISONE 20MG TABLET 02/25/2021 5 10 [facility]         risperiDONE   Dispensed Days Supply Quantity Provider Pharmacy   RISPERIDONE 0.25 MG TABLET 03/08/2021 29 58 [facility] [place]   RISPERIDONE 0.5 MG TABLET 03/08/2021 29 58 tablet [facility] [place]   RISPERIDONE 0.25 MG TABLET 02/08/2021 29 58 [facility] [place]   RISPERIDONE 0.5 MG TABLET 02/08/2021 29 58 tablet [facility] [place]   RISPERIDONE 0.25 MG TABLET 01/12/2021 29 58 [facility] [place]   RISPERIDONE 0.5 MG TABLET 01/11/2021 29 58 tablet [facility] [place]   RISPERIDONE 0.25 MG TABLET 12/17/2020 29 58 [facility] [place]   RISPERIDONE 0.5 MG TABLET 12/15/2020 29 58 tablet [facility] [place]   RISPERIDONE 0.25 MG TABLET 12/01/2020 21 42 tablet [facility] [place]   RISPERIDONE 0.5 MG TABLET 11/17/2020 28 56 tablet [facility] [place]   RISPERIDONE 0.5 MG TABLET 10/19/2020 28 56 tablet [facility] [place]   RISPERIDONE 0.5 MG TABLET 09/21/2020 28 56 tablet [facility] [place]   RISPERIDONE 0.5 MG TABLET 08/24/2020 28 56 tablet LOO,[facility] [place]   RISPERIDONE 0.5 MG TABLET 07/27/2020 28 56 tablet LOO,[facility] [place]   RISPERIDONE 0.5 MG TABLET 06/29/2020 28 56 tablet LOO,[facility] [place]   RISPERIDONE 0.5 MG TABLET 06/01/2020 28 56 tablet [facility] [place]   RISPERIDONE 0.5 MG TABLET 05/07/2020 28 56 tablet LOO,[facility] [place]   RISPERIDONE 0.5 MG TABLET 03/27/2020 28 56 tablet [facility] [place]

## 2021-03-23 NOTE — PROGRESS NOTES
Inpatient Physical Therapy Evaluation    Name: Florentin Reyes MRN# [id number]   Age: 80 year old YOB: 1940     Date of Consultation: 2021  Consultation is requested by:  Dr. Bermudez  Specific Consultation Request:  Assess needs  Primary care provider: Pradeep Fuller    General Information:   Onset Date: 3/22/21    History of Current Problem/Admission: Tachypnea [R06.82]  Confusion [R41.0]    Prior Level of Function: Pt is a resident of [facility]. Reports he ambulates in the facility mod I using a walker  Ambulation: 1 - Assistive Equipment   Transferrin - Assistive Equipment   Toiletin - Not assessed   Bathing: 3 - Assistive Equipment & Person  Dressin - Not assessed  Eatin - Independent   Communication: 0 - Understands/communicates without difficulty  Swallowin - swallows foods/liquids without difficulty  Cognition: 1 - attention or memory deficits    Fall history within the last 6 months: Yes, reports 1 fall in the elevator of [facility]    Current Living Situation: Patient resides at [facility]    Current Equipment Used at Home: FWW    Patient & Family Goals: Did not report     Past Medical History:   No past medical history on file.    Past Surgical History:  Past Surgical History:   Procedure Laterality Date     COLONOSCOPY N/A 2018    Procedure: COLONOSCOPY;  COLONOSCOPY WITH POLYPECTOMY;  Surgeon: Roldan Vila MD;  Location: [facility] OR     COLONOSCOPY - HIM SCAN  2012    Colonoscopy, [facility] yr 2012       Medications:   Current Facility-Administered Medications   Medication     acetaminophen (TYLENOL) tablet 650 mg     apixaban ANTICOAGULANT (ELIQUIS) tablet 5 mg     calcium carbonate (TUMS) chewable tablet 1,000 mg     glucose gel 15-30 g    Or     dextrose 50 % injection 25-50 mL    Or     glucagon injection 1 mg     famotidine (PEPCID) tablet 20 mg     insulin aspart (NovoLOG) injection (RAPID ACTING)     insulin aspart (NovoLOG) injection  (RAPID ACTING)     latanoprost (XALATAN) 0.005 % ophthalmic solution 1 drop     lidocaine (LMX4) kit     lidocaine 1 % 0.1-1 mL     lisinopril (ZESTRIL) tablet 10 mg     melatonin tablet 3 mg     [START ON 3/24/2021] metoprolol succinate ER (TOPROL-XL) 24 hr tablet 50 mg     ondansetron (ZOFRAN-ODT) ODT tab 4 mg    Or     ondansetron (ZOFRAN) injection 4 mg     Patient is already receiving anticoagulation with heparin, enoxaparin (LOVENOX), warfarin (COUMADIN)  or other anticoagulant medication     risperiDONE (risperDAL) tablet 0.75 mg     sodium chloride (PF) 0.9% PF flush 3 mL     sodium chloride (PF) 0.9% PF flush 3 mL     sodium chloride (PF) 0.9% PF flush 3 mL     tamsulosin (FLOMAX) capsule 0.4 mg     timolol maleate (TIMOPTIC) 0.25 % ophthalmic solution 1 drop       Weight Bearing Status: NA     Cognitive Status Examination:  Orientation: disoriented to time  Level of Consciousness: alert and confused  Follows Commands and Answers Questions: 75% of the time and unable to follow multi-step instructions  Personal Safety and Judgement: At Risk Behaviors Demonstrated and Impulsive  Memory: Short-term memory impaired  Comments: Impaired safety awareness demonstrated. Pt attempted to transfer himself impulsively from recliner during session    Pain:   Currently in pain? Did not c/o pain    Physical Therapy Evaluation:   Integumentary/Edema: NA  ROM: WFL  Strength: BLE weakness demonstrated during functional tasks  Bed Mobility: Independent with sit to supine  Transfers: Pt required min to mod A x2 for transfers using FWW. Needs lots of cueing for safety and sequencing   Gait: Pt ambulated 50' x 2 with min A using FWW. Required assistance to prevent loss of balance and also required close WC follow for safety. Pt ambulates with shuffled, crouched gait wearing bilateral AFOs. Decreased foot clearance demonstrated bilaterally   Stairs: Unable  Balance: Poor standing balance, good sitting balance  Sensory:  NT  Coordination: Fair     Physical Therapy Interventions: Balance, Bed Mobility, Gait Training , Strengthening and Transfer Training    Clinical Impressions:  Criteria for Skilled Therapeutic Intervention Met: Yes, treatment indicated  PT Diagnosis: Weakness  Influenced by the following impairments: LE weakness, decreased activity tolerance, cognitive impairments c decreased insight to impairments and impulsivity, impaired gait, impaired balance   Functional limitations due to impairment: Decreased tolerance for functional mobility tasks due to impairments listed above, high fall risk   Clinical presentation: Stable/Uncomplicated  Clinical presentation rationale: Clinical judgement  Clinical Decision making (complexity): Low Complexity  Frequency: 6 times/week  Predicted Duration of Therapy Intervention (days/wks): 5 days    Anticipated Discharge Disposition: Transitional Care Facility  and Long Term Care Facility   Risks and Benefits of therapy have been explained: Yes  Patient, Family & other staff in agreement with plan of care: Yes  Clinical Impression Comments: PT evaluation completed to assess needs. Based on pt's current level of function he would require 24hr supervision/cares and would not be safe to return to [facility]. Currently would recommend discharge to SNF and pt possibly will require more assistance in the long term than he is receiving at his current facility. Plan to treat pt during his stay and monitor progress     Total Eval Time: 20

## 2021-03-23 NOTE — UTILIZATION REVIEW
Admission Status; Secondary Review Determination    Under the authority of the Utilization Management Committee, the utilization review process indicated a secondary review on the above patient. The review outcome is based on review of the medical records, discussions with staff, and applying clinical experience noted on the date of the review.    (x) Inpatient Status Appropriate - This patient's medical care is consistent with medical management for inpatient care and reasonable inpatient medical practice.    RATIONALE FOR DETERMINATION:80-year-old male who presents with significant increased weakness, lethargy as well as some shortness of breath and found to have tachypnea, audible wheeze, atrial fibrillation with rapid ventricular response with imaging consistent with heart failure.  Patient needs diuresing, and adequate rate control.  With heart rate every recurrent tachycardia episodes greater than 110 on the afternoon of hospital day 2, patient is not safe for discharge requiring better rate control prior to safe discharge to avoid recurrent heart failure    At the time of admission with the information available to the attending physician more than 2 nights Hospital complex care was anticipated, based on patient risk of adverse outcome if treated as outpatient and complex care required. Inpatient admission is appropriate based on the Medicare guidelines.    This document was produced using voice recognition software    The information on this document is developed by the utilization review team in order for the business office to ensure compliance. This only denotes the appropriateness of proper admission status and does not reflect the quality of care rendered.    The definitions of Inpatient Status and Observation Status used in making the determination above are those provided in the CMS Coverage Manual, Chapter 1 and Chapter 6, section 70.4.    Sincerely,    Kemal Moise MD  Utilization  Review  Physician Advisor  [facility].

## 2021-03-23 NOTE — ED NOTES
Contacted lab regarding COVID swab and they reported that they are running short and were told to not run it at this time as pt is asymptomatic

## 2021-03-23 NOTE — ED NOTES
Client was incontinent of urine and attempting to get out of the bed. Provider wanted us to attempt to ambulate the client, assisted client to standing at the side of the bed and he was unable to stand for longer than 30 sec., work of breathing greatly increased when standing. Client was cleaned up and the bed was changed. Client was assisted back into the bed. Initial blood pressure after getting into the bed was low, repeat was high. Client is alert and responds appropriately to questions. He reports that he will not attempt to get out of bed without calling again. Call light in hand. Dr Bermudez is at the bedside to evaluate for admission.

## 2021-03-23 NOTE — H&P
[facility]    History and Physical - Hospitalist Service       Date of Admission:  3/22/2021    Assessment & Plan   Florentin Reyes is a 80 year old male admitted on 3/22/2021.     New onset AFIB with controlled rate: continue with metoprolol, telemetry monitoring, start Eliquis, check TSH    Associated acute CHF: empiric Lasix, dose-reduce Metoprolol, ECHO to assist with characterization. 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  Entered: Liam Bermudez DO 03/22/2021, 8:53 PM     The patient's care was discussed with the Primary team and [facility] staff  Liam Bermudez DO  [facility]  Contact information available via [facility] Paging/Directory      ______________________________________________________________________    Chief Complaint   Weakness, behavior changes, more short of breath    History is obtained from the patient's [facility] staff (the patient has rapidly progressing dementia)    History of Present Illness   Florentin Reyes is a 80 year old male who is a SNF resident with dementia with behaviors and diabetes; he generally has been doing fairly well.    Over the last 2-3 days, he has become weak, which compromised his ability to ambulate. He lost interest in eating and his behaviors included new angriness and a short temper    ER Course: vitals showed tachypnea and HTN (149/105). EKG showed AFIB, new since his last EGG 9/2020. He was very short of breath with minor activity. The lab diagnostics resulted unremarkable heme and chemistry profiles; UA was negative for  "infection. BNP was elevated (2195) and the troponin sequence X 2 was normal, as was TSH. Chest film showed bilateral pleural effusions. He was given PO Metoprolol, IV Lasix and Lisinopril; the AFIB rate remained controlled.      Review of Systems  obtained by talking to care staff by phone; the patient thinks that he feels\"OK\"    Constitutional: No fever or chills, substantial generalized weakness over the last 2-3 days, loss of appetite  Ears, Nose & Throat: no sore throat, no nasal drainage, no congestion. No ear pain, no ear drainage, no particular change in hearing  Eyes: no particular change in vision, no redness, no drainage  Cardiovascular: No chest pain at rest, no chest pain with familiar activities.  Pulmonary: No cough, increase in work of breathing,  more shortness of breath with position changes or familiar activites  Gastrointestinal: No abdominal pain, no nausea, no vomiting, no diarrhea. No particular change in bowel movement pattern, no black stools, no bloody stools  Genitourinary: No particular change in incontinence, no pain with urination, no particular change in stream, no particular change in amount urinated with urge, no discharge  Skin: No particular change in bruising, no rashes  Musculoskeletal: no particular change in strength, no particular change in muscle development  Neurological: no numbness and tingling, no headache, no particular change in balance, no dizziness  Psychologic: No particular change in depression and/or anxiety  Endocrine: No particular change in heat or cold intolerance           Past Medical History    I have reviewed this patient's medical history and updated it with pertinent information if needed.   No past medical history on file.    Past Surgical History   I have reviewed this patient's surgical history and updated it with pertinent information if needed.  Past Surgical History:   Procedure Laterality Date     COLONOSCOPY N/A 5/31/2018    Procedure: COLONOSCOPY; "  COLONOSCOPY WITH POLYPECTOMY;  Surgeon: Roldan Vila MD;  Location: [facility] OR     COLONOSCOPY - HIM SCAN  09/01/2012    Colonoscopy, [facility]-NL-5 yr 9/2012       Social History   I have reviewed this patient's social history and updated it with pertinent information if needed.  Social History     Tobacco Use     Smoking status: Never Smoker     Smokeless tobacco: Never Used   Substance Use Topics     Alcohol use: Not on file     Drug use: Not on file       Family History         Prior to Admission Medications   Prior to Admission Medications   Prescriptions Last Dose Informant Patient Reported? Taking?   AFLURIA QUADRIVALENT injection   Yes No   Sig: SHOT   Alcohol Swabs (EASY TOUCH ALCOHOL PREP MEDIUM) 70 % PADS   Yes No   Assure Francisco Lancets MISC   Yes No   BD AUTOSHIELD DUO 30G X 5 MM   Yes No   GLIPIZIDE PO   Yes Yes   Sig: Take 5 mg by mouth every morning (before breakfast)    GLUCOCARD VITAL TEST test strip   Yes No   METFORMIN HCL PO   Yes No   Sig: Take 1,000 mg by mouth 2 times daily (with meals)   NOVOLOG FLEXPEN 100 UNIT/ML soln   Yes Yes   Neomycin-Bacitracin-Polymyxin (TRIPLE ANTIBIOTIC) 3.5-400-5000 OINT ointment   Yes Yes   Pseudoephedrine-APAP  MG TABS   Yes Yes   RisperiDONE (RISPERDAL PO)   Yes Yes   Sig: Take 0.5 mg by mouth 2 times daily   SIMVASTATIN PO   Yes Yes   Sig: Take 40 mg by mouth At Bedtime   ULTICARE 29G X 12.7MM insulin pen needle   Yes No   acetaminophen (TYLENOL) 325 MG tablet   Yes Yes   Sig: Take 325 mg by mouth every 4 hours as needed for mild pain   blood glucose (NO BRAND SPECIFIED) test strip   Yes No   cimetidine (TAGAMET) 400 MG tablet   Yes Yes   Sig: Take 400 mg by mouth 2 times daily   insulin glargine (LANTUS SOLOSTAR) 100 UNIT/ML pen   Yes Yes   Sig: Inject 40 Units Subcutaneous every morning   latanoprost (XALATAN) 0.005 % ophthalmic solution   Yes No   Sig: Place 1 drop into both eyes daily At bedtime   lisinopril (ZESTRIL) 20 MG tablet   Yes No    lisinopril-hydrochlorothiazide (PRINZIDE/ZESTORETIC) 20-12.5 MG per tablet   Yes Yes   Sig: Take 1 tablet by mouth daily   loratadine (CLARITIN) 10 MG tablet   Yes Yes   metFORMIN (GLUCOPHAGE-XR) 500 MG 24 hr tablet   Yes Yes   metoprolol succinate ER (TOPROL-XL) 50 MG 24 hr tablet   Yes Yes   Sig: Take 50 mg by mouth daily   multivitamin (CENTRUM SILVER) tablet 3/22/2021 at Unknown time  No Yes   Sig: Take 1 tablet by mouth daily   mupirocin (BACTROBAN) 2 % external ointment   No No   Sig: Apply topically 2 times daily   nystatin-triamcinolone (MYCOLOG) 178145-3.1 UNIT/GM-% external ointment   Yes Yes   Sig: Apply topically 2 times daily prn   tamsulosin (FLOMAX) 0.4 MG capsule   Yes Yes   Sig: Take 0.4 mg by mouth daily   timolol maleate (TIMOPTIC) 0.25 % ophthalmic solution   Yes Yes   Sig: Place 1 drop into both eyes 2 times daily   timolol maleate (TIMOPTIC) 0.5 % ophthalmic solution   Yes Yes   triamcinolone (KENALOG) 0.1 % external ointment   No Yes   Sig: Apply bid to rash      Facility-Administered Medications: None     Allergies   Not on File    Physical Exam   Vital Signs: Temp: 97.7  F (36.5  C) Temp src: Tympanic BP: 126/95 Pulse: 91   Resp: 24 SpO2: 92 % O2 Device: None (Room air)    Weight: 0 lbs 0 oz     Case reviewed with the ER Provider, EHR reviewed; patient seen in ER room 3    Vital signs:  Temp: 96.4  F (35.8  C) Temp src: Tympanic BP: 138/88 Pulse: 91   Resp: 24 SpO2: 93 % O2 Device: None (Room air)        Estimated body mass index is 35.26 kg/m  as calculated from the following:    Height as of 11/2/20: 1.829 m (6').    Weight as of 11/16/20: 117.9 kg (260 lb).      General: No distress, interactive, light expiratory wheezes are audible, knows that he's in the hospital  Head: normocephalic, no obvious trauma  Eyes: Gaze directed normally, sclera clear, no discharge, no abnormal ocular movements  Ears: Normal appearing age-related external ears, no discharge, stable hearing acuity  loss  Nose: Normal age-related appearance  Mouth: Normal appearing oral mucosa, Gums and throat appear age-related normal  Neck: Normal age-related appearance, age-related range of motion, supple, no adenopathy  Pulmonary: Normal work of breathing, no expiratory delay, no coarseness, no wheezing  Cardiovascular: Distant heart sounds, regular rhythm   Abdomen: No obvious distention, soft, bowel sounds present with normal frequency and pitch  Rectal: Deferred  Back: Age-related normal  Skin: Age-related normal, no rashes. Scrotal swelling  Extremities: Not tender, very little extremity edema. Moving upper and lower extremities  Neurological: Grossly in tact  Psychiatric: Mood is stable, appropriately interactive          Data   Data reviewed today: I reviewed all medications, new labs and imaging results over the last 24 hours.

## 2021-03-23 NOTE — PLAN OF CARE
Most recent vitals: BP (!) 142/110   Pulse 99   Temp 96.9  F (36.1  C) (Tympanic)   Resp 24   Wt 117.6 kg (259 lb 4.2 oz)   SpO2 95%   BMI 35.16 kg/m       A&O, makes needs known, uses call light appropriately,  assist x 1, incontinent of urine. Denies pain. Oxygen 94-98% on RA, expiratory wheezes, BRAY, becomes easily SOB with activity, maintains oxygen sats. Tele: AFIB 90-100s. Denies N/V or abdominal discomfort. Skin warm, dry, scattered patches of dry skin/rash. IV saline locked. Patient BG at 0200 was 98, patient BG 54 with morning labs, patient A&O, given  ml orange, tolerated well. BG rechecks 67 and 96. Patient belongings and call light within reach. Alarms active and audible.     Face to face report given with opportunity to observe patient.    Report given to EDWARD aCmacho RN   3/23/2021  9:09 AM

## 2021-03-23 NOTE — PROGRESS NOTES
Inpatient Occupational Therapy Evaluation    Name: Florentin Reyes MRN# [id number]   Age: 80 year old YOB: 1940     Date of Consultation: 2021  Consultation is requested by: Dr. Bermudez  Specific Consultation Request: assess needs  Primary care provider: Pradeep Fuller    General Information:   Onset Date: 3/22/21    History of Current Problem/Admission: Tachypnea [R06.82]  Confusion [R41.0]    Prior Level of Function: Pt reports at baseline he is able to ambulate with a walker, including to/from meals and into the bathroom. He receives assistance with dressing and bathing, however is independent with toileting.   Ambulation: 1 - Assistive Equipment   Transferrin - Assistive Equipment   Toiletin - Assistive Equipment    Bathing: 3 - Assistive Equipment & Person  Dressin - Assistive Person   Eatin - Independent   Communication: 0 - Understands/communicates without difficulty  Swallowin - swallows foods/liquids without difficulty  Cognition: 1 - attention or memory deficits    Fall history within the last 6 months: Yes, reports 1    Current Living Situation: Pt lives at [facility]. States he has grab bars by the toilet and in the walk in shower; uses a shower chair.     Current Equipment Used at Home: walker, shower chair, grab bars     Patient & Family Goals: did not verbalize     Past Medical History:   No past medical history on file.    Past Surgical History:  Past Surgical History:   Procedure Laterality Date     COLONOSCOPY N/A 2018    Procedure: COLONOSCOPY;  COLONOSCOPY WITH POLYPECTOMY;  Surgeon: Roldan Vila MD;  Location: [facility]     COLONOSCOPY - HIM SCAN  2012    Colonoscopy, [facility]-NL-5 yr 2012       Medications:   Current Facility-Administered Medications   Medication     acetaminophen (TYLENOL) tablet 650 mg     apixaban ANTICOAGULANT (ELIQUIS) tablet 5 mg     calcium carbonate (TUMS) chewable tablet 1,000 mg     glucose gel 15-30 g    Or      dextrose 50 % injection 25-50 mL    Or     glucagon injection 1 mg     famotidine (PEPCID) tablet 20 mg     insulin aspart (NovoLOG) injection (RAPID ACTING)     insulin aspart (NovoLOG) injection (RAPID ACTING)     latanoprost (XALATAN) 0.005 % ophthalmic solution 1 drop     lidocaine (LMX4) kit     lidocaine 1 % 0.1-1 mL     lisinopril (ZESTRIL) tablet 10 mg     melatonin tablet 3 mg     metoprolol succinate ER (TOPROL-XL) 24 hr tablet 25 mg     ondansetron (ZOFRAN-ODT) ODT tab 4 mg    Or     ondansetron (ZOFRAN) injection 4 mg     Patient is already receiving anticoagulation with heparin, enoxaparin (LOVENOX), warfarin (COUMADIN)  or other anticoagulant medication     risperiDONE (risperDAL) tablet 0.5 mg     sodium chloride (PF) 0.9% PF flush 3 mL     sodium chloride (PF) 0.9% PF flush 3 mL     sodium chloride (PF) 0.9% PF flush 3 mL     tamsulosin (FLOMAX) capsule 0.4 mg     timolol maleate (TIMOPTIC) 0.25 % ophthalmic solution 1 drop          Cognitive Status Examination:  Orientation: Oriented to person, place, and month; disoriented to date/year. Pt initially stated his birth year as [birth year] but then corrected with [birth year].   Level of Consciousness: confused  Follows Commands and Answers Questions: 75% of the time  Personal Safety and Judgement: Impaired, At Risk Behaviors Demonstrated, and Impulsive  Memory: Impaired  Comments: Per chart review, pt has a h/o dementia. He currently demonstrates decreased safety awareness.     Pain:   Currently in pain? Denied  Pain Location?   Pain Ratin (No pain)    Occupational Therapy Evaluation:   Integumentary/Edema: scattered patches of redness/rash (per chart review, pt has psoriasis), significant scrotal edema   Range of Motion: BUE's WFL   Strength: Bilateral shoulders grossly 3+/5, bilateral elbows grossly 4/5  Hand Strength: Bilateral gross grasp fair  Muscle Tone Assessment: No issues observed  Coordination: Fair    Mobility:   Bed Mobility: Pt transferred  sit>supine with SBA-independently and he was able to scoot himself up in bed higher with verbal cues  Transfer Skills: ModA x2 sit<>stand and verbal cues for safe hand placement that were mostly unsuccessful; pt required increased assistance from lower surfaces  Bed to Chair/Chair to Bed: ModA x2 and significant verbal cues for safety/sequencing   Balance: Impaired     ADLs:   Lower Body Dressing: Max-TotalA for doffing/donning shoes/braces  Toileting: Pt incontinent of urine in brief; he required TotalA for chandler-cares and to change into a clean brief    SpO2 on RA initially high %, HR mid 90s. After ambulation in rose, SpO2 decreasing to 89%, HR high 120s-131. After eval once pt was resting in bed, SpO2 increased to 99%, HR in the 80s.     IADLs:   Previous Responsibilities of the Patient: Pt receives assist with IADLs     Activities of Daily Living Analysis:   Impairments Contributing to Impaired Activities of Daily Living: balance impaired, cognition/safety impaired, strength decreased, and activity tolerance decreased    Occupational Therapy Interventions: ADL Retraining, cognition, strengthening, progressive activity/exercise, and risk factor education     Clinical Impressions:  Criteria for Skilled Therapeutic Intervention Met: Yes, treatment indicated  OT Diagnosis: Impaired ADLs  Influenced by the following impairments: decreased balance, confusion/impaired safety, weakness, decreased activity tolerance  Functional limitations due to impairment: Increased difficulties with ADLs  Clinical presentation: Stable/Uncomplicated   Clinical presentation rationale: Clinical judgement  Clinical Decision making (complexity): Low Complexity  Frequency: 5 times/week  Predicted Duration of Therapy Intervention (days/wks): 5 days    Anticipated Discharge Disposition: Transitional Care Facility   Anticipated Equipment Needs at Discharge: TBD  Risks and Benefits of therapy have been explained: Yes  Patient, Family &  other staff in agreement with plan of care: Yes  Clinical Impression Comments: Pt presents with OT orders to assess needs. Currently, pt presents with impaired ADLs due to decreased balance, confusion/impaired safety, weakness, & decreased activity tolerance and therefore is requiring assistance x1-2 for ADLs/functional mobility. Pt would not be safe to return to [facility] at this time. He would benefit from short term rehab to address his deficits and maximize his independence and safety when performing daily activities. Plan to treat pt during his acute care stay.     Total Eval Time: 20

## 2021-03-23 NOTE — PLAN OF CARE
Echo completed this morning.  Apical is irregular. Telemetry reading A-fib 100's, per ICU.  Sugars were low prior to shift, but stable all am shift without needing dextrose or insulin.  Assist of 2 from bed to chair.  ABD is distended with no pain.  Is alert and oriented, some confusion noted.  Unsure if he was actually confused or if he was just tired and needed rest.  Room across from nursing station and alarms on for safety.    Face to face report given with opportunity to observe patient.    Report given to EDWARD Malin RN   3/23/2021  4:07 PM

## 2021-03-23 NOTE — PROGRESS NOTES
03/23/21 1100   Signing Clinician's Name / Credentials   Signing clinician's name / credentials Mica Franco OTR/L   Quick Adds   Rehab Discipline OT   OT Discharge Planning    OT Discharge Recommendation (DC Rec) Transitional Care Facility   OT Rationale for DC Rec Pt presents with OT orders to assess needs. Currently, pt presents with impaired ADLs due to decreased balance, confusion/impaired safety, weakness, & decreased activity tolerance and therefore is requiring assistance x1-2 for ADLs/functional mobility. Pt would not be safe to return to [facility] at this time. He would benefit from short term rehab to address his deficits and maximize his independence and safety when performing daily activities. Plan to treat pt during his acute care stay.   OT Brief overview of current status  Pt transferred sit>supine with SBA-independently and he was able to scoot himself up in bed higher with verbal cues. Pt required ModA x2 for sit<>stand and verbal cues for safe hand placement that were mostly unsuccessful; pt required increased assistance from lower surfaces. Pt required ModA x2 and significant verbal cues for safety/sequencing for chair to bed. Pt needed Max-TotalA for doffing/donning shoes/braces. Pt was incontinent of urine in his brief and required TotalA for chandler-cares and to change into a clean brief.    Additional Documentation   Rehab Comments Impaired cognition/safety, weakness, decreased activity tolerance   OT Plan Progress endurance, strength, and ADL status        03/23/21 1100   Signing Clinician's Name / Credentials   Signing clinician's name / credentials Mica Franco OTR/L   Quick Adds   Rehab Discipline OT   OT Discharge Planning    OT Discharge Recommendation (DC Rec) Transitional Care Facility   OT Rationale for DC Rec Pt presents with OT orders to assess needs. Currently, pt presents with impaired ADLs due to decreased balance, confusion/impaired safety, weakness, & decreased activity  tolerance and therefore is requiring assistance x1-2 for ADLs/functional mobility. Pt would not be safe to return to [facility] at this time. He would benefit from short term rehab to address his deficits and maximize his independence and safety when performing daily activities. Plan to treat pt during his acute care stay.   OT Brief overview of current status  Pt transferred sit>supine with SBA-independently and he was able to scoot himself up in bed higher with verbal cues. Pt required ModA x2 for sit<>stand and verbal cues for safe hand placement that were mostly unsuccessful; pt required increased assistance from lower surfaces. Pt required ModA x2 and significant verbal cues for safety/sequencing for chair to bed. Pt needed Max-TotalA for doffing/donning shoes/braces. Pt was incontinent of urine in his brief and required TotalA for chandler-cares and to change into a clean brief.    Additional Documentation   Rehab Comments Impaired cognition/safety, weakness, decreased activity tolerance   OT Plan Progress endurance, strength, and ADL status

## 2021-03-23 NOTE — ED PROVIDER NOTES
History     Chief Complaint   Patient presents with     Generalized Weakness     worse today     Diarrhea     past two weeks     HPI  Florentin Reyes is a 80 year old male who presents here for the nursing home with generalized weakness.  He does have a history of dementia but they see he seems lethargic today.  He has had diarrhea for 2 weeks now.  He did complete a course of Levaquin for what they thought was a pneumonia.  Usually he is able to ambulate with a walker however today he has not been able to ambulate at all he has been too weak and has been in wheelchair.  Denies any headaches.  Denies any sore throat he denies any cough chest pain shortness of breath he denies any abdominal pain no nausea vomiting thin nursing home reports diarrhea.  He denies being short of breath although he is tachypneic and has an audible wheeze    Allergies:  Not on File    Problem List:    Patient Active Problem List    Diagnosis Date Noted     Tachypnea 03/22/2021     Priority: Medium     Confusion 03/22/2021     Priority: Medium        Past Medical History:    No past medical history on file.    Past Surgical History:    Past Surgical History:   Procedure Laterality Date     COLONOSCOPY N/A 5/31/2018    Procedure: COLONOSCOPY;  COLONOSCOPY WITH POLYPECTOMY;  Surgeon: Roldan Vila MD;  Location: [facility] OR     COLONOSCOPY - HIM SCAN  09/01/2012    Colonoscopy, [facility] yr 9/2012       Family History:    No family history on file.    Social History:  Marital Status:  Single [1]  Social History     Tobacco Use     Smoking status: Never Smoker     Smokeless tobacco: Never Used   Substance Use Topics     Alcohol use: Not on file     Drug use: Not on file        Medications:    acetaminophen (TYLENOL) 325 MG tablet  cimetidine (TAGAMET) 400 MG tablet  GLIPIZIDE PO  insulin glargine (LANTUS SOLOSTAR) 100 UNIT/ML pen  lisinopril-hydrochlorothiazide (PRINZIDE/ZESTORETIC) 20-12.5 MG per tablet  loratadine (CLARITIN) 10 MG  tablet  metFORMIN (GLUCOPHAGE-XR) 500 MG 24 hr tablet  metoprolol succinate ER (TOPROL-XL) 50 MG 24 hr tablet  multivitamin (CENTRUM SILVER) tablet  Neomycin-Bacitracin-Polymyxin (TRIPLE ANTIBIOTIC) 3.5-400-5000 OINT ointment  NOVOLOG FLEXPEN 100 UNIT/ML soln  nystatin-triamcinolone (MYCOLOG) 512205-7.1 UNIT/GM-% external ointment  Pseudoephedrine-APAP  MG TABS  RisperiDONE (RISPERDAL PO)  SIMVASTATIN PO  tamsulosin (FLOMAX) 0.4 MG capsule  timolol maleate (TIMOPTIC) 0.25 % ophthalmic solution  timolol maleate (TIMOPTIC) 0.5 % ophthalmic solution  triamcinolone (KENALOG) 0.1 % external ointment  AFLURIA QUADRIVALENT injection  Alcohol Swabs (EASY TOUCH ALCOHOL PREP MEDIUM) 70 % PADS  Assure Francisco Lancets MISC  BD AUTOSHIELD DUO 30G X 5 MM  blood glucose (NO BRAND SPECIFIED) test strip  GLUCOCARD VITAL TEST test strip  latanoprost (XALATAN) 0.005 % ophthalmic solution  lisinopril (ZESTRIL) 20 MG tablet  METFORMIN HCL PO  mupirocin (BACTROBAN) 2 % external ointment  ULTICARE 29G X 12.7MM insulin pen needle          Review of Systems   I did do a complete 10 point review of systems. Pertinent positives and negatives listed per HPI. All other systems have been reviewed and are negative.      Physical Exam   BP: (!) 146/104  Pulse: 98  Temp: 97.6  F (36.4  C)  Resp: 20  SpO2: 94 %      Physical Exam  Vitals signs and nursing note reviewed.   Constitutional:       General: He is not in acute distress.     Appearance: Normal appearance. He is obese. He is not ill-appearing, toxic-appearing or diaphoretic.   HENT:      Head: Normocephalic and atraumatic.   Eyes:      General: No scleral icterus.     Conjunctiva/sclera: Conjunctivae normal.      Pupils: Pupils are equal, round, and reactive to light.   Neck:      Musculoskeletal: Neck supple.   Cardiovascular:      Rate and Rhythm: Tachycardia present. Rhythm irregular.      Pulses: Normal pulses.   Pulmonary:      Comments: Audible wheeze sounds are is coming from his  neck and upper airway area.  The lungs themselves sound clear.  Abdominal:      General: Bowel sounds are normal.      Palpations: Abdomen is soft.      Tenderness: There is no abdominal tenderness.   Genitourinary:     Comments: Significant amount of scrotal edema no tenderness or erythema warmth.  Musculoskeletal:         General: No swelling or tenderness.   Skin:     Findings: Rash present.      Comments: Patient reports that he has psoriasis.   Neurological:      General: No focal deficit present.      Mental Status: He is alert.      Comments: Originally he seemed confused when I visited with him then upon revisitation he said he felt fine he did not seem to be confused conversation was lucent.  Upon subsequent visit to the room he is back to be confused  he is trying to climb out of bed   Psychiatric:      Comments: Dementia history         ED Course   UA is negative.  X-ray is negative.  CT PE abdomen pelvis are unremarkable as well.  No pneumonia.  EKG reveals A. fib.  This is new.  He has no acute ischemic changes though.  Troponins x2 are within normal limits.  I did give him his home blood pressure medicines which included Lopressor and lisinopril.  Originally he had his audible wheeze coming from around his neck area which she can sometimes hear of CHF study given 40 mg IV Lasix he had a lot of urine output.  When I revisit with him he is no longer confused he is sitting up he says he feels great he would like to go home.  He is no longer tachypneic nor is he having any wheezing.  I asked the staff to ambulate him with a walker and in the interim the patient became confused again he was trying to crawl out of bed they were able to redirect him.  This point time he has history of dementia with increase of confusion and tachypnea audible upper airway wheeze sounds like he will have some CHF given his scrotal edema.  He did respond well to diuresis.  He is not able to ambulate.  At this time we will admit  him to the hospitalist service            Results for orders placed or performed during the hospital encounter of 03/22/21 (from the past 24 hour(s))   UA reflex to Microscopic and Culture    Specimen: Catheterized Urine   Result Value Ref Range    Color Urine Yellow     Appearance Urine Clear     Glucose Urine Negative NEG^Negative mg/dL    Bilirubin Urine Negative NEG^Negative    Ketones Urine Negative NEG^Negative mg/dL    Specific Gravity Urine 1.031 1.003 - 1.035    Blood Urine Negative NEG^Negative    pH Urine 5.5 4.7 - 8.0 pH    Protein Albumin Urine 30 (A) NEG^Negative mg/dL    Urobilinogen mg/dL Normal 0.0 - 2.0 mg/dL    Nitrite Urine Negative NEG^Negative    Leukocyte Esterase Urine Negative NEG^Negative    Source Catheterized Urine     RBC Urine 1 0 - 2 /HPF    WBC Urine 1 0 - 5 /HPF    Bacteria Urine None (A) NEG^Negative /HPF    Squamous Epithelial /HPF Urine 0 0 - 1 /HPF    Mucous Urine Present (A) NEG^Negative /LPF   CBC with platelets differential   Result Value Ref Range    WBC 7.8 4.0 - 11.0 10e9/L    RBC Count 4.47 4.4 - 5.9 10e12/L    Hemoglobin 13.9 13.3 - 17.7 g/dL    Hematocrit 42.4 40.0 - 53.0 %    MCV 95 78 - 100 fl    MCH 31.1 26.5 - 33.0 pg    MCHC 32.8 31.5 - 36.5 g/dL    RDW 13.9 10.0 - 15.0 %    Platelet Count 204 150 - 450 10e9/L    Diff Method Automated Method     % Neutrophils 64.3 %    % Lymphocytes 21.2 %    % Monocytes 8.4 %    % Eosinophils 5.4 %    % Basophils 0.4 %    % Immature Granulocytes 0.3 %    Nucleated RBCs 0 0 /100    Absolute Neutrophil 5.0 1.6 - 8.3 10e9/L    Absolute Lymphocytes 1.7 0.8 - 5.3 10e9/L    Absolute Monocytes 0.7 0.0 - 1.3 10e9/L    Absolute Eosinophils 0.4 0.0 - 0.7 10e9/L    Absolute Basophils 0.0 0.0 - 0.2 10e9/L    Abs Immature Granulocytes 0.0 0 - 0.4 10e9/L    Absolute Nucleated RBC 0.0    Comprehensive metabolic panel   Result Value Ref Range    Sodium 143 133 - 144 mmol/L    Potassium 4.3 3.4 - 5.3 mmol/L    Chloride 113 (H) 94 - 109 mmol/L     Carbon Dioxide 23 20 - 32 mmol/L    Anion Gap 7 3 - 14 mmol/L    Glucose 115 (H) 70 - 99 mg/dL    Urea Nitrogen 26 7 - 30 mg/dL    Creatinine 1.13 0.66 - 1.25 mg/dL    GFR Estimate 62 >60 mL/min/[1.73_m2]    GFR Estimate If Black 72 >60 mL/min/[1.73_m2]    Calcium 9.9 8.5 - 10.1 mg/dL    Bilirubin Total 0.4 0.2 - 1.3 mg/dL    Albumin 3.5 3.4 - 5.0 g/dL    Protein Total 7.4 6.8 - 8.8 g/dL    Alkaline Phosphatase 82 40 - 150 U/L    ALT 62 0 - 70 U/L    AST 37 0 - 45 U/L   Lactic acid whole blood   Result Value Ref Range    Lactic Acid 2.8 (H) 0.7 - 2.0 mmol/L   NT pro BNP   Result Value Ref Range    N-Terminal Pro BNP Inpatient 2,195 (H) 0 - 1,800 pg/mL   Troponin I   Result Value Ref Range    Troponin I ES 0.032 0.000 - 0.045 ug/L   XR Chest Port 1 View    Narrative    PROCEDURE: XR CHEST PORT 1 VW 3/22/2021 2:20 PM    HISTORY: lethargy    COMPARISONS: 9/11/2020.    TECHNIQUE: Single view.    FINDINGS: Heart is enlarged with elongation of the thoracic aorta,  similar to the prior exam.    There is questionable patchy atelectasis or infiltrate medially at the  right lung base. No pleural effusion is seen.    Severe degenerative change is seen in the left shoulder.         Impression    IMPRESSION: Question patchy atelectasis or infiltrate medial right  lung base.    JEANNINE THAPA MD   CT Chest (PE) Abdomen Pelvis w Contrast    Narrative    CT CHEST PE ABDOMEN PELVIS W CONTRAST    CLINICAL HISTORY: Male, age 80 years, Shortness of breath;  TACHYCARDIA; ABDOMINAL PAIN; Sepsis;    Comparison:  None.    TECHNIQUE:  CT was performed of the chest, abdomen and pelvis  after  the administration of IV  contrast.   Sagittal, coronal, axial and MIP reconstructions were reviewed.     FINDINGS:  Chest CT:   CTA chest: Excellent opacification of the pulmonary arteries. No  evidence of pulmonary embolus.    After scattered calcifications of the coronary arteries and thoracic  aorta.    Moderate size right and small left-sided  pleural effusion. Areas of  dependent atelectasis in both lungs. No evidence of well-defined focal  consolidation. Mild patchy air trapping throughout both lungs.    Number of normal-sized lymph nodes throughout the aurelia and  mediastinum.    Esophagus is normal.    Abdomen/Pelvis CT:  Stomach and duodenum are normal.    Liver: Normal.    Gallbladder: Large radiodense and radiolucent gallstone.    Spleen: Normal    Pancreas: No acute abnormality. Moderate atrophy.     Adrenal glands: Normal    Kidneys: No acute abdomen. Numerous low dense lesions consistent with  cortical cysts.  Ureters: Normal.  Urinary bladder: Normal.    Large and small bowel: Moderate to large volume of stool in the colon.  No acute abnormality.    Appendix: Normal.    Bony structures: Severe degenerative changes within the right hip  joint. No acute abnormality. Degenerative changes of the lumbar spine.      Impression    IMPRESSION:   Good quality CTA demonstrates no evidence of pulmonary embolus.    Abdominal aorta is intact.    Moderate size right and small left-sided effusion with areas of  dependent atelectasis in both lungs. No evidence of well-defined  pneumonia.    Cholelithiasis.    Low dense renal lesions likely represent cortical cysts. Of  normal-appearing appendix.    DINAH MUSE MD   Troponin I   Result Value Ref Range    Troponin I ES 0.031 0.000 - 0.045 ug/L       Medications   furosemide (LASIX) injection 40 mg (40 mg Intravenous Given 3/22/21 1448)   iopamidol (ISOVUE-370) solution 100 mL (100 mLs Intravenous Given 3/22/21 1607)   sodium chloride (PF) 0.9% PF flush 100 mL (50 mLs Intravenous Given 3/22/21 1607)   metoprolol tartrate (LOPRESSOR) tablet 50 mg (50 mg Oral Given 3/22/21 1728)   lisinopril (ZESTRIL) tablet 20 mg (20 mg Oral Given 3/22/21 1728)       Assessments & Plan (with Medical Decision Making)     I have reviewed the nursing notes.    I have reviewed the findings, diagnosis, plan and need for follow up  with the patient.  Admit to the hospitalist service Dr. Bermudez    New Prescriptions    No medications on file       Final diagnoses:   Confusion   Tachypnea       3/22/2021   [facility]

## 2021-03-23 NOTE — PROGRESS NOTES
03/23/21 1500   Signing Clinician's Name / Credentials   Signing clinician's name / credentials KELLE Harmon Adds   Rehab Discipline PT   PT Discharge Planning    PT Discharge Recommendation (DC Rec) Transitional Care Facility   PT Rationale for DC Rec  PT evaluation completed to assess needs. Based on pt's current level of function he would require 24hr supervision/cares and would not be safe to return to longterm. Currently would recommend discharge to SNF and pt possibly will require more assistance in the long term than he is receiving at his current facility. Plan to treat pt during his stay and monitor progress    PT Brief overview of current status  Pt required min to mod A x2 for transfers using FWW. Needs lots of cueing for safety and sequencing Pt ambulated 50' x 2 with min A using FWW. Required assistance to prevent loss of balance and also required close WC follow for safety. Pt ambulates with shuffled, crouched gait wearing bilateral AFOs. Decreased foot clearance demonstrated bilaterally    Additional Documentation   PT Plan Progress mobility as tolerated   Total Session Time   Total Session Time (minutes) 20 minutes

## 2021-03-23 NOTE — PROVIDER NOTIFICATION
Patient:   CELIA GARCIA            MRN: [id number]            FIN: [id number]              Age:   76 years     Sex:  FEMALE     :  47   Associated Diagnoses:   None   Author:   ABHINAV, SOTO     Progress Note - Surgery  AVSS  PO intake improved, feeding tube out  Carrion in for urinary retention, placed   Minimal pain  RUE with a rash on the upper arm in a band like distribution. No DVT  77 yo F s/p repair of L1 burst fx, rib plating, chest tube for PTX (removed)  - recovering slowly, will likely need rehab  - plan to d/c carrion tomorrow to see if pt can void   Provider notified of patient blood glucose, patient alert and given PO orange juice.

## 2021-03-24 NOTE — PLAN OF CARE
Patient discharged at 10:55 AM via wheel chair accompanied by staff. Prescriptions sent to patients preferred pharmacy. All belongings sent with patient.     Listed belongings gathered and returned to patient.     Patient discharged to [facility].   Report called to called x2, no answer, not able to leave message    Core Measures and Vaccines  Core Measures applicable during stay: No. If yes, state diagnosis:  n/a  Pneumonia and Influenza given prior to discharge, if indicated: No    Surgical Patient   Surgical Procedures during stay: n/a  Did patient receive discharge instruction on wound care and recognition of infection symptoms? N/A    MISC  Follow up appointment made:   clinic to notify facility d/t PCP out of office  Home and hospital aquired medications returned to patient: No  Patient reports pain was well managed at discharge: Yes

## 2021-03-24 NOTE — PLAN OF CARE
Most recent vitals: /91 (BP Location: Left arm)   Pulse 95   Temp 97.1  F (36.2  C) (Tympanic)   Resp 24   Wt 117.6 kg (259 lb 4.2 oz)   SpO2 93%   BMI 35.16 kg/m      Patient remained alert and orientated to self. Intermittent confusion/forgetfullness about time and place. VSS & afebrile. Denied pain. Denied SOB however patient did have intermittent tachypnea and audible expiratory wheezing. Remained on room air saturating above 90%. Abdomen distended but denies nausea or pain. Up with assist of two with walker and gait belt. Remained incontinent or urine.  & 120 . IV SL. Tele report per ICU: Afib 100-110s. Alarms active and audible. Call light within reach.      Face to face report given with opportunity to observe patient.    Report given to Glendy Higuera RN   3/23/2021  11:20 PM    3/23/2021  10:31 PM  Kimo Higuera RN

## 2021-03-24 NOTE — DISCHARGE SUMMARY
[facility]    Discharge Summary  Hospitalist    Date of Admission:  3/22/2021  Date of Discharge:  3/24/2021  Discharging Provider: Kriss Rudolph MD  Date of Service (when I saw the patient): 03/24/21    Discharge Diagnoses   Active Problems:    Tachypnea (3/22/2021)    Confusion (3/22/2021)    Dementia with behavioral disturbances  Acute left-sided systolic heart failure  New onset atrial fibrillation, rate well controlled  Insulin-dependent diabetes type 2  Chronic kidney disease stage II  Generalized weakness      History of Present Illness   Florentin Reyes is an 80 year old male who presented with weakness, agitation.  Please see admission H+P for additional details.    Hospital Course   Florentin Reyes was admitted on 3/22/2021.  72-year-old male with a history of dementia with behavioral disturbances, insulin-dependent diabetes, who came in from his assisted living facility with weakness, and agitation manifesting as anger, lashing out.  He was on to be short of breath with minor activity, but no evidence of hypoxia.  He was worked up in the emergency department, and discovered to have a new onset atrial fibrillation.  His chest film showed bilateral pleural effusions.  He was admitted for new onset atrial fibrillation, possibly rhythm versus rate inducing CHF.  He was given IV Lasix with good response.  Without evidence of respiratory failure nor volume overload despite chest x-ray findings, further dosing of Lasix was held.  Echocardiogram was obtained showing dilated chambers as well as an EF of 30 to 35%.  He was already on beta-blocker as well as ACE inhibitor.  We will start him on a low-dose Lasix at this time as well start him on apixaban for his A. fib.  With his dementia and behavioral disturbances, patient will be better served in a memory care unit.  He has been accepted at [facility] at this time, we will discharge him there and have him follow-up with primary care physician within  1 week.  We will also give him a referral to cardiology for newly diagnosed atrial fibrillation as well as finding of reduced ejection fraction of 30 to 35%.  Review of medical records including [facility]'s records from 2017 shows no history of heart failure nor atrial fibrillation.    Kriss Rudolph MD      Significant Results and Procedures   See below.    Pending Results   These results will be followed up by Pradeep Fuller    Unresulted Labs Ordered in the Past 30 Days of this Admission     No orders found from 2/20/2021 to 3/23/2021.          Code Status   DNR / DNI       Primary Care Physician   Pradeep Fuller    Physical Exam   Temp: 97  F (36.1  C) Temp src: Tympanic BP: 135/99 Pulse: 95   Resp: 24 SpO2: 94 % O2 Device: None (Room air)    Vitals:    03/23/21 0255   Weight: 117.6 kg (259 lb 4.2 oz)     Vital Signs with Ranges  Temp:  [96.4  F (35.8  C)-97.1  F (36.2  C)] 97  F (36.1  C)  Pulse:  [95-97] 95  Resp:  [24] 24  BP: (131-141)/() 135/99  SpO2:  [93 %-94 %] 94 %  I/O last 3 completed shifts:  In: 1376 [P.O.:1376]  Out: -     Constitutional: awake, dementia apparent, confused  Eyes: PERRLA, no injection, no icterus  HEENT: atraumatic, normocephalic  Respiratory: CTA b/l  Cardiovascular: S1 S2 RRR  GI: soft, NT, ND, + bowel sounds  Lymph/Hematologic: no palpable lymphadenopathy  Skin: no rashes, no lesions  Musculoskeletal: trace LE edema b/l, good tone, no deformities  Neurologic: oriented x 1, no focal deficits  Psychiatric: appropriate affect    Discharge Disposition   Discharged to nursing home/Memory care unit  Condition at discharge: Stable    Consultations This Hospital Stay   PHYSICAL THERAPY ADULT IP CONSULT  OCCUPATIONAL THERAPY ADULT IP CONSULT  PHYSICAL THERAPY ADULT IP CONSULT  OCCUPATIONAL THERAPY ADULT IP CONSULT    Time Spent on this Encounter   Krsis DAVIS MD, personally saw the patient today and spent greater than 30 minutes discharging this patient.    Discharge Orders       CARDIOLOGY EVAL ADULT REFERRAL      General info for SNF    Length of Stay Estimate: Short Term Care: Estimated # of Days 31-90  Condition at Discharge: Stable  Level of care:skilled   Rehabilitation Potential: Fair  Admission H&P remains valid and up-to-date: Yes  Recent Chemotherapy: N/A  Use Nursing Home Standing Orders: Yes     Mantoux instructions    Give two-step Mantoux (PPD) Per Facility Policy Yes     Reason for your hospital stay    New afib, heart failure     Glucose monitor nursing POCT    Before meals and at bedtime     Intake and output    Every shift     Daily weights    Call Provider for weight gain of more than 2 pounds per day or 5 pounds per week.     Follow Up and recommended labs and tests    Follow up with Nursing home physician.  No follow up labs or test are needed.     Activity - Up with nursing assistance     No CPR- Do NOT Intubate     Physical Therapy Adult Consult    Evaluate and treat as clinically indicated.    Reason:  weakness     Occupational Therapy Adult Consult    Evaluate and treat as clinically indicated.    Reason:  weakness     Fall precautions     Advance Diet as Tolerated    Follow this diet upon discharge: Orders Placed This Encounter      Combination Diet Low Saturated Fat Na <2400mg Diet     Discharge Medications   Current Discharge Medication List      START taking these medications    Details   apixaban ANTICOAGULANT (ELIQUIS) 5 MG tablet Take 1 tablet (5 mg) by mouth 2 times daily  Qty: 30 tablet, Refills: 11    Associated Diagnoses: Atrial fibrillation, unspecified type (H)      furosemide (LASIX) 20 MG tablet Take 0.5 tablets (10 mg) by mouth daily  Qty: 30 tablet, Refills: 0    Associated Diagnoses: Systolic congestive heart failure, unspecified HF chronicity (H)         CONTINUE these medications which have CHANGED    Details   insulin glargine (LANTUS PEN) 100 UNIT/ML pen Inject 10 Units Subcutaneous every morning    Comments: If Lantus is not covered by  insurance, may substitute Basaglar at same dose and frequency.    Associated Diagnoses: Type 2 diabetes mellitus without complication, with long-term current use of insulin (H)         CONTINUE these medications which have NOT CHANGED    Details   cimetidine (TAGAMET) 400 MG tablet Take 400 mg by mouth 2 times daily      glipiZIDE (GLUCOTROL XL) 5 MG 24 hr tablet Take 5 mg by mouth daily       latanoprost (XALATAN) 0.005 % ophthalmic solution Place 1 drop into both eyes At Bedtime .Wait 3-5 minutes before administering other eye drops.      lisinopril (ZESTRIL) 20 MG tablet Take 20 mg by mouth daily       loratadine (CLARITIN) 10 MG tablet Take 10 mg by mouth daily       metFORMIN (GLUCOPHAGE-XR) 500 MG 24 hr tablet Take 1,000 mg by mouth 2 times daily (with meals)       metoprolol succinate ER (TOPROL-XL) 50 MG 24 hr tablet Take 50 mg by mouth daily      multivitamin (CENTRUM SILVER) tablet Take 1 tablet by mouth daily  Qty: 90 tablet, Refills: 3    Associated Diagnoses: Pressure injury of right buttock, stage 3 (H)      nystatin-triamcinolone (MYCOLOG) 856201-7.1 UNIT/GM-% external ointment Apply topically 2 times daily prn      !! risperiDONE (RISPERDAL) 0.25 MG tablet Take 0.25 mg by mouth 2 times daily along with a 0.5 mg tablet for a total daily dose of 0.75 mg twice daily.      !! risperiDONE (RISPERDAL) 0.5 MG tablet Take 0.5 mg by mouth 2 times daily along with a 0.25 mg tablet for a total daily dose of 0.75 mg twice daily.      tamsulosin (FLOMAX) 0.4 MG capsule Take 0.4 mg by mouth daily      timolol maleate (TIMOPTIC) 0.5 % ophthalmic solution Place 1 drop into both eyes 2 times daily . Wait 3-5 minutes before administering other eye drops.      triamcinolone (KENALOG) 0.1 % external ointment Apply bid to rash  Qty: 454 g, Refills: 3    Associated Diagnoses: Dermatitis      acetaminophen (TYLENOL) 325 MG tablet Take 325 mg by mouth every 4 hours as needed for mild pain      Assure Francisco Lancets MISC Use  to test blood sugar twice daily      GLUCOCARD VITAL TEST test strip 1 strip by In Vitro route 2 times daily       NOVOLOG FLEXPEN 100 UNIT/ML soln Inject 0-11 Units Subcutaneous 3 times daily per sliding scale. < 150=0 units.   151-200=3 units,   201-250=5 units,   251-300=7 units,   301-350=9 units,   351-400 =11 units,   higher than 400 call the nurse      simvastatin (ZOCOR) 40 MG tablet Take 40 mg by mouth At Bedtime       ULTICARE 29G X 12.7MM insulin pen needle Use daily as directed.       !! - Potential duplicate medications found. Please discuss with provider.      STOP taking these medications       timolol maleate (TIMOPTIC) 0.25 % ophthalmic solution Comments:   Reason for Stopping:             Allergies   No Known Allergies  Data   Most Recent 3 CBC's:  Recent Labs   Lab Test 03/24/21  0524 03/23/21  0518 03/22/21  1405   WBC 7.9 8.0 7.8   HGB 13.3 12.8* 13.9   MCV 93 93 95    248 204      Most Recent 3 BMP's:  Recent Labs   Lab Test 03/24/21  0524 03/23/21  0518 03/22/21  1405    144 143   POTASSIUM 3.6 3.6 4.3   CHLORIDE 111* 111* 113*   CO2 29 29 23   BUN 25 23 26   CR 1.18 1.18 1.13   ANIONGAP 3 4 7   DANIELLE 9.6 9.5 9.9   GLC 96 54* 115*     Most Recent 2 LFT's:  Recent Labs   Lab Test 03/22/21  1405 09/11/20  2032   AST 37 19   ALT 62 27   ALKPHOS 82 92   BILITOTAL 0.4 0.2     Most Recent INR's and Anticoagulation Dosing History:  Anticoagulation Dose History     There is no flowsheet data to display.        Most Recent 3 Troponin's:  Recent Labs   Lab Test 03/23/21  1202 03/23/21  0518 03/22/21  1711   TROPI 0.045 0.047* 0.031     Most Recent Cholesterol Panel:No lab results found.  Most Recent 6 Bacteria Isolates From Any Culture (See EPIC Reports for Culture Details):    Most Recent TSH, T4 and A1c Labs:  Recent Labs   Lab Test 03/22/21  1405   TSH 3.58   A1C 6.5*     Results for orders placed or performed during the hospital encounter of 03/22/21   XR Chest Port 1 View    Narrative     PROCEDURE: XR CHEST PORT 1 VW 3/22/2021 2:20 PM    HISTORY: lethargy    COMPARISONS: 9/11/2020.    TECHNIQUE: Single view.    FINDINGS: Heart is enlarged with elongation of the thoracic aorta,  similar to the prior exam.    There is questionable patchy atelectasis or infiltrate medially at the  right lung base. No pleural effusion is seen.    Severe degenerative change is seen in the left shoulder.         Impression    IMPRESSION: Question patchy atelectasis or infiltrate medial right  lung base.    JEANNINE THAPA MD   CT Chest (PE) Abdomen Pelvis w Contrast    Narrative    CT CHEST PE ABDOMEN PELVIS W CONTRAST    CLINICAL HISTORY: Male, age 80 years, Shortness of breath;  TACHYCARDIA; ABDOMINAL PAIN; Sepsis;    Comparison:  None.    TECHNIQUE:  CT was performed of the chest, abdomen and pelvis  after  the administration of IV  contrast.   Sagittal, coronal, axial and MIP reconstructions were reviewed.     FINDINGS:  Chest CT:   CTA chest: Excellent opacification of the pulmonary arteries. No  evidence of pulmonary embolus.    After scattered calcifications of the coronary arteries and thoracic  aorta.    Moderate size right and small left-sided pleural effusion. Areas of  dependent atelectasis in both lungs. No evidence of well-defined focal  consolidation. Mild patchy air trapping throughout both lungs.    Number of normal-sized lymph nodes throughout the aurelia and  mediastinum.    Esophagus is normal.    Abdomen/Pelvis CT:  Stomach and duodenum are normal.    Liver: Normal.    Gallbladder: Large radiodense and radiolucent gallstone.    Spleen: Normal    Pancreas: No acute abnormality. Moderate atrophy.     Adrenal glands: Normal    Kidneys: No acute abdomen. Numerous low dense lesions consistent with  cortical cysts.  Ureters: Normal.  Urinary bladder: Normal.    Large and small bowel: Moderate to large volume of stool in the colon.  No acute abnormality.    Appendix: Normal.    Bony structures: Severe  degenerative changes within the right hip  joint. No acute abnormality. Degenerative changes of the lumbar spine.      Impression    IMPRESSION:   Good quality CTA demonstrates no evidence of pulmonary embolus.    Abdominal aorta is intact.    Moderate size right and small left-sided effusion with areas of  dependent atelectasis in both lungs. No evidence of well-defined  pneumonia.    Cholelithiasis.    Low dense renal lesions likely represent cortical cysts. Of  normal-appearing appendix.    DINAH MUSE MD   Echocardiogram Complete    Narrative    [id number]  [id number]  [id number]  [id number]^SHUN^BLU^ECTOR     [facility]  Echocardiography Laboratory  [address] [place] [id number]     Name: ISHAAN DESOUZA  MRN: [id number]  : 1940  Study Date: 2021 08:04 AM  Age: 80 yrs  Gender: Male  Patient Location: [facility]  Reason For Study: CHF  History: CHF  Ordering Physician: BLU HOFFMANN  Referring Physician: BLU HOFFMANN  Performed By: Cary Moran RDCS     BSA: 2.4 m2  Height: 72 in  Weight: 259 lb  ______________________________________________________________________________  Procedure  Echocardiogram with two-dimensional, color and spectral Doppler performed. The  patient's rhythm is atrial fibrillation.  ______________________________________________________________________________  Interpretation Summary  No pericardial effusion is present.  Mild concentric wall thickening consistent with left ventricular hypertrophy  is present.  Moderately (EF 30-35%) reduced left ventricular function is present.  Mild to moderate right ventricular dilation is present.  Mild to moderate left atrial enlargement is present.  Mild to moderate right atrial enlargement is present.  Mild to moderate mitral insufficiency is present.  Mild aortic insufficiency is present.  Mild tricuspid insufficiency is present.  Trace pulmonic insufficiency is  present.  ______________________________________________________________________________  Left Ventricle  Mild concentric wall thickening consistent with left ventricular hypertrophy  is present. Moderately (EF 30-35%) reduced left ventricular function is  present.     Right Ventricle  Mild to moderate right ventricular dilation is present.     Atria  Mild to moderate left atrial enlargement is present. Mild to moderate right  atrial enlargement is present.     Mitral Valve  Mild to moderate mitral insufficiency is present.     Aortic Valve  Mild aortic insufficiency is present. The mean AoV pressure gradient is 1.3  mmHg. The peak AoV pressure gradient is 2.1 mmHg.     Tricuspid Valve  Mild tricuspid insufficiency is present.     Pulmonic Valve  Trace pulmonic insufficiency is present.     Vessels  The aorta root is normal.     Pericardium  No pericardial effusion is present.     ______________________________________________________________________________  MMode/2D Measurements & Calculations  IVSd: 1.3 cm  LVIDd: 5.0 cm  LVIDs: 4.3 cm  LVPWd: 1.3 cm  FS: 14.7 %  LV mass(C)d: 266.4 grams  LV mass(C)dI: 112.0 grams/m2  Ao root diam: 4.5 cm  LVOT diam: 2.0 cm  LVOT area: 3.2 cm2     RWT: 0.52     Time Measurements  Aortic HR: 288.7 BPM  Pulm. HR: 243.0 BPM     Doppler Measurements & Calculations  Ao V2 max: 73.1 cm/sec  Ao max P.1 mmHg  Ao V2 mean: 55.5 cm/sec  Ao mean P.3 mmHg  Ao V2 VTI: 11.5 cm  MAIKEL(I,D): 3.1 cm2  MAIKEL(V,D): 2.9 cm2  LV V1 max P.8 mmHg  LV V1 max: 67.1 cm/sec  LV V1 VTI: 11.2 cm  CO(LVOT): 10.4 l/min  CI(LVOT): 4.4 l/min/m2  SV(LVOT): 35.9 ml  SI(LVOT): 15.1 ml/m2  PA V2 max: 65.8 cm/sec  PA max P.7 mmHg  PA mean P.94 mmHg  PA V2 VTI: 11.4 cm  AV Claudio Ratio (DI): 0.92  MAIKEL Index (cm2/m2): 1.3     ______________________________________________________________________________  Report approved by: Quyen Rascon 2021 03:30 PM

## 2021-03-24 NOTE — PROGRESS NOTES
Name: Florentin Reyes    MRN#: [id number]    Reason for Hospitalization: Tachypnea [R06.82]  Confusion [R41.0]    Discharge Date: 3/24/2021    Patient / Family response to discharge plan: Accepting    Follow-Up Appt: No future appointments.    Other Providers (Care Coordinator, County Services, PCA services etc): Yes: Jenny Jacobson    Discharge Disposition: Bridget Holguin CM

## 2021-03-24 NOTE — PLAN OF CARE
Physical Therapy Discharge Summary    Reason for therapy discharge:    Discharged to [facility]    Progress towards therapy goal(s). See goals on Care Plan in [facility] electronic health record for goal details.  Goals not met.  Barriers to achieving goals:   discharge from facility.    Therapy recommendation(s):    Per evaluating therapist, SNF was recommended for short term rehab to improve mobility.

## 2021-03-24 NOTE — PLAN OF CARE
Most recent vitals: /99 (BP Location: Left arm)   Pulse 95   Temp 97  F (36.1  C) (Tympanic)   Resp 24   Wt 117.6 kg (259 lb 4.2 oz)   SpO2 94%   BMI 35.16 kg/m       Alert, oriented to self, does not use call light appropriately, makes needs known, incontinent of urine, assist x2 with cares. Afebrile. Denies pain. Lungs dim, expiratory wheezes, oxygen 94% on RA. BRAY, denies SOB at rest. IV saline lock. Did not sleep this shift. Call light and personal items within reach. Alarms active and audible.     Face to face report given with opportunity to observe patient.    Report given to EDWARD Shukla RN   3/24/2021  7:50 AM

## 2021-03-24 NOTE — PLAN OF CARE
Occupational Therapy Discharge Summary    Reason for therapy discharge:    Discharged to [facility].    Progress towards therapy goal(s). See goals on Care Plan in [facility] electronic health record for goal details.  Goals not met.  Barriers to achieving goals:   discharge from facility.    Therapy recommendation(s):    Short term rehab was recommended to maximize patient's safety and independence in ADLs.

## 2021-05-18 NOTE — NURSING NOTE
Chief Complaint   Patient presents with     WOUND CARE       Initial /88   Pulse 124   Temp 97  F (36.1  C) (Tympanic)   Wt 117.5 kg (259 lb)   SpO2 98%   BMI 35.13 kg/m   Estimated body mass index is 35.13 kg/m  as calculated from the following:    Height as of 11/2/20: 1.829 m (6').    Weight as of this encounter: 117.5 kg (259 lb).  Medication Reconciliation: complete  Lesli Romero LPN

## 2021-05-18 NOTE — PROGRESS NOTES
SUBJECTIVE:  Florentin Reyes, 80 year old, male presents with the following Chief Complaint(s) with HPI to follow:  Chief Complaint   Patient presents with     WOUND CARE        Wound Care    HPI:  Florentin is here today with the facility's nurse (Tia) for the reassessment and treatment of multiple wounds.  Back story:  Saw his PCP, Dr. Fuller on 5/12/21.   Referred to Wound Care  Buttocks:  He just moved to their facility about a month ago.  She's not sure how it started.   Florentin sleeps in a bed.   Up in the recliner during the day, but is repositioned every 2 hours  Past tx: non-stick dressing.   Right arm:  Again, he just moved to the facility about a month ago  He's a .    Past tx: open to air  5/18/21: 1st wound care sae with myself.   Denies any fevers, chills, and/or malodorous drainage.   PMH: + diabetes; never smoked   Last A1c  Lab Results   Component Value Date    A1C 6.5 03/22/2021           Patient Active Problem List   Diagnosis     Tachypnea     Confusion       History reviewed. No pertinent past medical history.    Past Surgical History:   Procedure Laterality Date     COLONOSCOPY N/A 5/31/2018    Procedure: COLONOSCOPY;  COLONOSCOPY WITH POLYPECTOMY;  Surgeon: Roldan Vila MD;  Location: [facility]     COLONOSCOPY - HIM SCAN  09/01/2012    Colonoscopy, [facility]-NL-5 yr 9/2012       History reviewed. No pertinent family history.    Social History     Tobacco Use     Smoking status: Never Smoker     Smokeless tobacco: Never Used   Substance Use Topics     Alcohol use: Not on file       Current Outpatient Medications   Medication Sig Dispense Refill     acetaminophen (TYLENOL) 325 MG tablet Take 325 mg by mouth every 4 hours as needed for mild pain       Alcohol Swabs (B-D SINGLE USE SWABS REGULAR) PADS        apixaban ANTICOAGULANT (ELIQUIS) 5 MG tablet Take 1 tablet (5 mg) by mouth 2 times daily 30 tablet 11     Assure Francisco Lancets MISC Use to test blood sugar twice daily       blood glucose (NO BRAND  SPECIFIED) test strip Use to test blood sugar 4 times daily or as directed. 100 strip 11     blood glucose monitoring (NO BRAND SPECIFIED) meter device kit Use to test blood sugar 4 times daily or as directed. 1 kit 0     cimetidine (TAGAMET) 400 MG tablet Take 400 mg by mouth 2 times daily       furosemide (LASIX) 20 MG tablet Take 0.5 tablets (10 mg) by mouth daily 30 tablet 0     glipiZIDE (GLUCOTROL XL) 5 MG 24 hr tablet Take 5 mg by mouth daily        GLUCOCARD VITAL TEST test strip 1 strip by In Vitro route 2 times daily        insulin glargine (LANTUS PEN) 100 UNIT/ML pen Inject 10 Units Subcutaneous every morning       latanoprost (XALATAN) 0.005 % ophthalmic solution Place 1 drop into both eyes At Bedtime .Wait 3-5 minutes before administering other eye drops.       lisinopril (ZESTRIL) 20 MG tablet Take 20 mg by mouth daily        loratadine (CLARITIN) 10 MG tablet Take 10 mg by mouth daily        metFORMIN (GLUCOPHAGE-XR) 500 MG 24 hr tablet Take 1,000 mg by mouth 2 times daily (with meals)        metoprolol succinate ER (TOPROL-XL) 50 MG 24 hr tablet Take 50 mg by mouth daily       multivitamin (CENTRUM SILVER) tablet Take 1 tablet by mouth daily 90 tablet 3     NOVOLOG FLEXPEN 100 UNIT/ML soln Inject 0-11 Units Subcutaneous 3 times daily per sliding scale. < 150=0 units.   151-200=3 units,   201-250=5 units,   251-300=7 units,   301-350=9 units,   351-400 =11 units,   higher than 400 call the nurse       nystatin-triamcinolone (MYCOLOG) 312823-8.1 UNIT/GM-% external ointment Apply topically 2 times daily prn       risperiDONE (RISPERDAL) 0.25 MG tablet Take 0.25 mg by mouth 2 times daily along with a 0.5 mg tablet for a total daily dose of 0.75 mg twice daily.       risperiDONE (RISPERDAL) 0.5 MG tablet Take 0.5 mg by mouth 2 times daily along with a 0.25 mg tablet for a total daily dose of 0.75 mg twice daily.       simvastatin (ZOCOR) 40 MG tablet Take 40 mg by mouth At Bedtime        sodium  hypochlorite (DAKINS HALF-STRENGTH) 0.25 % external solution To be used to dampen gauze for wound care daily 473 mL 1     tamsulosin (FLOMAX) 0.4 MG capsule Take 0.4 mg by mouth daily       timolol maleate (TIMOPTIC) 0.5 % ophthalmic solution Place 1 drop into both eyes 2 times daily . Wait 3-5 minutes before administering other eye drops.       ULTICARE 29G X 12.7MM insulin pen needle Use daily as directed.       triamcinolone (KENALOG) 0.1 % external ointment Apply bid to rash (Patient not taking: Reported on 5/18/2021) 454 g 3       No Known Allergies    REVIEW OF SYSTEMS  Skin: as noted above  Eyes: negative  Ears/Nose/Throat: negative  Respiratory: No shortness of breath, dyspnea on exertion, cough, or hemoptysis  Cardiovascular: negative  Gastrointestinal: negative  Genitourinary: negative  Musculoskeletal: positive for arthritis  Neurologic: negative  Psychiatric: negative  Hematologic/Lymphatic/Immunologic: negative  Endocrine: positive for diabetes    OBJECTIVE:  /88   Pulse 124   Temp 97  F (36.1  C) (Tympanic)   Wt 117.5 kg (259 lb)   SpO2 98%   BMI 35.13 kg/m    Constitutional: alert and no distress  Respiratory:  Good diaphragmatic excursion.   Musculoskeletal: extremities normal- no gross deformities noted  Skin:   Wound description: Type of Wound- pressure; Location- right buttocks, Drainage amount-small/mod, Drainage color- serosanguinous/brown, Odor- none; Wound bed-see picture, Surrounding skin- seeing picture; chronic pressure staining; slight erythema with some induration.  Measurements: 2 x 2 x 1.4 cm (tunnels at noon: 1.8 cm)    Post debridement:      Wound description: Type of Wound- trauma; Location- right arm, Drainage amount-scant, Drainage color-n/a, Odor- none; Wound bed-see picture, Surrounding skin-see picture.  Measurements: 0.1 x 0.1 cm    Dressing change: Wound cleansed with soap and water, dressed with duoderm.     Psychiatric: mentation appears normal for baseline,  affect flat    Washed wound with soap and water.  Dried.    Noted adhered slough   Conservative sharps debridement:  2 patient identifiers used and painted with betadine prior to procedure.  Explained risks and benefits of procedure.  Patient consents to continue.    I removed <20 sqcm of nonviable tissue from on top of wound using a sharp, sterile scalpel and pick ups  Bleeding noted. Electric cautery used.   No pain.  Rinsed with normal saline.    Wound care includes as mentioned above      LABS  No results found for any visits on 05/18/21.    ASSESSMENT / PLAN:  (L89.310) Pressure injury of right buttock, unstageable (H)  (primary encounter diagnosis)  Comment: noted and plan developed  Plan: sodium hypochlorite (DAKINS HALF-STRENGTH) 0.25        % external solution, Miscellaneous Order for         DME - ONLY FOR DME          Dakin's dampen gauze to wound  Cover with dry gauze and abd pad  Changed daily  Offload area    (S41.101D) Open arm wound, right, subsequent encounter  Comment: noted  Plan:   Duoderm  Change twice a week       Treatment goal:  Promote healing   Prevent infection      Patient Instructions   Wash hands prior to dressing change.   Clean instruments as appropriate      Right arm:   Wash wound with mild soap and water, dry  Apply duoderm thin (sample provided)  Change twice a week (shower/bath days)    Right buttocks:  Wash wound with mild soap and water, dry  Apply dakin's dampen gauze to wound  Cover with dry gauze (can add abd pad if need due to increase drainage)  Secure with tape  Change daily    Please call if any questions/concerns and/or problems ([phone])    Follow up   One week    Increase protein intake         Time: 40 min  Barrier: none  Willingness to learn: accepting    Soledad LLANOS [facility]  Diabetes and Wound Care    Cc: Dr. Fuller

## 2021-05-18 NOTE — PATIENT INSTRUCTIONS
Wash hands prior to dressing change.   Clean instruments as appropriate      Right arm:   Wash wound with mild soap and water, dry  Apply duoderm thin (sample provided)  Change twice a week (shower/bath days)    Right buttocks:  Wash wound with mild soap and water, dry  Apply dakin's dampen gauze to wound  Cover with dry gauze (can add abd pad if need due to increase drainage)  Secure with tape  Change daily    Please call if any questions/concerns and/or problems ([phone])    Follow up   One week    Increase protein intake

## 2021-05-25 NOTE — NURSING NOTE
Chief Complaint   Patient presents with     WOUND CARE       Initial /80   Pulse 70   Temp 96.4  F (35.8  C)   SpO2 100%  Estimated body mass index is 35.13 kg/m  as calculated from the following:    Height as of 11/2/20: 1.829 m (6').    Weight as of 5/18/21: 117.5 kg (259 lb).  Medication Reconciliation: complete  Emi Wright MA

## 2021-05-25 NOTE — PROGRESS NOTES
dmeSUBJECTIVE:  Florentin Reyes, 80 year old, male presents with the following Chief Complaint(s) with HPI to follow:  Chief Complaint   Patient presents with     WOUND CARE        Wound Care    HPI:  Florentin is here today with the facility's nurse for the reassessment and treatment of right buttocks and right arm wound.  Back story per his nurse:  Buttocks:  He just moved to their facility about a month ago.  She's not sure how it started.   Florentin sleeps in a bed.   Past tx: non-stick dressing.   Right arm:  Again, he just moved to the facility about a month ago  He's a .    Past tx: open to air  5/18/21: 1st wound care sae with myself. Tx: right arm: thin duoderm; right buttocks: dakin's dampen gauze--changed daily  5/25/21: seeing myself today  No issues with dressing changes.   Nurse reports he more alert today.    Denies any fevers, chills, and/or malodorous drainage.   PMH: + diabetes; never smoked  Last A1c  Lab Results   Component Value Date    A1C 6.5 03/22/2021         Patient Active Problem List   Diagnosis     Tachypnea     Confusion       History reviewed. No pertinent past medical history.    Past Surgical History:   Procedure Laterality Date     COLONOSCOPY N/A 5/31/2018    Procedure: COLONOSCOPY;  COLONOSCOPY WITH POLYPECTOMY;  Surgeon: Roldan Vila MD;  Location: [facility]     COLONOSCOPY - HIM SCAN  09/01/2012    Colonoscopy, [facility]-NL-5 yr 9/2012       History reviewed. No pertinent family history.    Social History     Tobacco Use     Smoking status: Never Smoker     Smokeless tobacco: Never Used   Substance Use Topics     Alcohol use: Not on file       Current Outpatient Medications   Medication Sig Dispense Refill     acetaminophen (TYLENOL) 325 MG tablet Take 325 mg by mouth every 4 hours as needed for mild pain       Alcohol Swabs (B-D SINGLE USE SWABS REGULAR) PADS        apixaban ANTICOAGULANT (ELIQUIS) 5 MG tablet Take 1 tablet (5 mg) by mouth 2 times daily 30 tablet 11     Assure Francisco  Lancets MISC Use to test blood sugar twice daily       blood glucose (NO BRAND SPECIFIED) test strip Use to test blood sugar 4 times daily or as directed. 100 strip 11     blood glucose monitoring (NO BRAND SPECIFIED) meter device kit Use to test blood sugar 4 times daily or as directed. 1 kit 0     cimetidine (TAGAMET) 400 MG tablet Take 400 mg by mouth 2 times daily       furosemide (LASIX) 20 MG tablet Take 0.5 tablets (10 mg) by mouth daily 30 tablet 0     glipiZIDE (GLUCOTROL XL) 5 MG 24 hr tablet Take 5 mg by mouth daily        GLUCOCARD VITAL TEST test strip 1 strip by In Vitro route 2 times daily        insulin glargine (LANTUS PEN) 100 UNIT/ML pen Inject 10 Units Subcutaneous every morning       latanoprost (XALATAN) 0.005 % ophthalmic solution Place 1 drop into both eyes At Bedtime .Wait 3-5 minutes before administering other eye drops.       lisinopril (ZESTRIL) 20 MG tablet Take 20 mg by mouth daily        loratadine (CLARITIN) 10 MG tablet Take 10 mg by mouth daily        metFORMIN (GLUCOPHAGE-XR) 500 MG 24 hr tablet Take 1,000 mg by mouth 2 times daily (with meals)        metoprolol succinate ER (TOPROL-XL) 50 MG 24 hr tablet Take 50 mg by mouth daily       multivitamin (CENTRUM SILVER) tablet Take 1 tablet by mouth daily 90 tablet 3     NOVOLOG FLEXPEN 100 UNIT/ML soln Inject 0-11 Units Subcutaneous 3 times daily per sliding scale. < 150=0 units.   151-200=3 units,   201-250=5 units,   251-300=7 units,   301-350=9 units,   351-400 =11 units,   higher than 400 call the nurse       nystatin-triamcinolone (MYCOLOG) 398666-6.1 UNIT/GM-% external ointment Apply topically 2 times daily prn       risperiDONE (RISPERDAL) 0.25 MG tablet Take 0.25 mg by mouth 2 times daily along with a 0.5 mg tablet for a total daily dose of 0.75 mg twice daily.       risperiDONE (RISPERDAL) 0.5 MG tablet Take 0.5 mg by mouth 2 times daily along with a 0.25 mg tablet for a total daily dose of 0.75 mg twice daily.        simvastatin (ZOCOR) 40 MG tablet Take 40 mg by mouth At Bedtime        sodium hypochlorite (DAKINS HALF-STRENGTH) 0.25 % external solution To be used to dampen gauze for wound care daily 473 mL 1     tamsulosin (FLOMAX) 0.4 MG capsule Take 0.4 mg by mouth daily       timolol maleate (TIMOPTIC) 0.5 % ophthalmic solution Place 1 drop into both eyes 2 times daily . Wait 3-5 minutes before administering other eye drops.       triamcinolone (KENALOG) 0.1 % external ointment Apply bid to rash 454 g 3     ULTICARE 29G X 12.7MM insulin pen needle Use daily as directed.         No Known Allergies    REVIEW OF SYSTEMS  Skin: as noted above  Eyes: negative  Ears/Nose/Throat: negative  Respiratory: No shortness of breath, dyspnea on exertion, cough, or hemoptysis  Cardiovascular: negative  Gastrointestinal: negative  Genitourinary: negative  Musculoskeletal: positive for arthritis  Neurologic: negative  Psychiatric: negative  Hematologic/Lymphatic/Immunologic: negative  Endocrine: positive for diabetes    OBJECTIVE:  /80   Pulse 70   Temp 96.4  F (35.8  C)   SpO2 100%   Constitutional: alert and no distress  Respiratory:  Good diaphragmatic excursion.   Musculoskeletal: extremities normal- no gross deformities noted  Skin:   Wound description: Type of Wound- pressure; Location- right buttocks, Drainage amount-small/mod, Drainage color- serosanguinous/brown, Odor- none; Wound bed-see picture, Surrounding skin-rolled wound edges; chronic pressure staining; no erythema, no induration.  Measurements: 1.6 x 1.5 cm    Dressing change: Wound cleansed with soap and water, dried, debrided and silver nitrate to rolled wound edges, dressed with dakin's dampen gauze, dry gauze, tape.     Wound description: Type of Wound- trauma; Location- right arm, Drainage amount-none, Drainage color-n/a, Odor- none; Wound bed-old scab that got removed when dressing was removed; 100% healed underneath, Surrounding skin-scar tissue, no  erythema  Measurements: n/a    Dressing change: Wound cleansed with soap and water, dry, dressed with thin duoderm.     Psychiatric: mentation appears normal for baseline, more alert today, affect flat    Washed wound with soap and water.  Dried.    Noted loose slough   Conservative sharps debridement:  2 patient identifiers used and painted with betadine prior to procedure.  Explained risks and benefits of procedure.  Patient consents to continue.    I removed <20 sqcm of nonviable tissue from on top of wound using a sharp, sterile curette.    No pain  Small amount of bleeding that stopped with silver nitrate application and pressure   Wound care includes as mentioned above      LABS  No results found for any visits on 05/25/21.    ASSESSMENT / PLAN:  (L89.310) Pressure injury of right buttock, unstageable (H)  (primary encounter diagnosis)  Comment: noted and improving  Plan:   Continue Dakin's dampen gauze  Dry gauze  abd pad (if needed)  Tape  Change daily    (S41.101D) Open arm wound, right, subsequent encounter  Comment: noted and healed  Plan:   Will continue duoderm for another week  Change twice weekly    Treatment goal:  Promote healing   Prevent infection    Patient Instructions   Wash hands prior to dressing change.   Clean instruments as appropriate      Right arm:   Wash wound with mild soap and water, dry  Apply duoderm thin   Change twice a week (shower/bath days)  Do this for the next week, then you can discontinue it.      Right buttocks:  Wash wound with mild soap and water, dry  Apply dakin's dampen gauze to wound  Cover with dry gauze (can add abd pad if need due to increase drainage)  Secure with tape  Change daily    Please call if any questions/concerns and/or problems ([phone])    Follow up   One week    Increase protein intake         Time: 30 min  Barrier: see PMH  Willingness to learn: accepting    Soledad LLANOS [facility]  Diabetes and Wound Care    Cc: Dr. Fuller

## 2021-05-25 NOTE — PATIENT INSTRUCTIONS
Wash hands prior to dressing change.   Clean instruments as appropriate      Right arm:   Wash wound with mild soap and water, dry  Apply duoderm thin   Change twice a week (shower/bath days)  Do this for the next week, then you can discontinue it.      Right buttocks:  Wash wound with mild soap and water, dry  Apply dakin's dampen gauze to wound  Cover with dry gauze (can add abd pad if need due to increase drainage)  Secure with tape  Change daily    Please call if any questions/concerns and/or problems ([phone])    Follow up   One week    Increase protein intake

## 2021-06-07 NOTE — NURSING NOTE
Chief Complaint   Patient presents with     WOUND CARE       Initial BP (!) 145/104   Pulse 139   Temp 98.2  F (36.8  C)   SpO2 95%  Estimated body mass index is 35.13 kg/m  as calculated from the following:    Height as of 11/2/20: 1.829 m (6').    Weight as of 5/18/21: 117.5 kg (259 lb).  Medication Reconciliation: complete  Emi Wright MA

## 2021-06-07 NOTE — PROGRESS NOTES
"SUBJECTIVE:  Florentin Reyes, 80 year old, male presents with the following Chief Complaint(s) with HPI to follow:  Chief Complaint   Patient presents with     WOUND CARE        Wound Care    HPI:  Florentin is here today with the facility's nurse for the reassessment and treatment of right buttocks and right arm wound.  Back story per his nurse:  Buttocks:  He just moved to their facility about a month ago (prior to his 1st appt).  She's not sure how it started.   Florentin sleeps in a bed.   Past tx: non-stick dressing.   Right arm:  Again, he just moved to the facility about a month ago (prior to his 1st appt)  He's a .    Past tx: open to air  5/18/21: 1st wound care sae with myself. Tx: right arm: thin duoderm; right buttocks: dakin's dampen gauze--changed daily  5/25/21: saw myself. Tx: right arm: duoderm; right buttocks: dakin's dampen gauze  6/7/21: seeing myself today  No issues with dressing changes.   Florentin's sleepy today  When asked how he's doing, he states \"good\"  Denies any fevers, chills, and/or malodorous drainage.   PMH: + diabetes; never smoked  Last A1c  Lab Results   Component Value Date    A1C 6.5 03/22/2021     Noted BP  No associated symptoms per nurse    Patient Active Problem List   Diagnosis     Tachypnea     Confusion       History reviewed. No pertinent past medical history.    Past Surgical History:   Procedure Laterality Date     COLONOSCOPY N/A 5/31/2018    Procedure: COLONOSCOPY;  COLONOSCOPY WITH POLYPECTOMY;  Surgeon: Roldan Vila MD;  Location: [facility]     COLONOSCOPY - HIM SCAN  09/01/2012    Colonoscopy, [facility]-5 yr 9/2012       History reviewed. No pertinent family history.    Social History     Tobacco Use     Smoking status: Never Smoker     Smokeless tobacco: Never Used   Substance Use Topics     Alcohol use: Not on file       Current Outpatient Medications   Medication Sig Dispense Refill     acetaminophen (TYLENOL) 325 MG tablet Take 325 mg by mouth every 4 hours as needed " for mild pain       Alcohol Swabs (B-D SINGLE USE SWABS REGULAR) PADS        apixaban ANTICOAGULANT (ELIQUIS) 5 MG tablet Take 1 tablet (5 mg) by mouth 2 times daily 30 tablet 11     Assure Francisco Lancets MISC Use to test blood sugar twice daily       blood glucose (NO BRAND SPECIFIED) test strip Use to test blood sugar 4 times daily or as directed. 100 strip 11     blood glucose monitoring (NO BRAND SPECIFIED) meter device kit Use to test blood sugar 4 times daily or as directed. 1 kit 0     cimetidine (TAGAMET) 400 MG tablet Take 400 mg by mouth 2 times daily       furosemide (LASIX) 20 MG tablet Take 0.5 tablets (10 mg) by mouth daily 30 tablet 0     glipiZIDE (GLUCOTROL XL) 5 MG 24 hr tablet Take 5 mg by mouth daily        GLUCOCARD VITAL TEST test strip 1 strip by In Vitro route 2 times daily        insulin glargine (LANTUS PEN) 100 UNIT/ML pen Inject 10 Units Subcutaneous every morning       latanoprost (XALATAN) 0.005 % ophthalmic solution Place 1 drop into both eyes At Bedtime .Wait 3-5 minutes before administering other eye drops.       lisinopril (ZESTRIL) 20 MG tablet Take 20 mg by mouth daily        loratadine (CLARITIN) 10 MG tablet Take 10 mg by mouth daily        metFORMIN (GLUCOPHAGE-XR) 500 MG 24 hr tablet Take 1,000 mg by mouth 2 times daily (with meals)        metoprolol succinate ER (TOPROL-XL) 50 MG 24 hr tablet Take 50 mg by mouth daily       multivitamin (CENTRUM SILVER) tablet Take 1 tablet by mouth daily 90 tablet 3     NOVOLOG FLEXPEN 100 UNIT/ML soln Inject 0-11 Units Subcutaneous 3 times daily per sliding scale. < 150=0 units.   151-200=3 units,   201-250=5 units,   251-300=7 units,   301-350=9 units,   351-400 =11 units,   higher than 400 call the nurse       nystatin-triamcinolone (MYCOLOG) 455753-8.1 UNIT/GM-% external ointment Apply topically 2 times daily prn       risperiDONE (RISPERDAL) 0.25 MG tablet Take 0.25 mg by mouth 2 times daily along with a 0.5 mg tablet for a total daily  dose of 0.75 mg twice daily.       risperiDONE (RISPERDAL) 0.5 MG tablet Take 0.5 mg by mouth 2 times daily along with a 0.25 mg tablet for a total daily dose of 0.75 mg twice daily.       simvastatin (ZOCOR) 40 MG tablet Take 40 mg by mouth At Bedtime        sodium hypochlorite (DAKINS HALF-STRENGTH) 0.25 % external solution To be used to dampen gauze for wound care daily 473 mL 1     tamsulosin (FLOMAX) 0.4 MG capsule Take 0.4 mg by mouth daily       timolol maleate (TIMOPTIC) 0.5 % ophthalmic solution Place 1 drop into both eyes 2 times daily . Wait 3-5 minutes before administering other eye drops.       triamcinolone (KENALOG) 0.1 % external ointment Apply bid to rash 454 g 3     ULTICARE 29G X 12.7MM insulin pen needle Use daily as directed.         No Known Allergies    REVIEW OF SYSTEMS  Skin: as noted above  Eyes: negative  Ears/Nose/Throat: negative  Respiratory: No shortness of breath, dyspnea on exertion, cough, or hemoptysis  Cardiovascular: negative  Gastrointestinal: negative  Genitourinary: negative  Musculoskeletal: positive for arthritis  Neurologic: negative  Psychiatric: negative  Hematologic/Lymphatic/Immunologic: negative  Endocrine: positive for diabetes    OBJECTIVE:  BP (!) 145/104   Pulse 139   Temp 98.2  F (36.8  C)   SpO2 95%   Constitutional: alert and no distress  Respiratory:  Good diaphragmatic excursion.   Musculoskeletal: extremities normal- no gross deformities noted  Skin:   Wound description: Type of Wound- pressure; Location- right buttocks, Drainage amount-small/mod, Drainage color- serosanguinous, Odor- none; Wound bed-see picture, Surrounding skin-chronic pressure staining; no erythema, no induration.  Measurements: 1.1 x 0.4 cm    Dressing change: Wound cleansed with soap and water, dried, debrided and silver nitrate to rolled wound edges, dressed with dakin's dampen gauze, dry gauze, tape.     Psychiatric: mentation appears normal for baseline, affect flat    Washed wound  with soap and water.  Dried.    Noted loose slough   Conservative sharps debridement:  2 patient identifiers used.  Explained risks and benefits of procedure.  Patient consents to continue.   I removed <20 sqcm of nonviable tissue from on top of wound using a sharp, sterile curette.    No pain  No bleeding   Wound care includes as mentioned above      LABS  No results found for any visits on 06/07/21.    ASSESSMENT / PLAN:  (L89.310) Pressure injury of right buttock, unstageable (H)  (primary encounter diagnosis)  Comment: noted and improving  Plan:   Will discontinue Dakin's dampen gauze    Changed to silver gel  Dry gauze  Tape  Change daily  Continue offloading area    Follow up  2 weeks    Treatment goal:  Promote healing   Prevent infection    Patient Instructions   Wash hands prior to dressing change.   Clean instruments as appropriate    Right buttocks:  Wash wound with mild soap and water, dry  Apply silver gel  Cover with dry gauze  Secure with tape  Change daily  Continue to offload this area    Please call if any questions/concerns and/or problems ([phone])    Follow up   2 weeks    Increase protein intake         Time: 25 min  Barrier: see PMH  Willingness to learn: accepting    Soledad LLANOS [facility]  Diabetes and Wound Care    Cc: Dr. Fuller

## 2021-06-07 NOTE — PATIENT INSTRUCTIONS
Wash hands prior to dressing change.   Clean instruments as appropriate    Right buttocks:  Wash wound with mild soap and water, dry  Apply silver gel  Cover with dry gauze  Secure with tape  Change daily  Continue to offload this area    Please call if any questions/concerns and/or problems ([phone])    Follow up   2 weeks    Increase protein intake

## 2021-06-21 NOTE — PROGRESS NOTES
"SUBJECTIVE:  Florentin Reyes, 80 year old, male presents with the following Chief Complaint(s) with HPI to follow:  Chief Complaint   Patient presents with     WOUND CARE        Wound Care    HPI:  Florentin is here today for the reassessment and treatment of right buttocks and right arm wound.  Back story per his nurse:  Buttocks:  He just moved to their facility about a month ago (prior to his 1st appt).  She's not sure how it started.   Florentin sleeps in a bed.   Past tx: non-stick dressing.   Right arm:  Again, he just moved to the facility about a month ago (prior to his 1st appt)  He's a .    Past tx: open to air  5/18/21: 1st wound care sae with myself. Tx: right arm: thin duoderm; right buttocks: dakin's dampen gauze--changed daily  5/25/21: saw myself. Tx: right arm: duoderm; right buttocks: dakin's dampen gauze  6/7/21: saw myself. Tx: dakin's dampen gauze  6/21/21: seeing myself today  No issues with dressing changes.   States \"good\"  Denies any fevers, chills, and/or malodorous drainage.   PMH: + diabetes; never smoked  Last A1c  Lab Results   Component Value Date    A1C 6.5 03/22/2021       Patient Active Problem List   Diagnosis     Tachypnea     Confusion       History reviewed. No pertinent past medical history.    Past Surgical History:   Procedure Laterality Date     COLONOSCOPY N/A 5/31/2018    Procedure: COLONOSCOPY;  COLONOSCOPY WITH POLYPECTOMY;  Surgeon: Roldan Vila MD;  Location: [facility]     COLONOSCOPY - HIM SCAN  09/01/2012    Colonoscopy, [facility] yr 9/2012       History reviewed. No pertinent family history.    Social History     Tobacco Use     Smoking status: Never Smoker     Smokeless tobacco: Never Used   Substance Use Topics     Alcohol use: Not on file       Current Outpatient Medications   Medication Sig Dispense Refill     acetaminophen (TYLENOL) 325 MG tablet Take 325 mg by mouth every 4 hours as needed for mild pain       Alcohol Swabs (B-D SINGLE USE SWABS REGULAR) PADS    "     apixaban ANTICOAGULANT (ELIQUIS) 5 MG tablet Take 1 tablet (5 mg) by mouth 2 times daily 30 tablet 11     Assure Francisco Lancets MISC Use to test blood sugar twice daily       blood glucose (NO BRAND SPECIFIED) test strip Use to test blood sugar 4 times daily or as directed. 100 strip 11     blood glucose monitoring (NO BRAND SPECIFIED) meter device kit Use to test blood sugar 4 times daily or as directed. 1 kit 0     cimetidine (TAGAMET) 400 MG tablet Take 400 mg by mouth 2 times daily       furosemide (LASIX) 20 MG tablet Take 0.5 tablets (10 mg) by mouth daily 30 tablet 0     glipiZIDE (GLUCOTROL XL) 5 MG 24 hr tablet Take 5 mg by mouth daily        GLUCOCARD VITAL TEST test strip 1 strip by In Vitro route 2 times daily        insulin glargine (LANTUS PEN) 100 UNIT/ML pen Inject 10 Units Subcutaneous every morning       latanoprost (XALATAN) 0.005 % ophthalmic solution Place 1 drop into both eyes At Bedtime .Wait 3-5 minutes before administering other eye drops.       lisinopril (ZESTRIL) 20 MG tablet Take 20 mg by mouth daily        loratadine (CLARITIN) 10 MG tablet Take 10 mg by mouth daily        metFORMIN (GLUCOPHAGE-XR) 500 MG 24 hr tablet Take 1,000 mg by mouth 2 times daily (with meals)        metoprolol succinate ER (TOPROL-XL) 50 MG 24 hr tablet Take 50 mg by mouth daily       multivitamin (CENTRUM SILVER) tablet Take 1 tablet by mouth daily 90 tablet 3     NOVOLOG FLEXPEN 100 UNIT/ML soln Inject 0-11 Units Subcutaneous 3 times daily per sliding scale. < 150=0 units.   151-200=3 units,   201-250=5 units,   251-300=7 units,   301-350=9 units,   351-400 =11 units,   higher than 400 call the nurse       nystatin-triamcinolone (MYCOLOG) 349413-6.1 UNIT/GM-% external ointment Apply topically 2 times daily prn       risperiDONE (RISPERDAL) 0.25 MG tablet Take 0.25 mg by mouth 2 times daily along with a 0.5 mg tablet for a total daily dose of 0.75 mg twice daily.       risperiDONE (RISPERDAL) 0.5 MG tablet  Take 0.5 mg by mouth 2 times daily along with a 0.25 mg tablet for a total daily dose of 0.75 mg twice daily.       simvastatin (ZOCOR) 40 MG tablet Take 40 mg by mouth At Bedtime        sodium hypochlorite (DAKINS HALF-STRENGTH) 0.25 % external solution To be used to dampen gauze for wound care daily 473 mL 1     tamsulosin (FLOMAX) 0.4 MG capsule Take 0.4 mg by mouth daily       timolol maleate (TIMOPTIC) 0.5 % ophthalmic solution Place 1 drop into both eyes 2 times daily . Wait 3-5 minutes before administering other eye drops.       triamcinolone (KENALOG) 0.1 % external ointment Apply bid to rash 454 g 3     ULTICARE 29G X 12.7MM insulin pen needle Use daily as directed.         No Known Allergies    REVIEW OF SYSTEMS  Skin: as noted above  Eyes: negative  Ears/Nose/Throat: negative  Respiratory: No shortness of breath, dyspnea on exertion, cough, or hemoptysis  Cardiovascular: negative  Gastrointestinal: negative  Genitourinary: negative  Musculoskeletal: positive for arthritis  Neurologic: negative  Psychiatric: negative  Hematologic/Lymphatic/Immunologic: negative  Endocrine: positive for diabetes    OBJECTIVE:  BP (!) 135/92 (BP Location: Left arm, Patient Position: Sitting, Cuff Size: Adult Regular)   Pulse 96   Temp 97.4  F (36.3  C) (Tympanic)   SpO2 92%   Constitutional: alert and no distress  Respiratory:  Good diaphragmatic excursion.   Musculoskeletal: extremities normal- no gross deformities noted  Skin:   Wound description: Type of Wound- pressure; Location- right buttocks, Drainage amount-none, Drainage color- n/a, Odor- none; Wound bed-100% healed, Surrounding skin-chronic pressure staining; no erythema, no induration.  Measurements: n/a  Dressing change: Wound cleansed with soap and water, applied non-zinc barrier ointment.     Psychiatric: mentation appears normal for baseline, affect flat    LABS  No results found for any visits on 06/21/21.    ASSESSMENT / PLAN:  (L89.310) Pressure injury  of right buttock, unstageable (H)  (primary encounter diagnosis)  Comment: noted and healed  Plan:   Daily moisturizer with non-zinc ointment      Follow up  As needed    Treatment goal:  Prevent from reopening    Patient Instructions   Wash hands prior to dressing change.   Clean instruments as appropriate    Right buttocks:  Wash area with mild soap and water, dry  Moisturize daily with non-zinc barrier ointment    Please call if any questions/concerns and/or problems ([phone])    Follow up   As needed            Time: 20 min  Barrier: see PMH  Willingness to learn: accepting    Soledad LLANOS [facility]  Diabetes and Wound Care    Cc: Dr. Fuller

## 2021-06-21 NOTE — NURSING NOTE
Chief Complaint   Patient presents with     WOUND CARE       Initial BP (!) 135/92 (BP Location: Left arm, Patient Position: Sitting, Cuff Size: Adult Regular)   Pulse 96   Temp 97.4  F (36.3  C) (Tympanic)   SpO2 92%  Estimated body mass index is 35.13 kg/m  as calculated from the following:    Height as of 11/2/20: 1.829 m (6').    Weight as of 5/18/21: 117.5 kg (259 lb).  Medication Reconciliation: rhett Cottrell

## 2021-06-21 NOTE — PATIENT INSTRUCTIONS
Wash hands prior to dressing change.   Clean instruments as appropriate    Right buttocks:  Wash area with mild soap and water, dry  Moisturize daily with non-zinc barrier ointment    Please call if any questions/concerns and/or problems ([phone])    Follow up   As needed

## 2021-07-31 NOTE — ED NOTES
Plan of care discussed with facility. Nebulizer sent home with patient. AVS sent home with patient.

## 2021-07-31 NOTE — ED TRIAGE NOTES
Patient arrived via EMS. Patient is having . Patient SOB and wheezing. Patient has been /taking zithromax since 7/28/2021. Patient does not have any neb treatments at the facility.

## 2021-07-31 NOTE — ED PROVIDER NOTES
"  History     Chief Complaint   Patient presents with     Wheezing     Shortness of Breath     HPI  Florentin Reyes is a 80 year old male who is transported to the emergency department by EMS with a cough wheezing and shortness of breath.  The patient lives in an assisted living situation.  Apparently a few days ago he began having a cough and was started on azithromycin.  He was Covid tested at that time and his test was \"negative\" according to providers at the facility.  He was tested again today for Covid and again his test was \"negative\".  His wheezing has gotten somewhat worse.  He also continues to cough.  He has not had documented fevers or chills.  There is been no documentation of nausea vomiting diarrhea or constipation.  The patient does have a history of dementia with behavioral disturbance.  He also has a history of atrial fibrillation.  The patient was admitted to this facility in March of this year for new onset atrial fibrillation and congestive heart failure.  Allergies:  No Known Allergies    Problem List:    Patient Active Problem List    Diagnosis Date Noted     Tachypnea 03/22/2021     Priority: Medium     Confusion 03/22/2021     Priority: Medium        Past Medical History:    No past medical history on file.    Past Surgical History:    Past Surgical History:   Procedure Laterality Date     COLONOSCOPY N/A 5/31/2018    Procedure: COLONOSCOPY;  COLONOSCOPY WITH POLYPECTOMY;  Surgeon: Roldan Vila MD;  Location: [facility]     COLONOSCOPY - HIM SCAN  09/01/2012    Colonoscopy, [facility]-NL-5 yr 9/2012       Family History:    No family history on file.    Social History:  Marital Status:  Single [1]  Social History     Tobacco Use     Smoking status: Never Smoker     Smokeless tobacco: Never Used   Substance Use Topics     Alcohol use: Not on file     Drug use: Not on file        Medications:    albuterol (PROVENTIL) (2.5 MG/3ML) 0.083% neb solution  apixaban ANTICOAGULANT (ELIQUIS) 5 MG " "tablet  cimetidine (TAGAMET) 400 MG tablet  furosemide (LASIX) 20 MG tablet  glipiZIDE (GLUCOTROL XL) 5 MG 24 hr tablet  insulin glargine (LANTUS PEN) 100 UNIT/ML pen  latanoprost (XALATAN) 0.005 % ophthalmic solution  lisinopril (ZESTRIL) 20 MG tablet  metFORMIN (GLUCOPHAGE-XR) 500 MG 24 hr tablet  metoprolol succinate ER (TOPROL-XL) 50 MG 24 hr tablet  multivitamin (CENTRUM SILVER) tablet  NOVOLOG FLEXPEN 100 UNIT/ML soln  risperiDONE (RISPERDAL) 0.5 MG tablet  simvastatin (ZOCOR) 40 MG tablet  tamsulosin (FLOMAX) 0.4 MG capsule  timolol maleate (TIMOPTIC) 0.5 % ophthalmic solution  acetaminophen (TYLENOL) 325 MG tablet  Alcohol Swabs (B-D SINGLE USE SWABS REGULAR) PADS  Assure Francisco Lancets MISC  blood glucose (NO BRAND SPECIFIED) test strip  blood glucose monitoring (NO BRAND SPECIFIED) meter device kit  GLUCOCARD VITAL TEST test strip  loratadine (CLARITIN) 10 MG tablet  nystatin-triamcinolone (MYCOLOG) 422919-5.1 UNIT/GM-% external ointment  risperiDONE (RISPERDAL) 0.25 MG tablet  sodium hypochlorite (DAKINS HALF-STRENGTH) 0.25 % external solution  triamcinolone (KENALOG) 0.1 % external ointment  ULTICARE 29G X 12.7MM insulin pen needle          Review of Systems   Constitutional: Negative.    HENT: Negative.    Eyes: Negative.    Respiratory: Positive for cough and shortness of breath.    Cardiovascular: Negative.    Gastrointestinal: Negative.    Genitourinary: Negative.    Musculoskeletal: Negative.    Skin: Negative.    Please see history of chief complaint.  All other systems reviewed and they are found unremarkable.    Physical Exam   BP: (!) 127/101  Pulse: 103  Temp: (!) 96  F (35.6  C)  Resp: 22  SpO2: 96 %      Physical Exam this is a 80-year-old gentleman who is somnolent he is arousable.  He is able to follow some commands.  He states \"I am fine\".  His speech is slightly garbled.  He is rather difficult to understand.  HEENT normocephalic extraocular muscles intact pupils equally round intact " "light.  There is conjunctival injection and a slight amount of drainage from the medial canthus of the left eye.  Tongue midline palate intact oropharynx is clear.  Oral mucosa is moist.  Neck is supple his range of motion without pain or evidence of nuchal irritation.  Pulmonary exam patient has a scattered expiratory wheezes bilaterally.  No rhonchi.  Heart heart rate is irregular S1 and S2 sounds are appreciated.  No murmur.  Patient is not tachycardic.  Abdomen is soft and nontender no mass no organomegaly rebound extremities a full range of motion 5 or 5 strength.  Patient does have some mild pretibial edema.  Neurologic exam the patient is somnolent but arousable.  I did not notice any facial asymmetry or asymmetry and muscle strength.  The patient is not really able to cooperate with a thorough neurologic assessment.  Dermatologic exam there are no diffuse skin rashes or lesions noted.    ED Course        The patient remained stable throughout his stay in the department.  I reassessed him and his wheezing was improved.  He was given intravenous Lasix and breathing treatments.  I will discharge him back to assisted living.  I will advise increasing his Lasix dose to 40 mg a day.  I will also prescribe albuterol solution and a nebulizer for him to use over the weekend.  I will advise that he be reassessed by his primary care provider as soon as possible this coming week but also urged that he be brought back to the emergency department for reassessment if his symptoms do not improve or if they seem to be getting worse.         {EKG done and interpreted by myself.  It shows atrial fibrillation with a ventricular response rate of 106 bpm.  QT intervals 402 ms.  There is no ST segment elevation or depression.  There is no inappropriate T wave inversion.  There does not appear to be evidence of acute ischemia by my interpretation.    Chest x-ray is obtained and it is interpreted by me read radiology is \"lungs it is " "difficult to tell whether there is vascular congestion or diffuse lung inflammatory process but probably vascular congestion.  Pleural spaces unremarkable no pleural effusion no pneumothorax.  Heart mediastinum the heart is enlarged.  Vasculature the aorta is tortuous and ectatic.  Bones joints unremarkable.\"           Results for orders placed or performed during the hospital encounter of 07/31/21 (from the past 24 hour(s))   CBC with platelets differential    Narrative    The following orders were created for panel order CBC with platelets differential.  Procedure                               Abnormality         Status                     ---------                               -----------         ------                     CBC with platelets and d...[[id number]]  Abnormal            Final result                 Please view results for these tests on the individual orders.   Comprehensive metabolic panel   Result Value Ref Range    Sodium 141 133 - 144 mmol/L    Potassium 4.0 3.4 - 5.3 mmol/L    Chloride 112 (H) 94 - 109 mmol/L    Carbon Dioxide (CO2) 22 20 - 32 mmol/L    Anion Gap 7 3 - 14 mmol/L    Urea Nitrogen 19 7 - 30 mg/dL    Creatinine 0.86 0.66 - 1.25 mg/dL    Calcium 9.1 8.5 - 10.1 mg/dL    Glucose 162 (H) 70 - 99 mg/dL    Alkaline Phosphatase 108 40 - 150 U/L    AST 25 0 - 45 U/L    ALT 46 0 - 70 U/L    Protein Total 7.1 6.8 - 8.8 g/dL    Albumin 3.1 (L) 3.4 - 5.0 g/dL    Bilirubin Total 0.7 0.2 - 1.3 mg/dL    GFR Estimate 83 >60 mL/min/1.73m2   Nt probnp inpatient (BNP)   Result Value Ref Range    N terminal Pro BNP Inpatient 5,788 (H) 0-1,800 pg/mL   Troponin I   Result Value Ref Range    Troponin I 0.021 0.000 - 0.045 ug/L   Blood gas venous and oxyhgb   Result Value Ref Range    pH Venous 7.39 7.32 - 7.43    pCO2 Venous 42 40 - 50 mm Hg    pO2 Venous 54 (H) 25 - 47 mm Hg    Bicarbonate Venous 25 21 - 28 mmol/L    FIO2 97     Oxyhemoglobin Venous 82 (H) 70 - 75 %    Base Excess/Deficit (+/-) 0.3 -7.7 " - 1.9 mmol/L   CBC with platelets and differential   Result Value Ref Range    WBC Count 6.3 4.0 - 11.0 10e3/uL    RBC Count 4.24 (L) 4.40 - 5.90 10e6/uL    Hemoglobin 12.6 (L) 13.3 - 17.7 g/dL    Hematocrit 39.1 (L) 40.0 - 53.0 %    MCV 92 78 - 100 fL    MCH 29.7 26.5 - 33.0 pg    MCHC 32.2 31.5 - 36.5 g/dL    RDW 18.6 (H) 10.0 - 15.0 %    Platelet Count 259 150 - 450 10e3/uL    % Neutrophils 58 %    % Lymphocytes 29 %    % Monocytes 6 %    % Eosinophils 5 %    % Basophils 1 %    % Immature Granulocytes 1 %    NRBCs per 100 WBC 0 <1 /100    Absolute Neutrophils 3.7 1.6 - 8.3 10e3/uL    Absolute Lymphocytes 1.8 0.8 - 5.3 10e3/uL    Absolute Monocytes 0.4 0.0 - 1.3 10e3/uL    Absolute Eosinophils 0.3 0.0 - 0.7 10e3/uL    Absolute Basophils 0.0 0.0 - 0.2 10e3/uL    Absolute Immature Granulocytes 0.0 <=0.0 10e3/uL    Absolute NRBCs 0.0 10e3/uL       Medications   albuterol (PROVENTIL) neb solution 2.5 mg (2.5 mg Nebulization Given 7/31/21 1546)   ipratropium - albuterol 0.5 mg/2.5 mg/3 mL (DUONEB) neb solution 3 mL (3 mLs Nebulization Given 7/31/21 1416)   furosemide (LASIX) injection 20 mg (20 mg Intravenous Given 7/31/21 1540)       Assessments & Plan (with Medical Decision Making)     I have reviewed the nursing notes.    I have reviewed the findings, diagnosis, plan and need for follow up with the patient.  The plan is to discharge the patient back to assisted living with appropriate prescriptions, discharge and follow-up instructions.    New Prescriptions    ALBUTEROL (PROVENTIL) (2.5 MG/3ML) 0.083% NEB SOLUTION    Take 1 vial (2.5 mg) by nebulization every 4 hours as needed for shortness of breath / dyspnea       Final diagnoses:   Congestive heart failure, unspecified HF chronicity, unspecified heart failure type (H)   Wheezing       7/31/2021   [facility]     Rubén Conteh,   07/31/21 5539

## 2021-08-29 PROBLEM — I50.9 CONGESTIVE HEART FAILURE, UNSPECIFIED HF CHRONICITY, UNSPECIFIED HEART FAILURE TYPE (H): Status: ACTIVE | Noted: 2021-01-01

## 2021-08-29 PROBLEM — E16.2 HYPOGLYCEMIA: Status: ACTIVE | Noted: 2021-01-01

## 2021-08-29 NOTE — PROVIDER NOTIFICATION
MD updated RE: current vital signs. Patient is now becoming apneic for 20-40 seconds with audible congestion. Question Lasix infusion order given current blood pressures and that D20 is infusing. Reviewed current blood sugar. Also blood sugar check orders do not match the Novolog orders and patient is NPO. MD placing orders.

## 2021-08-29 NOTE — PROGRESS NOTES
Alford placement demonstrated obstruction, which is probably part of the reason for renal injury and fluid overload. Will trial bolus diuretic dosing    No response, so tried IV fluids.    Updated the sister about poor prognosis

## 2021-08-29 NOTE — ED PROVIDER NOTES
History     Chief Complaint   Patient presents with     Generalized Weakness     generalized weakness     Hypoglycemia     low blood glucose this am.     HPI  Florentin Reyes is a 80 year old male who presents today with complaints of generalized weakness and low blood sugar.  Patient reportedly have a blood sugar of about 36 at the assisted living facility.  Was given glucose paste and blood sugar dell to the 40s and eventually into the 90s.  Patient became was initially largely R on arousable but gradually became more alert.  Patient incidentally has had a cough and congestion.  Patient reportedly had his insulin held yesterday.     Allergies:  No Known Allergies    Problem List:    Patient Active Problem List    Diagnosis Date Noted     Tachypnea 03/22/2021     Priority: Medium     Confusion 03/22/2021     Priority: Medium        Past Medical History:    No past medical history on file.    Past Surgical History:    Past Surgical History:   Procedure Laterality Date     COLONOSCOPY N/A 5/31/2018    Procedure: COLONOSCOPY;  COLONOSCOPY WITH POLYPECTOMY;  Surgeon: Roldan Vila MD;  Location: [facility]     COLONOSCOPY - HIM SCAN  09/01/2012    Colonoscopy, [facility]-NL-5 yr 9/2012       Family History:    No family history on file.    Social History:  Marital Status:  Single [1]  Social History     Tobacco Use     Smoking status: Never Smoker     Smokeless tobacco: Never Used   Substance Use Topics     Alcohol use: Not on file     Drug use: Not on file        Medications:    acetaminophen (TYLENOL) 325 MG tablet  albuterol (PROVENTIL) (2.5 MG/3ML) 0.083% neb solution  Alcohol Swabs (B-D SINGLE USE SWABS REGULAR) PADS  apixaban ANTICOAGULANT (ELIQUIS) 5 MG tablet  Assure Francisco Lancets MISC  blood glucose (NO BRAND SPECIFIED) test strip  blood glucose monitoring (NO BRAND SPECIFIED) meter device kit  cimetidine (TAGAMET) 400 MG tablet  furosemide (LASIX) 20 MG tablet  glipiZIDE (GLUCOTROL XL) 5 MG 24 hr tablet  GLUCOCARD  VITAL TEST test strip  insulin glargine (LANTUS PEN) 100 UNIT/ML pen  latanoprost (XALATAN) 0.005 % ophthalmic solution  lisinopril (ZESTRIL) 20 MG tablet  loratadine (CLARITIN) 10 MG tablet  metFORMIN (GLUCOPHAGE-XR) 500 MG 24 hr tablet  metoprolol succinate ER (TOPROL-XL) 50 MG 24 hr tablet  multivitamin (CENTRUM SILVER) tablet  NOVOLOG FLEXPEN 100 UNIT/ML soln  nystatin-triamcinolone (MYCOLOG) 412633-3.1 UNIT/GM-% external ointment  risperiDONE (RISPERDAL) 0.25 MG tablet  risperiDONE (RISPERDAL) 0.5 MG tablet  simvastatin (ZOCOR) 40 MG tablet  sodium hypochlorite (DAKINS HALF-STRENGTH) 0.25 % external solution  tamsulosin (FLOMAX) 0.4 MG capsule  timolol maleate (TIMOPTIC) 0.5 % ophthalmic solution  triamcinolone (KENALOG) 0.1 % external ointment  ULTICARE 29G X 12.7MM insulin pen needle          Review of Systems   Unable to perform ROS: Mental status change (Patient confused and non verbal)       Physical Exam          Physical Exam  Constitutional:       General: He is not in acute distress.     Appearance: Normal appearance. He is normal weight. He is not toxic-appearing.   HENT:      Head: Normocephalic.   Eyes:      Conjunctiva/sclera: Conjunctivae normal.   Cardiovascular:      Rate and Rhythm: Normal rate.   Pulmonary:      Breath sounds: No wheezing.      Comments: Crackles bilateral lower bases  Musculoskeletal:      Cervical back: Normal range of motion.      Right lower leg: Edema present.      Left lower leg: Edema present.   Skin:     General: Skin is warm.      Capillary Refill: Capillary refill takes less than 2 seconds.   Neurological:      Mental Status: He is alert.         ED Course     ED Course as of Aug 29 1306   Sun Aug 29, 2021   [id number] Hospitalist paged      2025 Case discussed with hospitalist Dr. Rudolph who agrees to admit        Procedures         EKG - atrial fibrillation, rate of 62, no ST elevation    CXR - pulm vasc congestion, ? RLL infiltrate       Results for orders placed or  performed during the hospital encounter of 08/29/21 (from the past 24 hour(s))   Glucose by meter   Result Value Ref Range    GLUCOSE BY METER POCT 81 70 - 99 mg/dL   Lactic acid whole blood   Result Value Ref Range    Lactic Acid 4.5 (HH) 0.7 - 2.0 mmol/L   CBC with platelets differential    Narrative    The following orders were created for panel order CBC with platelets differential.  Procedure                               Abnormality         Status                     ---------                               -----------         ------                     CBC with platelets and d...[[id number]]  Abnormal            Final result                 Please view results for these tests on the individual orders.   INR   Result Value Ref Range    INR 2.38 (H) 0.85 - 1.15   Comprehensive metabolic panel   Result Value Ref Range    Sodium 148 (H) 133 - 144 mmol/L    Potassium 4.5 3.4 - 5.3 mmol/L    Chloride 116 (H) 94 - 109 mmol/L    Carbon Dioxide (CO2) 25 20 - 32 mmol/L    Anion Gap 7 3 - 14 mmol/L    Urea Nitrogen 59 (H) 7 - 30 mg/dL    Creatinine 2.63 (H) 0.66 - 1.25 mg/dL    Calcium 9.9 8.5 - 10.1 mg/dL    Glucose 66 (L) 70 - 99 mg/dL    Alkaline Phosphatase 104 40 - 150 U/L    AST 39 0 - 45 U/L    ALT 34 0 - 70 U/L    Protein Total 6.8 6.8 - 8.8 g/dL    Albumin 2.8 (L) 3.4 - 5.0 g/dL    Bilirubin Total 0.5 0.2 - 1.3 mg/dL    GFR Estimate 22 (L) >60 mL/min/1.73m2   Troponin I   Result Value Ref Range    Troponin I 0.053 (H) 0.000 - 0.045 ug/L   CBC with platelets and differential   Result Value Ref Range    WBC Count 10.3 4.0 - 11.0 10e3/uL    RBC Count 4.24 (L) 4.40 - 5.90 10e6/uL    Hemoglobin 12.8 (L) 13.3 - 17.7 g/dL    Hematocrit 39.7 (L) 40.0 - 53.0 %    MCV 94 78 - 100 fL    MCH 30.2 26.5 - 33.0 pg    MCHC 32.2 31.5 - 36.5 g/dL    RDW 19.7 (H) 10.0 - 15.0 %    Platelet Count 72 (L) 150 - 450 10e3/uL    % Neutrophils 84 %    % Lymphocytes 11 %    % Monocytes 4 %    % Eosinophils 0 %    % Basophils 0 %    %  Immature Granulocytes 1 %    NRBCs per 100 WBC 0 <1 /100    Absolute Neutrophils 8.6 (H) 1.6 - 8.3 10e3/uL    Absolute Lymphocytes 1.2 0.8 - 5.3 10e3/uL    Absolute Monocytes 0.4 0.0 - 1.3 10e3/uL    Absolute Eosinophils 0.0 0.0 - 0.7 10e3/uL    Absolute Basophils 0.0 0.0 - 0.2 10e3/uL    Absolute Immature Granulocytes 0.1 (H) <=0.0 10e3/uL    Absolute NRBCs 0.0 10e3/uL   Nt probnp inpatient (BNP)   Result Value Ref Range    N terminal Pro BNP Inpatient 4,048 (H) 0-1,800 pg/mL   XR Chest Port 1 View    Narrative    XR CHEST PORT 1 VIEW    HISTORY: 80 yearsMale 80-year-old male with complaints of hypoglycemia  and cough.  Rule out pneumonia    TECHNIQUE: A single view of the chest was performed    COMPARISON: 30/1/2021    FINDINGS: Lung volumes are low. There is bilateral groundglass  airspace opacity. Appearance is similar to prior study.        Impression    IMPRESSION: Low lung volumes. Bilateral round glass opacity is again  seen, similar to that seen previously. Suspect pulmonary edema.  Pneumonia, such as viral pneumonia, could have a similar appearance.    JANE MEI MD         SYSTEM ID:  [id number]       Medications - No data to display    Assessments & Plan (with Medical Decision Making)     80 year old with complaints of altered level of consciousness presents with blood sugar of 36. Millie to 90 with glucose paste but dropped to 66 while in the ER. Has history of CHF and has pulmonary findings consistent with pulmonary edema.  Patient started on D10 secondary to hypoglycemia noted here.  Given hypoglycemia patient to be admitted for observation. Case discussed with Dr. Rudolph who agrees to admit. I suspect patient will need to be started on Lasix for what appears to be fluid overload as well.  Blood pressure tenuous at this point so will defer for now.  UA pending.    Due to the nature of this electronic medical record, laboratory results, imaging results, diagnosis, other information and medications  reported above may not represent information available to me at the the time of my care and disposition. Medications reported above may have not been ordered by me.     Portions of the record may have been created with voice recognition software. Occasional wrong-word or 'sound-a- like' substitution may have occurred due to the inherent limitations of voice recognition software. Though the chart has been reviewed, there may be inadvertent transcription errors. Read the chart carefully and recognize, using context, where substitutions have occurred.     New Prescriptions    No medications on file       Final diagnoses:   Hypoglycemia   Congestive heart failure, unspecified HF chronicity, unspecified heart failure type (H)       8/29/2021   [facility]     Jose L Dave MD  08/31/21 0439       Jose L Dave MD  09/03/21 0110

## 2021-08-29 NOTE — PLAN OF CARE
Pt is lethargic, does not respond verbally. Awakens to touch and voice. Skin is cold to the touch, highest temp of 96, bear hugger obtained and applied to patient. 3+ edema to BLE and scrotum.     Signs of an old pressure injury to buttock, skin intact. There is a small sheared area or abrasion to buttock. Condom catheter in place from ED, no output. Per report, pt is incontinent at baseline and requires repositioning q2h. Redness noted to both eyes with yellow drainage from L eye. Pt is currently on 2L of oxygen via NC present on admission, with sats mid to upper 90's. HR is mid 40's-50's. IV infusing D10 @ 125mL/hr.     Attempted to contact [facility] for med rec and profile, no answer or voicemail available. Bed alarm active and room door open.

## 2021-08-29 NOTE — PLAN OF CARE
[facility] Inpatient Admission Note:    Patient admitted to 3224/3224-1 at approximately 1440 via bed accompanied by other: staff from emergency room . Report received from EDWARD Moncada in SBAR format at 1317 via telephone. Patient transferred to bed via slide board. Patient is lethargic and MARY ELLEN orientation, MARY ELLEN pain.  Patient oriented to room, unit, hourly rounding, and plan of care. Explained admission packet and patient handbook with patient bill of rights brochure. Will continue to monitor and document as needed.     Inpatient Nursing criteria listed below was met:    Health care directives status obtained and documented: Yes    Patient identifies a surrogate decision maker: MARY ELLEN If yes, who: Contact Information:     If initial lactic acid greater than 2.0, repeat lactic acid drawn within one hour of arrival to unit: No. If no, state reason: lactic ordered, lab unable to draw at that time. Warm blankets applied.     Clergy visit ordered if patient requests: MARY ELLEN    Skin issues/needs documented: Yes    Isolation Patient: yes Education given, correct sign in place and documentation row added to PCS:  Yes    Fall Prevention Yes: Care plan updated, education given and documented, sticker and magnet in place: Yes    Care Plan initiated: Yes    Education Documented (including assessment): Yes    Patient has discharge needs : TBD If yes, please explain:TBD

## 2021-08-29 NOTE — PROVIDER NOTIFICATION
Requested hospitalist come to patient's room for a quick check and to clarify orders due to current vital signs and patient condition. Hospitalist recommends getting in 2nd IV access for IV antibiotics and labs due to patient needing to be poked numerous times for 2nd IV site. Question neves catheter placement due to current patient condition, vital signs and medication orders. Scrotum is also very swollen so condom catheter is unable to stay on. MD placing orders.

## 2021-08-29 NOTE — H&P
[facility]    History and Physical  Hospitalist       Date of Admission:  8/29/2021  Date of Service (when I saw the patient): 08/29/21    Assessment & Plan   Florentin Reyes is a 80 year old male who presents with generalized weakness, hypoglycemia.    Generalized weakness: Multifactorial.  Patient is from assisted living facility/group home.  Combination of renal failure, volume overload in the setting of systolic heart failure, possible infectious etiology not ruled out yet.  Profound hypoglycemia on admission.  Procalcitonin undetectable.  Afebrile, no significant leukocytosis.  -Treat underlying conditions (see below), supportive care    Hypoglycemia: Patient has a history of insulin-dependent diabetes mellitus, he is on 10 units Lantus daily as well as mealtime sliding scale.  Patient reported to have blood sugar in the 30s at assisted living today.  This was refractory to glucose paste.  D10 drip started in emergency department.  Patient's insulin is administered by the assisted living, doubt insulin overdose.  Possible occult infectious process.  -Continue D10 drip  -Holding all oral diabetes medications as well as insulin  -Hypoglycemic protocol    CVS: Patient has history of systolic left-sided heart failure with ejection fraction 30 to 35% as seen on echocardiogram from March 2021.  He also has significant left ventricular hypertrophy.  Patient takes 20 mg of Lasix daily.  Patient has chronic atrial fibrillation on Eliquis.  Slight troponin leak to 0.053, likely demand ischemia.  BNP is 4000, has significant lower extremity edema.  Chest x-ray is consistent with pulmonary edema as well.  Acute kidney injury as well, possible cardiorenal syndrome.  Lactate 4.5 could indicate hypoperfusion in the setting of CHF.  -Patient does appear to be in heart failure, although hypotension and acute kidney injury prevents aggressive diuresis  -We will place patient on a Lasix drip  -Holding lisinopril,  metoprolol    Acute kidney injury: Creatinine 2.63 with GFR of 22.  In July 2021, patient's creatinine was 0.86 with a GFR of 83.  BNP is over 4000, patient does appear volume overloaded with lower extremity edema as well as chest x-ray with bilateral infiltrates.  Suggestive of cardiorenal syndrome.  -Due to hypotension, acute kidney injury, aggressive diuresis is precluded  -We will start Lasix gtt, close monitoring of renal function, hemodynamics and electrolytes    Hyperlipidemia: Continue home Zocor    Dementia with behavioral disturbances: Patient is on Risperdal, will continue as able when patient is more awake.    FEN: Slight hyponatremia, suggesting mild free water deficit.  Patient is on D10.  N.p.o. until more awake.    Chronic urinary catheter: Appears to be a condom catheter attached to a leg bag.  -UA is pending    DVT Prophylaxis: DOAC    Code Status: DNR / DNI    Disposition: Expected discharge in 2-3 days once improved.    Kriss Rudolph MD        Primary Care Physician   Pradeep Fuller    Chief Complaint   Hypoglycemia, decreased responsiveness    Unable to obtain a history from the patient due to mental status    History of Present Illness   Florentin Reyes is a 80 year old male with history of insulin-dependent diabetes mellitus, dementia with behavioral disturbances currently in assisted living, left-sided systolic heart failure with ejection fraction of 30 to 35%, who presents with unexplained hypoglycemia at his assisted living this morning.  It proved to be refractory to glucose paste, so patient was brought into the emergency room for evaluation.  Patient does live in assisted living, so exogenous insulin or dosing error is unlikely.  The patient himself cannot give any history.  He is minimally responsive to stimuli.  Laboratory work-up reveals acute kidney injury, chest x-ray showing bilateral infiltrates suggestive of pulmonary edema, BNP over 4000.  Procalcitonin is undetectable, patient  is not febrile, no leukocytosis, no suggestion of occult infectious process.  He does have 3-4+ pitting edema in lower extremities.  Slight troponin leak as well.  EKG nondiagnostic for ischemic changes.  Patient is being admitted for refractory hypoglycemia and what appears to be CHF exacerbation and acute kidney injury.      Past Medical History    I have reviewed this patient's medical history and updated it with pertinent information if needed.       Past Surgical History   I have reviewed this patient's surgical history and updated it with pertinent information if needed.  Past Surgical History:   Procedure Laterality Date     COLONOSCOPY N/A 2018    Procedure: COLONOSCOPY;  COLONOSCOPY WITH POLYPECTOMY;  Surgeon: Roldan Vila MD;  Location: [facility] OR     COLONOSCOPY - HIM SCAN  2012    Colonoscopy, [facility] 2012       Prior to Admission Medications   Prior to Admission Medications   Prescriptions Last Dose Informant Patient Reported? Taking?   Alcohol Swabs (B-D SINGLE USE SWABS REGULAR) PADS   Yes No   Assure Francisco Lancets MISC   Yes No   Sig: Use to test blood sugar twice daily   GLUCOCARD VITAL TEST test strip   Yes No   Si strip by In Vitro route 2 times daily    NOVOLOG FLEXPEN 100 UNIT/ML soln   Yes No   Sig: Inject 0-11 Units Subcutaneous 3 times daily per sliding scale. < 150=0 units.   151-200=3 units,   201-250=5 units,   251-300=7 units,   301-350=9 units,   351-400 =11 units,   higher than 400 call the nurse   ULTICARE 29G X 12.7MM insulin pen needle   Yes No   Sig: Use daily as directed.   acetaminophen (TYLENOL) 325 MG tablet   Yes No   Sig: Take 325 mg by mouth every 4 hours as needed for mild pain   albuterol (PROVENTIL) (2.5 MG/3ML) 0.083% neb solution   No No   Sig: Take 1 vial (2.5 mg) by nebulization every 4 hours as needed for shortness of breath / dyspnea   apixaban ANTICOAGULANT (ELIQUIS) 5 MG tablet   No No   Sig: Take 1 tablet (5 mg) by mouth 2 times daily   blood  glucose (NO BRAND SPECIFIED) test strip   No No   Sig: Use to test blood sugar 4 times daily or as directed.   blood glucose monitoring (NO BRAND SPECIFIED) meter device kit   No No   Sig: Use to test blood sugar 4 times daily or as directed.   cimetidine (TAGAMET) 400 MG tablet   Yes No   Sig: Take 400 mg by mouth 2 times daily   furosemide (LASIX) 20 MG tablet   No No   Sig: Take 0.5 tablets (10 mg) by mouth daily   glipiZIDE (GLUCOTROL XL) 5 MG 24 hr tablet   Yes No   Sig: Take 5 mg by mouth daily    insulin glargine (LANTUS PEN) 100 UNIT/ML pen   No No   Sig: Inject 10 Units Subcutaneous every morning   latanoprost (XALATAN) 0.005 % ophthalmic solution   Yes No   Sig: Place 1 drop into both eyes At Bedtime .Wait 3-5 minutes before administering other eye drops.   lisinopril (ZESTRIL) 20 MG tablet   Yes No   Sig: Take 20 mg by mouth daily    loratadine (CLARITIN) 10 MG tablet   Yes No   Sig: Take 10 mg by mouth daily    metFORMIN (GLUCOPHAGE-XR) 500 MG 24 hr tablet   Yes No   Sig: Take 1,000 mg by mouth 2 times daily (with meals)    metoprolol succinate ER (TOPROL-XL) 50 MG 24 hr tablet   Yes No   Sig: Take 50 mg by mouth daily   multivitamin (CENTRUM SILVER) tablet   No No   Sig: Take 1 tablet by mouth daily   nystatin-triamcinolone (MYCOLOG) 175191-0.1 UNIT/GM-% external ointment   Yes No   Sig: Apply topically 2 times daily prn   risperiDONE (RISPERDAL) 0.25 MG tablet   Yes No   Sig: Take 0.25 mg by mouth 2 times daily along with a 0.5 mg tablet for a total daily dose of 0.75 mg twice daily.   risperiDONE (RISPERDAL) 0.5 MG tablet   Yes No   Sig: Take 0.5 mg by mouth 2 times daily along with a 0.25 mg tablet for a total daily dose of 0.75 mg twice daily.   simvastatin (ZOCOR) 40 MG tablet   Yes No   Sig: Take 40 mg by mouth At Bedtime    sodium hypochlorite (DAKINS HALF-STRENGTH) 0.25 % external solution   No No   Sig: To be used to dampen gauze for wound care daily   tamsulosin (FLOMAX) 0.4 MG capsule   Yes  No   Sig: Take 0.4 mg by mouth daily   timolol maleate (TIMOPTIC) 0.5 % ophthalmic solution   Yes No   Sig: Place 1 drop into both eyes 2 times daily . Wait 3-5 minutes before administering other eye drops.   triamcinolone (KENALOG) 0.1 % external ointment   No No   Sig: Apply bid to rash      Facility-Administered Medications: None     Allergies   No Known Allergies    Social History   I have reviewed this patient's social history and updated it with pertinent information if needed. Florentin RIVERA Amy  reports that he has never smoked. He has never used smokeless tobacco.    Family History   I have reviewed this patient's family history and updated it with pertinent information if needed.       Review of Systems   The 10 point Review of Systems could not be obtained due to patient's mental status.  He is demented, nonverbal at this time.    Physical Exam   Temp: 96.9  F (36.1  C) Temp src: Tympanic BP: (!) 89/73 Pulse: 82   Resp: 22 SpO2: 99 % O2 Device: None (Room air)    Vital Signs with Ranges  Temp:  [96.9  F (36.1  C)] 96.9  F (36.1  C)  Pulse:  [61-82] 82  Resp:  [20-22] 22  BP: (79-91)/(59-73) 89/73  SpO2:  [93 %-99 %] 99 %  0 lbs 0 oz    Constitutional: non-verbal, somnolent, reacts to stimuli  Eyes: PERRLA, no injection, no icterus  HEENT: atraumatic, normocephalic  Respiratory: diminished breath sounds b/l, crackles at bases  Cardiovascular: S1 S2 RRR  GI: soft, NT, ND  Lymph/Hematologic: no palpable lymphadenopathy  Skin: pale, no rashes, no lesions  Musculoskeletal: 3-4+ pitting edema LEs, good tone, no deformities  Neurologic: somnolent, no focal motor deficits  Psychiatric: unable to assess, patient is allegedly non verbal at baseline     Data   Data reviewed today:  I personally reviewed imaging reports.  Recent Labs   Lab 08/29/21  1329 08/29/21  1158 08/29/21  1136   WBC  --  10.3  --    HGB  --  12.8*  --    MCV  --  94  --    PLT  --  72*  --    INR  --  2.38*  --    NA  --  148*  --    POTASSIUM   --  4.5  --    CHLORIDE  --  116*  --    CO2  --  25  --    BUN  --  59*  --    CR  --  2.63*  --    ANIONGAP  --  7  --    DANIELLE  --  9.9  --    GLC 77 66* 81   ALBUMIN  --  2.8*  --    PROTTOTAL  --  6.8  --    BILITOTAL  --  0.5  --    ALKPHOS  --  104  --    ALT  --  34  --    AST  --  39  --    TROPONIN  --  0.053*  --      Lactic Acid   Date Value Ref Range Status   08/29/2021 4.5 (HH) 0.7 - 2.0 mmol/L Final   03/22/2021 3.3 (H) 0.7 - 2.0 mmol/L Final     Comment:     Significant value called to and read back by  Kanika Motley at 2205 by SG     03/22/2021 2.8 (H) 0.7 - 2.0 mmol/L Final     Comment:     Significant value called to and read back by  Angela Miller at 1413 by SG     09/11/2020 2.6 (H) 0.7 - 2.0 mmol/L Final     Comment:     Significant value called to and read back by  SIERRA WILLARD AT 2309 ON 9/11/20 BY GILBERT         Recent Results (from the past 24 hour(s))   XR Chest Port 1 View    Narrative    XR CHEST PORT 1 VIEW    HISTORY: 80 yearsMale 80-year-old male with complaints of hypoglycemia  and cough.  Rule out pneumonia    TECHNIQUE: A single view of the chest was performed    COMPARISON: 30/1/2021    FINDINGS: Lung volumes are low. There is bilateral groundglass  airspace opacity. Appearance is similar to prior study.        Impression    IMPRESSION: Low lung volumes. Bilateral round glass opacity is again  seen, similar to that seen previously. Suspect pulmonary edema.  Pneumonia, such as viral pneumonia, could have a similar appearance.    JANE MEI MD         SYSTEM ID:  [id number]

## 2021-08-29 NOTE — PROVIDER NOTIFICATION
DATE:  8/29/2021   TIME OF RECEIPT FROM LAB:  7771   LAB TEST:  Lactic  LAB VALUE:  4.0  RESULTS GIVEN WITH READ-BACK TO Dr. Bermudez  TIME LAB VALUE REPORTED TO PROVIDER:   4986

## 2021-08-30 NOTE — PROVIDER NOTIFICATION
MD updated RE: BP is now lower as charted. Temp 95.4. No real urine output since catheter was placed. Accu check up to 142. Apnea of 30-40 seconds continues. MD placing orders.

## 2021-08-30 NOTE — PROGRESS NOTES
Care Transitions focused note:      Spoke with Jayy, nurse at [facility].  Florentin able to return to facility with hospice services at discharge.  Spoke with Isabel with [facility], she will check on availability and contact writer back.  Updated sister, Erinn.  Reviewed IMM letter. Left message for Jenny Jacobson, [facility] care coordinator.

## 2021-08-30 NOTE — PLAN OF CARE
Face to face report given with opportunity to observe patient.    Report given to Ovi Coleman RN   8/30/2021  6:59 AM

## 2021-08-30 NOTE — PLAN OF CARE
BP (!) 82/50   Pulse 71   Temp 96.8  F (36  C) (Tympanic)   Resp 25   Wt 113.2 kg (249 lb 9 oz)   SpO2 (!) 91%   BMI 33.85 kg/m  . BP 60's-80's/30-50's, receiving 1L bolus at this time. Sats 88-90% on 2lpm, increase to 3lpm et sats 90-91%, RR 25 with 30-40 sec periods of apnea. Lung sounds coarse with rhonchi throughout. Unresponsive, except when turning upper body tremors et moans, otherwise does not rouse to anything. FLACC score 0 at rest et 5 with activity/repositioning. T.O. for wound care consult, bilat buttocks et L lateral foot, R lateral foot, et R lateral ankle (appears to be blister) dark purple et appears to be possible pressure injuries. Alford patent.  Repositioned q2hrs, along with oral cares. T 96.8 shadi hugger in place, skin warm to touch, color pale.

## 2021-08-30 NOTE — PLAN OF CARE
Prior to Admission Medication Reconciliation:     Medications added:   [x] None  [] As listed below:    Medications deleted:   [] None  [x] As listed below:    dakins- therapy completed    Kenalog- therapy completed    Medications marked for review/removal by attending:  [x] None  [] As listed below:    Changes made to existing medications:   [] None  [x] As listed below:    LASIX FROM 10 MG DAILY TO 20 MG BID    LANTUS FROM DAILY TO at bedtime     Last times/dates taken verified with patient:  [x] Yes- completed myself: LAST TIMES NOT INCLUDED WITH MAR- PT ADMITTED FOR 1 DAY. WILL NOT GET EXACT LAST TIMES UNLESS REQUESTED  [] Prepared PTA medlist for review only. (will not be available to review personally)  [] Did not review with patient. Rx verification only. Review completed by nursing.    [] Nurse completed no changes made (double checked entries)  [] Unable to review with patient at this time:  [] Nurse completed/changes made:         Allergies listed at another location:  []Not applicable   []See below:    Allergy review:    []Did not review: reviewed by nursing  []Did not review: pt unable at this time  [x]Patient/MAR verified NKDA  []Patient/MAR verified current existing allergies: no changes made    Medication reconciliation sources:   []Patient  []Patient family member/emergency contact: **  [x][facility] Report Review:  Home Medications     - Last Reconciled 08/06/21 by Ana Lilia Dillon, Med Assist    [x]acetaminophen 325 mg tablet 325 mg PO Q4H PRN   alcohol swabs (BD Alcohol Swabs) 1 pad topical AS DIRECTED   [x]apixaban 5 mg tablet (Eliquis) 5 mg PO BID   blood sugar diagnostic (Glucocard Vital Test Strips) 1 strip MISC QID   blood-glucose meter (Glucocard Vital) As directed   [x]cimetidine 400 mg tablet  400 mg PO BID   [x]furosemide 20 mg tablet (Lasix) 10 mg (1/2 x 20 mg) PO DAILY   [x]glipizide 5 mg tablet, extended release 24 hr TAKE 1 TABLET BY MOUTH ONCE DAILY.   [x]insulin aspart U-100 100  "unit/mL (3 mL) subcutaneous pen (Novolog Flexpen U-100 Insulin aspart) 1 unit (0.01 mL) SUBCUT .PER SLIDING SCALE   [x]insulin glargine 100 unit/mL (3 mL) subcutaneous pen (Lantus Solostar U-100 Insulin) INJECT 10 UNITS SQ DAILY AS DIRECTED.   lancets 30 gauge (OneTouch Delica Plus Lancet) use to test blood sugar four times per day;    [x]latanoprost 0.005 % eye drops (Xalatan) 1 drp Both Eyes QPM   [x]lisinopril 20 mg tablet 20 mg PO DAILY   [x]loratadine 10 mg tablet 10 mg PO DAILY   [x]metformin 500 mg tablet,extended release 24 hr 1,000 mg (2 x 500 mg) PO BID   [x]metoprolol succinate 50 mg tablet,extended release 24 hr TAKE 1 TABLET BY MOUTH ONCE DAILY.   miscellaneous medical supply  Standard large wheelchair   miscellaneous medical supply  lancet pen, use daily to check BS   [x]multivitamin  1 tab PO DAILY   pen needle, diabetic 29 gauge x 1/2\" (UltiCare Pen Needle) 1 ea MISC DAILY   [x]risperidone 0.5 mg tablet 0.5 mg PO BID   [x]risperidone 0.25 mg tablet 0.25 mg PO BID   [x]simvastatin 40 mg tablet 40 mg PO QPM   [x]tamsulosin 0.4 mg capsule 0.4 mg PO DAILY   [x]timolol maleate 0.5 % eye drops 1 drp Both Eyes BID   [x]triamcinolone acetonide 0.1 % topical ointment1 applic topical BID   []Epic Chart Review  []Care Everywhere review  []Pharmacy med list: **  []Pharmacy phone call  [x]Outside meds dispense report  [x]Nursing home or Assisted Living MAR: [facility]- also called to verify PRN's missing from MAR sent and removal of dakins and kenalog  [x]TAGAMET 400 MG bid  [x]FLOMAX 0.4 MG DAILY   [x]GLIPIZIDE 5 MG DAILY   [x]LANTUS 10 UNITS DAILY AT 8:00 PM  [x]XALATAN 1 GTT BOTH EYES at bedtime   [x]LISINOPRIL 20 MG QAM   [x]LORATADINE 10 MG DAILY   [x]METFORMIN 500 MG 2 TABS BID   [x]METOPROLOL ER 50 MG DAILY  [x]MULTIVITAMIN DAILY   [x]RISPERIDONE 0.25 MG + 0.5 MG BID  [x]SIMVASTATIN 40 G BEDTIME  [x]TIMOLOL 0.5% 1 GTT BOTH EYE BID  [x]ELIQUIS 5 MG BID  [x]LASIX 20 MG BID  [x]NOVOLOG FLEXPEN 100 UNITS/ML " TID PER sliding scale    BG < 150= 0,   151-200= 3   201-250= 5,   251-300= 7,   301-350= 9,   351-400=11   BG> 400 NOTIFY NURSE        []Other: **    Pharmacy desired at discharge: [facility]    Is patient on coumadin?  [x]No      Requests for consultation by provider or pharmacist:   [] Patient understands why all of their meds were prescribed and how to take them. No questions.   [x] Managing party has no questions.   [] Patient/ managing party has questions about the following:  [] Did not review with patient. Cannot assess.     Fill dates and reported compliancy:  [x] Fill dates coincide with compliancy for all/most maintenance meds.   [] Fill dates do not coincide with compliancy with maintenance meds. See notes in PTA medlist and in comments.    [] Fill dates do not coincide with the following medications but pt reports compliancy:  [] Did not review with patient. Cannot assess.     Historian accuracy:  [] Excellent- alert and oriented, understands why meds were prescribed and how to take, able to answer specifics  [] Good- alert and oriented, understands why meds were prescribed and how to take, some confusion   [] Fair- alert and oriented, doesn't know medications without list, cannot answer specifics about medications, but has a decent process for which to take at home  [] Poor- does not know medications, may not have a process to take at home, may be cognitively unable to review at this time  [x]Medication management done by family member or facility, no concerns about historian accuracy.   [] Did not review with patient. Cannot assess.     Medication Management:  [] Manages meds independently  [] Family member/ other party manages meds:  [x] Meds managed by staff at facility  [] Meds set up by home care, family/other party helps administer  [] Meds set up by home care, self administers  [] Did not review with patient. Cannot assess.     Other medications aside from PTA:  [x] Denies taking any medications  aside from those listed in PTA meds  [] Reports taking another medication(s) but cannot recall the name(s)  [] Refuses to say.  [] Did not review with patient. Cannot assess.     Comments: none    Roxana Munoz on 8/30/2021 at 7:39 AM       Discrepancies: [x] No []Not Applicable []Yes: listed below    Time spent on medication reconciliation:   []5-20 mins  [x]20-40 mins  []> 40 mins    Issues completing PTA medication reconciliation:  [] On hold for a long time  [] Waited for a call back  [] Fax didn't come through  [x] Fax took a long time  [] Other:    Notifying appropriate party of changes/additions/discrepancies:  [x]No pertinent changes made, notification not necessary.   [] Notified attending provider via text page/phone call  [] Notified attending provider in person  [] Notified pharmacy  [] Notified nurse  [] Attending provider not available, left detailed notes  [] Pt is not admitted to floor yet, PTA meds completed before admission.     Medications Prior to Admission   Medication Sig Dispense Refill Last Dose     apixaban ANTICOAGULANT (ELIQUIS) 5 MG tablet Take 1 tablet (5 mg) by mouth 2 times daily 30 tablet 11 Past Week at Unknown time     cimetidine (TAGAMET) 200 MG tablet Take 400 mg by mouth 2 times daily    Past Week at Unknown time     furosemide (LASIX) 20 MG tablet Take 20 mg by mouth 2 times daily   Past Week at Unknown time     glipiZIDE (GLUCOTROL XL) 5 MG 24 hr tablet Take 5 mg by mouth daily    Past Week at Unknown time     insulin glargine (LANTUS PEN) 100 UNIT/ML pen Inject 10 Units Subcutaneous At Bedtime   Past Week at Unknown time     latanoprost (XALATAN) 0.005 % ophthalmic solution Place 1 drop into both eyes At Bedtime .Wait 3-5 minutes before administering other eye drops.   Past Week at Unknown time     lisinopril (ZESTRIL) 20 MG tablet Take 20 mg by mouth daily    Past Week at Unknown time     loratadine (CLARITIN) 10 MG tablet Take 10 mg by mouth daily    Past Week at Unknown time      metFORMIN (GLUCOPHAGE-XR) 500 MG 24 hr tablet Take 1,000 mg by mouth 2 times daily (with meals)    Past Week at Unknown time     metoprolol succinate ER (TOPROL-XL) 50 MG 24 hr tablet Take 50 mg by mouth daily   Past Week at Unknown time     multivitamin (CENTRUM SILVER) tablet Take 1 tablet by mouth daily 90 tablet 3 Past Week at Unknown time     NOVOLOG FLEXPEN 100 UNIT/ML soln Inject 0-11 Units Subcutaneous 3 times daily per sliding scale:  BG< 150=0 units.   151-200=3 units,   201-250=5 units,   251-300=7 units,   301-350=9 units,   351-400 =11 units,   > 400 call nurse   Past Week at Unknown time     risperiDONE (RISPERDAL) 0.25 MG tablet Take 0.25 mg by mouth 2 times daily along with a 0.5 mg tablet for a total daily dose of 0.75 mg twice daily.   Past Week at Unknown time     simvastatin (ZOCOR) 40 MG tablet Take 40 mg by mouth At Bedtime    Past Week at Unknown time     tamsulosin (FLOMAX) 0.4 MG capsule Take 0.4 mg by mouth daily   Past Week at Unknown time     timolol maleate (TIMOPTIC) 0.5 % ophthalmic solution Place 1 drop into both eyes 2 times daily . Wait 3-5 minutes before administering other eye drops.   Past Week at Unknown time     acetaminophen (TYLENOL) 325 MG tablet Take 325 mg by mouth every 4 hours as needed for mild pain   Unknown at Unknown time     albuterol (PROVENTIL) (2.5 MG/3ML) 0.083% neb solution Take 1 vial (2.5 mg) by nebulization every 4 hours as needed for shortness of breath / dyspnea 3 mL 0 Unknown at Unknown time     blood glucose (NO BRAND SPECIFIED) test strip Use to test blood sugar 4 times daily or as directed. 100 strip 11 Unknown at Unknown time     blood glucose monitoring (NO BRAND SPECIFIED) meter device kit Use to test blood sugar 4 times daily or as directed. 1 kit 0 Unknown at Unknown time     nystatin-triamcinolone (MYCOLOG) 106987-9.1 UNIT/GM-% external ointment Apply topically 2 times daily as needed prn    Unknown at Unknown time     risperiDONE (RISPERDAL)  0.5 MG tablet Take 0.5 mg by mouth 2 times daily along with a 0.25 mg tablet for a total daily dose of 0.75 mg twice daily.        ULTICARE 29G X 12.7MM insulin pen needle Use daily as directed.   Unknown at Unknown time

## 2021-08-30 NOTE — PHARMACY
Pharmacy Antimicrobial Stewardship Assessment     Current Antimicrobial Therapy:  Anti-infectives (From now, onward)    Start     Dose/Rate Route Frequency Ordered Stop    08/29/21 1630  cefTRIAXone IN D5W (ROCEPHIN) intermittent infusion 2 g      2 g  100 mL/hr over 30 Minutes Intravenous EVERY 24 HOURS 08/29/21 1448      08/29/21 1500  azithromycin (ZITHROMAX) 500 mg in sodium chloride 0.9 % 250 mL intermittent infusion      500 mg  over 1 Hours Intravenous EVERY 24 HOURS 08/29/21 1448          Indication: CAP    Days of Therapy: 2     Pertinent Labs:  WBC Count   Date Value Ref Range Status   08/30/2021 7.1 4.0 - 11.0 10e3/uL Final   08/29/2021 10.3 4.0 - 11.0 10e3/uL Final   07/31/2021 6.3 4.0 - 11.0 10e3/uL Final     Procalcitonin   Date Value Ref Range Status   08/29/2021 <0.05 <5.00 ng/mL Final     Comment:     Interpretation and Recommendations  <0.05 ng/mL Normal  Very low risk of bacterial infection.  Strongly discourage antibiotics.    0.05-0.24 ng/mL Low risk of systemic infection. Local bacterial infection possible.  Assess other clinical features of infection.  Discourage antibiotics.    0.25-0.49 ng/mL Possible early systemic infection or localized infection  Encourage antibiotics only in correct clinical context.  Consider obtaining blood cultures or other relevant cultures.  Recheck PCT in 6-12 hours to ensure baseline low level.  If repeat PCT is rising, consider early systemic infection and consider starting antibiotics.    0.50-1.99 ng/mL Moderate risk of systemic infection.  Recommend antibiotics.  Evaluate culture results and clinical features to target antibacterial therapy.  Obtain blood cultures and other relevant cultures if not done.    If empiric antibiotics were started, recheck PCT in:       2 days to guide antibiotic de-escalation.       Discontinue or de-escalate antibiotics when PCT concentration is <80% of      peak or abs PCT <0.5.    If empiric antibiotics were NOT started,  recheck PCT in:       6-24 hours to re-evaluate need for antibiotics.     2.00-9.99 g/mL High risk for progression to severe sepsis.  Strongly recommend initiating or continuing antibiotics.  Evaluate culture results and clinical features to target antibacterial therapy.  Obtain blood cultures and other relevant cultures if not done.  Repeat PCT in 2 days to guide antibiotic de-escalation.  Consider de-escalating antibiotics when PCT concentration is <80% or peak or abs PCT < 0.05.    Greater than or equal to 10 ng/mL Very high likelihood of severe sepsis or septic shock.  Strongly recommend initiating or continuing antibiotics.  Evaluate culture results and clinical features to target antibacterial therapy.  Obtain blood cultures and other relevant cultures if not done.  Repeat PCT in 2 days to guide antibiotic de-escalation.  Consider de-escalating antibiotics when PCT concentration is <80% of peak or abs PCT < 1.     09/11/2020 <0.05 ng/ml Final     Comment:     <0.05 ng/ml  Normal  Recommendation: Very low risk of bacterial infection.   Discourage antibiotics unless strong clinical suspicion for serious infection.       No results found for: CRP    Culture Results:   Collected Updated Procedure Result Status    08/29/2021 1648 08/30/2021 0655 Blood Culture Peripheral Blood [[id number]]   Peripheral Blood    Preliminary result Component Value   Culture No growth after 12 hours P              08/29/2021 1318 08/29/2021 1412 Symptomatic COVID-19 Virus (Coronavirus) by PCR Nasopharyngeal [[id number]]    Swab from Nasopharyngeal    Final result Component Value   SARS CoV2 PCR Negative   NEGATIVE: SARS-CoV-2 (COVID-19) RNA not detected, presumed negative.         Recommendations/Interventions:  1. None at this time    Deloris Heath, [facility]  August 30, 2021

## 2021-08-30 NOTE — PROGRESS NOTES
Received call back from Isabel with [facility] stating they could admit Tuesday or Friday.      Called [facility] to inquire about stretcher transport back to [facility] tomorrow.  Awaiting call back.      Received call from Jayy RN at [facility] about the need for shaylee.  She expressed concern for staffing and their ability to take him advising that they would not be able to accommodate d/t staffing.   Updated Florentin's sister, Erinn.    Pt/family was provided with the Medicare Compare list for SNF.  Discussed associated Medicare star ratings to assist with choice for referrals/discharge planning Yes    Education was given to pt/family that star ratings are updated/maintained by Medicare and can be reviewed by visiting www.medicare.gov Yes    Patient/family choices for facility in priority are:   First Choice : Skilled Nursing Facility [place]: [facility]     [phone]; message left    Second Choice: Skilled Nursing Facility [place]: [facility]    [phone]; message left    Third Choice: Skilled Nursing Facility [place]: [facility]         [phone]; message left

## 2021-08-30 NOTE — PROGRESS NOTES
[facility]    Hospitalist Progress Note    Date of Service (when I saw the patient): 08/30/2021    Assessment & Plan   Florentin Reyes is a 80 year old male who was admitted on 8/29/2021.     Comfort care: Multifactorial, but gradual decline and end stages of dementia contributing the most.  Patient is from assisted living facility/group home.  Combination of renal failure, volume overload in the setting of systolic heart failure, possible infectious etiology not ruled out yet.  Profound hypoglycemia on admission.  Procalcitonin undetectable.  Afebrile, no significant leukocytosis.  -Spoke with sister Erinn this AM about poor prognosis, she is in agreement with comfort cares at this point     Hypoglycemia: Patient has a history of insulin-dependent diabetes mellitus, he is on 10 units Lantus daily as well as mealtime sliding scale.  Patient reported to have blood sugar in the 30s at assisted living today.  This was refractory to glucose paste.  D10 drip started in emergency department.  Patient's insulin is administered by the assisted living, doubt insulin overdose.  Possible occult infectious process.  -Continue D10 drip     CVS: Patient has history of systolic left-sided heart failure with ejection fraction 30 to 35% as seen on echocardiogram from March 2021.  He also has significant left ventricular hypertrophy.  Patient takes 20 mg of Lasix daily.  Patient has chronic atrial fibrillation on Eliquis.  Slight troponin leak to 0.053, likely demand ischemia.  BNP is 4000, has significant lower extremity edema.  Chest x-ray is consistent with pulmonary edema as well.  Acute kidney injury as well, possible cardiorenal syndrome.  Lactate 4.5 could indicate hypoperfusion in the setting of CHF.  -initiating comfort cares this AM     Acute kidney injury: admission Creatinine 2.63 with GFR of 22.  In July 2021, patient's creatinine was 0.86 with a GFR of 83.  BNP is over 4000, patient does appear volume  overloaded with lower extremity edema as well as chest x-ray with bilateral infiltrates.  Suggestive of cardiorenal syndrome.  Worsening.  -comfort cares as above     Hyperlipidemia: Continue home Zocor     Dementia with behavioral disturbances: Patient is on Risperdal, but not able to take po currently.     FEN: Slight hyponatremia, suggesting mild free water deficit.  Patient is on D10.     Chronic urinary catheter: Appears to be a condom catheter attached to a leg bag.  -UA without pyuria.    DVT Prophylaxis: DOAC    Code Status: No CPR- Do NOT Intubate    Disposition: Expected discharge once SW/palliative care can arrange outpatient services.      Kriss Rudolph MD    Interval History   Patient seen in room.  Non-verbal, stirs to stimuli but not interactive.    -Data reviewed today: I reviewed all new labs and imaging results over the last 24 hours. I personally reviewed no images or EKG's today.    Physical Exam   Temp: 97.6  F (36.4  C) Temp src: Tympanic BP: 104/59 Pulse: 89   Resp: 20 SpO2: 92 % O2 Device: Nasal cannula Oxygen Delivery: 3 LPM  Vitals:    08/29/21 1550   Weight: 113.2 kg (249 lb 9 oz)     Vital Signs with Ranges  Temp:  [95.4  F (35.2  C)-97.6  F (36.4  C)] 97.6  F (36.4  C)  Pulse:  [45-89] 89  Resp:  [11-25] 20  BP: ()/(35-73) 104/59  SpO2:  [80 %-99 %] 92 %    Intake/Output Summary (Last 24 hours) at 8/30/2021 0901  Last data filed at 8/30/2021 0642  Gross per 24 hour   Intake 3550 ml   Output 750 ml   Net 2800 ml       Peripheral IV 08/29/21 Left Hand (Active)   Site Assessment WDL 08/30/21 0200   Line Status Saline locked 08/30/21 0200   Dressing Intervention New dressing  08/29/21 1159   Phlebitis Scale 0-->no symptoms 08/30/21 0200   Infiltration Scale 0 08/30/21 0200   Number of days: 1       Peripheral IV 08/29/21 Anterior;Left Wrist (Active)   Site Assessment WDL 08/30/21 0200   Line Status Infusing 08/30/21 0200   Phlebitis Scale 0-->no symptoms 08/30/21 0200   Infiltration  Scale 0 08/30/21 0200   Number of days: 1       Urethral Catheter (Active)   Tube Description Positional 08/30/21 0104   Catheter Care Done;Catheter wipes 08/29/21 1650   Collection Container Standard 08/30/21 0104   Securement Method Securing device (Describe) 08/30/21 0104   Rationale for Continued Use Retention 08/30/21 0104   Urine Output 200 mL 08/30/21 0415   Number of days: 1     No line/device    Constitutional - unresponsive, elderly gentleman with eyes closed  HEENT - atraumatic, normocephalic  Neck - supple, no masses, no JVD  CVS - S1 S2 RRR, no murmurs, rubs, gallops  Respiratory - coarse b/s bl  GI - soft, NT, ND, + bowel sounds, no organomegaly  Musculoskeletal - no LE edema, no lesions  Neuro - unreponsive    Medications     dextrose 10% 125 mL/hr at 08/30/21 0820     - MEDICATION INSTRUCTIONS -         [Held by provider] apixaban ANTICOAGULANT  5 mg Oral BID     [Held by provider] cimetidine  400 mg Oral BID     [Held by provider] insulin aspart  1-5 Units Subcutaneous At Bedtime     insulin aspart  1-7 Units Subcutaneous Q4H     latanoprost  1 drop Both Eyes At Bedtime     [Held by provider] loratadine  10 mg Oral Daily     [Held by provider] simvastatin  40 mg Oral At Bedtime     sodium chloride (PF)  3 mL Intracatheter Q8H     [Held by provider] tamsulosin  0.4 mg Oral Daily     timolol maleate  1 drop Both Eyes BID       Data   Recent Labs   Lab 08/30/21  0620 08/30/21  0548 08/30/21  0506 08/29/21  1158   WBC  --  7.1  --  10.3   HGB  --  10.5*  --  12.8*   MCV  --  93  --  94   PLT  --  56*  --  72*   INR  --  1.84*  --  2.38*   NA  --  144  --  148*   POTASSIUM  --  4.3  --  4.5   CHLORIDE  --  116*  --  116*   CO2  --  24  --  25   BUN  --  59*  --  59*   CR  --  3.08*  --  2.63*   ANIONGAP  --  4  --  7   DANIELLE  --  8.6  --  9.9   GLC 94 121* 66* 66*   ALBUMIN  --   --   --  2.8*   PROTTOTAL  --   --   --  6.8   BILITOTAL  --   --   --  0.5   ALKPHOS  --   --   --  104   ALT  --   --   --  34    AST  --   --   --  39   TROPONIN  --   --   --  0.053*     Lactic Acid   Date Value Ref Range Status   08/29/2021 4.0 (HH) 0.7 - 2.0 mmol/L Final   08/29/2021 4.5 (HH) 0.7 - 2.0 mmol/L Final   03/22/2021 3.3 (H) 0.7 - 2.0 mmol/L Final     Comment:     Significant value called to and read back by  Kanika Motley at 2205 by SG     03/22/2021 2.8 (H) 0.7 - 2.0 mmol/L Final     Comment:     Significant value called to and read back by  Angela Miller at 1413 by SG     09/11/2020 2.6 (H) 0.7 - 2.0 mmol/L Final     Comment:     Significant value called to and read back by  SIERRA WILLARD AT 2309 ON 9/11/20 BY GILBERT         Recent Results (from the past 24 hour(s))   XR Chest Port 1 View    Narrative    XR CHEST PORT 1 VIEW    HISTORY: 80 yearsMale 80-year-old male with complaints of hypoglycemia  and cough.  Rule out pneumonia    TECHNIQUE: A single view of the chest was performed    COMPARISON: 30/1/2021    FINDINGS: Lung volumes are low. There is bilateral groundglass  airspace opacity. Appearance is similar to prior study.        Impression    IMPRESSION: Low lung volumes. Bilateral round glass opacity is again  seen, similar to that seen previously. Suspect pulmonary edema.  Pneumonia, such as viral pneumonia, could have a similar appearance.    JANE MEI MD         SYSTEM ID:  [id number]       Kriss Rudolph MD

## 2021-08-30 NOTE — CONSULTS
Pt evaluated per WOC consult order placed by Dr. Rudolph for PI's to feet/ankle and sacrum.    Sacrum has DTPI with ridging from chronic pressure, there is a superficial skin abrasion to the L sacrum.          2 DTI's the lateral L foot, no fluctuance beneath          DTI's to the R lateral foot the one most distal is fluid filled (measures 2.5x1.0cm) and the 5th toe has fluid filled blister          R lateral malleolus sanguinous filled blood blister (measures 1.5x1.2cm) with blanchable periwound erythema without excessive warmth      Recommend the following for tx:  Sacral mepilex change every 3 days and PRN  Keep pressure off of pressure areas with use of pillows or heel protectors  Continue repositioning schedule

## 2021-08-30 NOTE — PROVIDER NOTIFICATION
MD present to check on patient. Reviewed current vital signs. Still no urine out in neves. Sister, Erinn, called and updated with current patient condition. MD did speak with her as well.

## 2021-08-30 NOTE — PLAN OF CARE
See provider notification notes from this shift. Patient is declining, he responds with resistence to touching his arms and blood pressure cuff inflation but not much else. Blood pressures have decreased throughout the shift. Temp continues to be around 96.0 so shadi hugger remains on at medium setting. Telemetry A-Fib 40's-50's per ICU written report. O2 sat WNL @ 2 LPM. Lung sounds are coarse/rhonchi throughout. Audible congestion at times. He can occasionally cough but it's been very weak for the most part. Patient has also become apneic with periods lasting 30-40 seconds. MD has been updated throughout the shift, they have come to see patient. Accu checks as charted, last being 142. D10 infusion continues. Novolog not given due to continued D10 infusion. Alford catheter placed at the beginning of the shift with immediate return of 550 ml brittany urine. Bumex IV given with no real urine output since the initial 550 ml. 1 liter NS bolus given over 2 hours. Turn Q2H. Buttocks and coccyx areas have areas of questionable pressure ulcers, right buttock has some open areas. Barrier cream with turns. Edematous from hips down to feet. Scrotum is quite swollen, taut. Due to patient's size we have been unsuccessful attempting to elevate scrotum or increase pressure from legs on it. 2 questionable pressure injuries on left lateral foot as well, it is hard to try to elevate foot and heel due to location of questionable pressure injuries. Oral cares have not been successful due to patient biting down. Alarms in place. Call light within reach. IV antibiotics continue. MD did speak with sister to update her on patient condition towards the end of the shift.     Face to face report given with opportunity to observe patient.    Report given to Kaitlyn LEON.    Corazon Damian RN   8/30/2021  12:53 AM

## 2021-08-30 NOTE — PLAN OF CARE
Unresponsive.  Opens eyes or yawns at times.  Does not follow directions.  Lungs diminished.  Edema from waist to feet.  Sacral dressing applied to protect nonblanchable areas on coccyx and sacrum.  Pressure relief boots applied bilaterally.  Blisters and discolored areas on lateral aspects of bilateral feet.  Turned and repositioned q2h.  Respirations shallow and unlabored, irregular at times.  FLACC 0.  Orders for comfort cares.  Awaiting discharge plans for hospice.  Alford intact.        Face to face report given with opportunity to observe patient.    Report given to Corazon Barton RN   8/30/2021  3:34 PM

## 2021-08-31 NOTE — PROGRESS NOTES
Received call from [facility] advising not able to take admissions today or tomorrow.  [facility] declined d/t staffing.  Currently under review at [facility].    Requesting US Right breast  Order was previously faxed 10/10/18 to [facility], per Cherelle Bajwa, order was received no other orders needed at this time.     Future Appointments   Date Time Provider Judson Iverson   10/30/2018  2:00 PM Peyman Ang MD [facility] [address]

## 2021-08-31 NOTE — PLAN OF CARE
Pt appears to be resting comfortably. Does have periods of apnea, timed about 35 seconds of apnea making RR 9-10 over one minutes. Heel boots on and elevated. Turned q2h. IV continues to infuse D10 @ 125mL/hr. Alford in place and patent. Sacral foam dressing CDI. Alarms active and audible, room door open near nurse's station.     Face to face report given with opportunity to observe patient.    Report given to Jaz, RNs    Yuliana Uribe RN   8/31/2021  7:15 AM

## 2021-08-31 NOTE — PLAN OF CARE
Patient has been unresponsive for the most part all shift. He does cough and moan out occasionally. Patient is resistive with cares at times but not all the time. O2 @ 2 LPM NC continues. He continues to have apneic periods of 30-45 seconds and then starts breathing again. No audible congestion noted like yesterday evening. Turn and repo Q2H. Heel boots on. Alford catheter remains in place. Alford output adequate. IVF continues. Ring from left hand removed due to increased swelling in fingers. Ring in room.    Face to face report given with opportunity to observe patient.    Report given to Yuliana LEON.    Corazon Damian RN   8/31/2021  12:00 AM

## 2021-08-31 NOTE — PROGRESS NOTES
[facility]    Hospitalist Progress Note    Date of Service (when I saw the patient): 08/31/2021    Assessment & Plan   Florentin Reyes is a 80 year old male who was admitted on 8/29/2021.     Comfort care: Multifactorial, but gradual decline and end stages of dementia contributing the most.  Patient is from assisted living facility/group home.  Combination of renal failure, volume overload in the setting of systolic heart failure, possible infectious etiology not ruled out yet.  Profound hypoglycemia on admission.  Procalcitonin undetectable.  Afebrile, no significant leukocytosis.  -Continue comfort care     Hypoglycemia: Patient has a history of insulin-dependent diabetes mellitus, he is on 10 units Lantus daily as well as mealtime sliding scale.  Patient reported to have blood sugar in the 30s at assisted living today.  This was refractory to glucose paste.  D10 drip started in emergency department.  Patient's insulin is administered by the assisted living, doubt insulin overdose.  Possible occult infectious process.     CVS: Patient has history of systolic left-sided heart failure with ejection fraction 30 to 35% as seen on echocardiogram from March 2021.  He also has significant left ventricular hypertrophy.  Patient takes 20 mg of Lasix daily.  Patient has chronic atrial fibrillation on Eliquis.  Slight troponin leak to 0.053, likely demand ischemia.  BNP is 4000, has significant lower extremity edema.  Chest x-ray is consistent with pulmonary edema as well.  Acute kidney injury as well, possible cardiorenal syndrome.  Lactate 4.5 could indicate hypoperfusion in the setting of CHF.  -comfort care     Acute kidney injury: admission Creatinine 2.63 with GFR of 22.  In July 2021, patient's creatinine was 0.86 with a GFR of 83.  BNP is over 4000, patient does appear volume overloaded with lower extremity edema as well as chest x-ray with bilateral infiltrates.  Suggestive of cardiorenal syndrome.   Worsening.  -comfort cares as above     Hyperlipidemia: Continue home Zocor     Dementia with behavioral disturbances: Patient is on Risperdal, but not able to take po currently.     FEN: Slight hyponatremia, suggesting mild free water deficit.  Patient is on D10.     Chronic urinary catheter: Appears to be a condom catheter attached to a leg bag.  -UA without pyuria.    DVT Prophylaxis: DOAC    Code Status: No CPR- Do NOT Intubate    Disposition: Expected discharge once SW/palliative care can arrange outpatient services, likely at SNF.      Kriss Rudolph MD    Interval History   Patient seen in room.  Non-verbal, stirs to stimuli but not interactive.    -Data reviewed today: I reviewed all new labs and imaging results over the last 24 hours. I personally reviewed no images or EKG's today.    Physical Exam   Temp: (!) 96.4  F (35.8  C) Temp src: Tympanic  Pulse: 74   Resp: (!) 11 SpO2: 97 % O2 Device: Nasal cannula Oxygen Delivery: 2 LPM  Vitals:    08/29/21 1550   Weight: 113.2 kg (249 lb 9 oz)     Vital Signs with Ranges  Temp:  [96.4  F (35.8  C)-98.3  F (36.8  C)] 96.4  F (35.8  C)  Pulse:  [74-87] 74  Resp:  [9-22] 11  SpO2:  [93 %-97 %] 97 %      Intake/Output Summary (Last 24 hours) at 8/31/2021 1103  Last data filed at 8/31/2021 0819  Gross per 24 hour   Intake 3209 ml   Output 925 ml   Net 2284 ml       Peripheral IV 08/29/21 Anterior;Left Wrist (Active)   Site Assessment WDL 08/31/21 0830   Line Status Infusing 08/31/21 0830   Phlebitis Scale 0-->no symptoms 08/31/21 0830   Infiltration Scale 0 08/31/21 0830   Number of days: 2       Urethral Catheter (Active)   Tube Description Positional 08/31/21 0207   Catheter Care Catheter wipes;Done 08/31/21 0830   Collection Container Standard 08/31/21 0207   Securement Method Securing device (Describe) 08/31/21 0207   Rationale for Continued Use End of Life 08/31/21 0207   Urine Output 350 mL 08/30/21 2218   Number of days: 2     No line/device    Constitutional -  unresponsive, elderly gentleman with eyes closed, no distress  HEENT - atraumatic, normocephalic  Neck - supple, no masses, no JVD  CVS - S1 S2 RRR, no murmurs, rubs, gallops  Respiratory - coarse b/s bl  GI - soft, NT, ND, + bowel sounds, no organomegaly  Musculoskeletal - no LE edema, no lesions  Neuro - unreponsive    Medications     dextrose 10% 125 mL/hr at 08/31/21 0901     - MEDICATION INSTRUCTIONS -         [Held by provider] apixaban ANTICOAGULANT  5 mg Oral BID     [Held by provider] famotidine  20 mg Oral BID     [Held by provider] insulin aspart  1-5 Units Subcutaneous At Bedtime     latanoprost  1 drop Both Eyes At Bedtime     [Held by provider] loratadine  10 mg Oral Daily     [Held by provider] simvastatin  40 mg Oral At Bedtime     sodium chloride (PF)  3 mL Intracatheter Q8H     [Held by provider] tamsulosin  0.4 mg Oral Daily     timolol maleate  1 drop Both Eyes BID       Data   Recent Labs   Lab 08/30/21  0620 08/30/21  0548 08/30/21  0506 08/29/21  1158   WBC  --  7.1  --  10.3   HGB  --  10.5*  --  12.8*   MCV  --  93  --  94   PLT  --  56*  --  72*   INR  --  1.84*  --  2.38*   NA  --  144  --  148*   POTASSIUM  --  4.3  --  4.5   CHLORIDE  --  116*  --  116*   CO2  --  24  --  25   BUN  --  59*  --  59*   CR  --  3.08*  --  2.63*   ANIONGAP  --  4  --  7   DANIELLE  --  8.6  --  9.9   GLC 94 121* 66* 66*   ALBUMIN  --   --   --  2.8*   PROTTOTAL  --   --   --  6.8   BILITOTAL  --   --   --  0.5   ALKPHOS  --   --   --  104   ALT  --   --   --  34   AST  --   --   --  39   TROPONIN  --   --   --  0.053*     Lactic Acid   Date Value Ref Range Status   08/29/2021 4.0 (HH) 0.7 - 2.0 mmol/L Final   08/29/2021 4.5 (HH) 0.7 - 2.0 mmol/L Final   03/22/2021 3.3 (H) 0.7 - 2.0 mmol/L Final     Comment:     Significant value called to and read back by  Kanika Motley at 2205 by SG     03/22/2021 2.8 (H) 0.7 - 2.0 mmol/L Final     Comment:     Significant value called to and read back by  Angela Miller at 1413 by SG      09/11/2020 2.6 (H) 0.7 - 2.0 mmol/L Final     Comment:     Significant value called to and read back by  SIERRA WILLARD AT 2309 ON 9/11/20 BY GILBERT         No results found for this or any previous visit (from the past 24 hour(s)).    Kriss Rudolph MD

## 2021-08-31 NOTE — PLAN OF CARE
Unresponsive.  Opens eyes at times.  Unable to follow commands.  No verbalizing.  Lungs diminished.  Respirations unlabored.  30-35 seconds of apnea at times.  Weaned to room air, 90-97%.  Interdry placed to groin.  Alford intact with clear yellow urine.  FLACC 0 at rest.  Foam dressing intact to coccyx.  Heel protectors to both feet.  Turned q2h.  STAT lock removed from thigh, catheter pulling - taped instead.  Discolorations to lateral aspects of feet, improving since heel protectors placed.  Intact blisters to Rt foot.    Face to face report given with opportunity to observe patient.    Report given to Corazon Barton RN   8/31/2021  3:44 PM

## 2021-08-31 NOTE — PROGRESS NOTES
Spoke with Miriam at [facility].  Reviewing insurance information, some question on coverage and looking further into this.  Per conversation with Jenny Jacobson, [facility] [place], Florentin should have coverage.

## 2021-09-01 NOTE — PROGRESS NOTES
Spoke with Miriam at [facility].  They were able to sort out coverage and accept Florentin for admission tomorrow, 9/2/21.  Healthline to  at 2pm.  Updated sister, Erinn. Updated care team.  Message left for Jenny Jacobson.  Updated Bridget Grullon.

## 2021-09-01 NOTE — PLAN OF CARE
Patient was initially unresponsive at the beginning of the shift but as shift progressed he did start moving his head more and did open his eyes some. He also moaned out with turn. Roxanol 5 mg then 10 mg was given this shift. Continues to be apneic for 30-45 seconds. No audible congestion. Alford catheter remains in place. Scrotum continues to be quite swollen. Dressing to coccyx remains in place. Continues to be turned Q2H. Oral cares being done every 2 hours if patient allows and doesn't bite down on swab. Heel boots on. Alarms in place. Call light within reach. IV access lost this shift.     Face to face report given with opportunity to observe patient.    Report given to Yuliana LEON.    Corazon Damian RN   8/31/2021  11:49 PM

## 2021-09-01 NOTE — PLAN OF CARE
Pt is unresponsive. With turns, pt opens his eyes at times. Occasional grimace with cares. No signs of air hunger. Continues to have periods of apnea lasting 30-40 seconds. Alford in place with 500mL of output. Repositioning and oral cares q2h. Alarms active and audible.    Face to face report given with opportunity to observe patient.    Report given to Gayla Fatima, Joselyn Uribe RN   9/1/2021  7:24 AM

## 2021-09-01 NOTE — PLAN OF CARE
Unresponsive.  Opens eyes at times.  Does not flow direction.  FLACC 0.  Lungs clear.  Pulse irregular.  Edema present from waist to feet.  Heel protectors in place.  Redness decreasing on lateral aspects of feet.  Blisters remain intact. Sacral foam dressing clean, dry and intact.  Periods of apnea of 30-35 seconds, episodes irregular in nature.  No IV.  Alford with clear brittany urine.  [facility]  Warm to touch.  No mottling noted.      Face to face report given with opportunity to observe patient.    Report given to Caleb Barton RN   9/1/2021  3:10 PM

## 2021-09-01 NOTE — PROGRESS NOTES
[facility]    Hospitalist Progress Note    Date of Service (when I saw the patient): 09/01/2021    Assessment & Plan   Florentin Reyes is a 80 year old male who was admitted on 8/29/2021.     Comfort care: Multifactorial, but gradual decline and end stages of dementia contributing the most.  Patient is from assisted living facility/group home.  Combination of renal failure, volume overload in the setting of systolic heart failure, possible infectious etiology not ruled out yet.  Profound hypoglycemia on admission.  Procalcitonin undetectable.  Afebrile, no significant leukocytosis.  -Continue comfort care     Hypoglycemia: Patient has a history of insulin-dependent diabetes mellitus, he is on 10 units Lantus daily as well as mealtime sliding scale.  Patient reported to have blood sugar in the 30s at assisted living today.  This was refractory to glucose paste.  D10 drip started in emergency department.  Patient's insulin is administered by the assisted living, doubt insulin overdose.  Possible occult infectious process.     CVS: Patient has history of systolic left-sided heart failure with ejection fraction 30 to 35% as seen on echocardiogram from March 2021.  He also has significant left ventricular hypertrophy.  Patient takes 20 mg of Lasix daily.  Patient has chronic atrial fibrillation on Eliquis.  Slight troponin leak to 0.053, likely demand ischemia.  BNP is 4000, has significant lower extremity edema.  Chest x-ray is consistent with pulmonary edema as well.  Acute kidney injury as well, possible cardiorenal syndrome.  Lactate 4.5 could indicate hypoperfusion in the setting of CHF.  -comfort care     Acute kidney injury: admission Creatinine 2.63 with GFR of 22.  In July 2021, patient's creatinine was 0.86 with a GFR of 83.  BNP is over 4000, patient does appear volume overloaded with lower extremity edema as well as chest x-ray with bilateral infiltrates.  Suggestive of cardiorenal syndrome.   Worsening.  -comfort cares as above     Hyperlipidemia: Continue home Zocor     Dementia with behavioral disturbances: Patient is on Risperdal, but not able to take po currently.     FEN: Slight hypernatremia, suggesting mild free water deficit.  Patient is on D10.     Chronic urinary catheter: Appears to be a condom catheter attached to a leg bag.  -UA without pyuria.    DVT Prophylaxis: DOAC    Code Status: No CPR- Do NOT Intubate    Disposition: Expected discharge once SW/palliative care can arrange outpatient services, likely at SNF.      Kriss Rudolph MD    Interval History   Patient seen in room.  Non-verbal, stirs to stimuli but not interactive.  Long apneic spells.    -Data reviewed today: I reviewed all new labs and imaging results over the last 24 hours. I personally reviewed no images or EKG's today.    Physical Exam   Temp: 97.1  F (36.2  C) Temp src: Tympanic  Pulse: 73   Resp: (!) 8 (periods of apnea) SpO2: 96 % O2 Device: None (Room air)    Vitals:    08/29/21 1550   Weight: 113.2 kg (249 lb 9 oz)     Vital Signs with Ranges  Temp:  [96.4  F (35.8  C)-97.1  F (36.2  C)] 97.1  F (36.2  C)  Pulse:  [73-79] 73  Resp:  [8-12] 8  SpO2:  [93 %-96 %] 96 %        Urethral Catheter (Active)   Tube Description Leaking 09/01/21 0046   Catheter Care Soap and water/perineal cleanser 09/01/21 1005   Collection Container Standard 09/01/21 0046   Securement Method Securing device (Describe) 09/01/21 0046   Rationale for Continued Use End of Life 09/01/21 0830   Urine Output 200 mL 09/01/21 1005   Number of days: 3     No line/device    Constitutional - unresponsive, elderly gentleman with eyes closed, no distress  Neuro - unreponsive    Medications     - MEDICATION INSTRUCTIONS -         [Held by provider] apixaban ANTICOAGULANT  5 mg Oral BID     [Held by provider] famotidine  20 mg Oral BID     [Held by provider] insulin aspart  1-5 Units Subcutaneous At Bedtime     [Held by provider] loratadine  10 mg Oral Daily      [Held by provider] simvastatin  40 mg Oral At Bedtime     sodium chloride (PF)  3 mL Intracatheter Q8H     [Held by provider] tamsulosin  0.4 mg Oral Daily       Data   Recent Labs   Lab 08/30/21  0620 08/30/21  0548 08/30/21  0506 08/29/21  1158   WBC  --  7.1  --  10.3   HGB  --  10.5*  --  12.8*   MCV  --  93  --  94   PLT  --  56*  --  72*   INR  --  1.84*  --  2.38*   NA  --  144  --  148*   POTASSIUM  --  4.3  --  4.5   CHLORIDE  --  116*  --  116*   CO2  --  24  --  25   BUN  --  59*  --  59*   CR  --  3.08*  --  2.63*   ANIONGAP  --  4  --  7   DANIELLE  --  8.6  --  9.9   GLC 94 121* 66* 66*   ALBUMIN  --   --   --  2.8*   PROTTOTAL  --   --   --  6.8   BILITOTAL  --   --   --  0.5   ALKPHOS  --   --   --  104   ALT  --   --   --  34   AST  --   --   --  39   TROPONIN  --   --   --  0.053*     Lactic Acid   Date Value Ref Range Status   08/29/2021 4.0 (HH) 0.7 - 2.0 mmol/L Final   08/29/2021 4.5 (HH) 0.7 - 2.0 mmol/L Final   03/22/2021 3.3 (H) 0.7 - 2.0 mmol/L Final     Comment:     Significant value called to and read back by  Kanika Motley at 2205 by SG     03/22/2021 2.8 (H) 0.7 - 2.0 mmol/L Final     Comment:     Significant value called to and read back by  Angela Miller at 1413 by SG     09/11/2020 2.6 (H) 0.7 - 2.0 mmol/L Final     Comment:     Significant value called to and read back by  SIERRA WILLARD AT 2309 ON 9/11/20 BY GILBERT         No results found for this or any previous visit (from the past 24 hour(s)).    Kriss Rudolph MD

## 2021-09-02 NOTE — PROGRESS NOTES
PAS-RR    Per DHS regulation, CTS team completed and submitted PAS-RR to [place] [employer] via the [employer]. CTS team advised SNF and they are aware a PAS-RR has been submitted.     CTS team reviewed with pt or health care agent that they may be contacted for a follow up appointment within 10 days of hospital discharge if SNF stay is less than 30 days. Contact information for [employer] was also provided.     Pt or health care agent verbalized understanding.     PAS-RR # [id number]      Name: Florentin Reyes    MRN#: [id number]    Reason for Hospitalization: Hypoglycemia [E16.2];Congestive heart failure, unspecified HF chronicity, unspecified heart failure type (H) [I50.9]    GORDON: medium    Discharge Date: 9/2/2021    Patient / Family response to discharge plan: agreeable     Follow-Up Appt: No future appointments.    Other Providers (Care Coordinator, County Services, PCA services etc): Yes: orders faxed to [facility]    Discharge Disposition: nursing home- [facility] via Healthline at 1400.    CLIFFORD Maloney

## 2021-09-02 NOTE — PLAN OF CARE
Pt appears to by somewhat more responsive tonight than previous nights. Pt opens eyes and will make eye contact with cares. When asked if patient was in pain, pt shook his head. Completing oral cares, pt opened his mouth when asked to do so. Pt was moving arms and hands around more as well. Continues to have apenic periods 30-40 seconds. No indications of air hunger. Alarms active and audible. Call light remains in reach.     Face to face report given with opportunity to observe patient.    Report given to EDWARD Fontanez RN   9/2/2021  7:08 AM

## 2021-09-02 NOTE — PLAN OF CARE
"Pt awake this AM, able to state \"no\" when asked if he was in pain. Plan to discharge with hospice consult today.      Face to face report given with opportunity to observe patient.    Report given to Adam D RN Annelyse J. Stromberg, RN   9/2/2021  10:31 AM      "

## 2021-09-02 NOTE — DISCHARGE SUMMARY
[facility]    Discharge Summary  Hospitalist    Date of Admission:  8/29/2021  Date of Discharge:  9/2/2021  Discharging Provider: Kriss Rudolph MD  Date of Service (when I saw the patient): 09/02/21    Discharge Diagnoses   Active Problems:  End-stage dementia  Acute metabolic encephalopathy    Hypoglycemia (8/29/2021)    Congestive heart failure, unspecified HF chronicity, unspecified heart failure type (H) (8/29/2021)  Demand ischemia  Acute kidney injury  Hyperlipidemia  Atrial fibrillation  Dementia with behavioral disturbances  Mild hyponatremia      History of Present Illness   Florentin Reyes is an 80 year old male who presented with unresponsiveness.  Please see admission H+P for additional details.    Hospital Course   Florentin Reyes was admitted on 8/29/2021.  80-year-old male with history of end-stage dementia with behavioral disturbances, as well as insulin-dependent diabetes mellitus, left-sided heart failure with reduced ejection fraction, currently residing in a nursing home who presents with unexplained hypoglycemia as well as decreased level of responsiveness.  With his end-stage dementia, patient is reportedly nonverbal, full cares.  It sounds like the patient has had progressive symptoms over the last few months with decline, culminating with his hospital visit this time.  Patient was started on a D10 drip for his hypoglycemia, however no acute and readily reversible process was detected on work-up.  Spoke extensively with Erinn, patient's sister and emergency contact, who agrees with comfort care at this point.  Social work was then consulted for placement for hospice care, patient will be discharged to [facility] skilled nursing facility with hospice services.    Kriss Rudolph MD      Significant Results and Procedures   See below    Pending Results   These results will be followed up by Pradeep Fuller    Unresulted Labs Ordered in the Past 30 Days of this Admission     Date  and Time Order Name Status Description    8/29/2021  1:33 PM Blood Culture Peripheral Blood Preliminary           Code Status   Comfort Care       Primary Care Physician   Pradeep Fuller    Physical Exam   Temp: (!) 96.5  F (35.8  C) Temp src: Tympanic  Pulse: 83   Resp: 14 SpO2: 93 % O2 Device: None (Room air)    Vitals:    08/29/21 1550   Weight: 113.2 kg (249 lb 9 oz)     Vital Signs with Ranges  Temp:  [95.6  F (35.3  C)-96.5  F (35.8  C)] 96.5  F (35.8  C)  Pulse:  [83] 83  Resp:  [12-14] 14  SpO2:  [93 %] 93 %  I/O last 3 completed shifts:  In: -   Out: 1450 [Urine:1450]    Constitutional - minimally responsive, elderly gentleman with eyes closed, no distress  Neuro - minimally responsive to stimuli    Discharge Disposition   Admited to hospice care.  Condition at discharge: Terminal    Consultations This Hospital Stay   WOUND OSTOMY CONTINENCE NURSE  IP CONSULT  WOUND OSTOMY CONTINENCE NURSE  IP CONSULT  PALLIATIVE CARE ADULT IP CONSULT    Time Spent on this Encounter   I, Kriss Rudolph MD, personally saw the patient today and spent greater than 30 minutes discharging this patient.    Discharge Orders      Hospice Referral      General info for SNF    Length of Stay Estimate: Short Term Care: Estimated # of Days <30  Condition at Discharge: Terminal  Level of care:skilled   Rehabilitation Potential: Poor  Admission H&P remains valid and up-to-date: Yes  Recent Chemotherapy: N/A  Use Nursing Home Standing Orders: Yes     Follow Up and recommended labs and tests    Follow up with Nursing home physician and Hospice service per facility protocol.     Reason for your hospital stay    Decreased responsiveness, end stage dementia     Activity - Up with nursing assistance     No CPR- Do NOT Intubate     Fall precautions     Discharge Medications   Current Discharge Medication List      START taking these medications    Details   atropine 1 % ophthalmic solution Place 2 drops under the tongue every 4 hours as needed  for secretions  Qty: 5 mL, Refills: 0    Associated Diagnoses: Hospice care patient      LORazepam (ATIVAN) 1 MG tablet Place 1 tablet (1 mg) under the tongue every 3 hours as needed for anxiety  Qty: 30 tablet, Refills: 0    Associated Diagnoses: Hospice care patient      morphine sulfate (ROXANOL) 20 mg/mL (HIGH CONC) soln Place 0.25-0.5 mLs (5-10 mg) under the tongue every hour as needed  Qty: 15 mL, Refills: 0    Associated Diagnoses: Hospice care patient      ondansetron (ZOFRAN-ODT) 4 MG ODT tab Take 1 tablet (4 mg) by mouth every 6 hours as needed for nausea or vomiting  Qty: 30 tablet, Refills: 0    Associated Diagnoses: Hospice care patient         CONTINUE these medications which have NOT CHANGED    Details   blood glucose (NO BRAND SPECIFIED) test strip Use to test blood sugar 4 times daily or as directed.  Qty: 100 strip, Refills: 11    Associated Diagnoses: Type 2 diabetes mellitus without complication, with long-term current use of insulin (H)      blood glucose monitoring (NO BRAND SPECIFIED) meter device kit Use to test blood sugar 4 times daily or as directed.  Qty: 1 kit, Refills: 0    Associated Diagnoses: Type 2 diabetes mellitus without complication, with long-term current use of insulin (H)      ULTICARE 29G X 12.7MM insulin pen needle Use daily as directed.         STOP taking these medications       acetaminophen (TYLENOL) 325 MG tablet Comments:   Reason for Stopping:         albuterol (PROVENTIL) (2.5 MG/3ML) 0.083% neb solution Comments:   Reason for Stopping:         apixaban ANTICOAGULANT (ELIQUIS) 5 MG tablet Comments:   Reason for Stopping:         cimetidine (TAGAMET) 200 MG tablet Comments:   Reason for Stopping:         furosemide (LASIX) 20 MG tablet Comments:   Reason for Stopping:         glipiZIDE (GLUCOTROL XL) 5 MG 24 hr tablet Comments:   Reason for Stopping:         insulin glargine (LANTUS PEN) 100 UNIT/ML pen Comments:   Reason for Stopping:         latanoprost (XALATAN)  0.005 % ophthalmic solution Comments:   Reason for Stopping:         lisinopril (ZESTRIL) 20 MG tablet Comments:   Reason for Stopping:         loratadine (CLARITIN) 10 MG tablet Comments:   Reason for Stopping:         metFORMIN (GLUCOPHAGE-XR) 500 MG 24 hr tablet Comments:   Reason for Stopping:         metoprolol succinate ER (TOPROL-XL) 50 MG 24 hr tablet Comments:   Reason for Stopping:         multivitamin (CENTRUM SILVER) tablet Comments:   Reason for Stopping:         NOVOLOG FLEXPEN 100 UNIT/ML soln Comments:   Reason for Stopping:         nystatin-triamcinolone (MYCOLOG) 020724-4.1 UNIT/GM-% external ointment Comments:   Reason for Stopping:         risperiDONE (RISPERDAL) 0.25 MG tablet Comments:   Reason for Stopping:         risperiDONE (RISPERDAL) 0.5 MG tablet Comments:   Reason for Stopping:         simvastatin (ZOCOR) 40 MG tablet Comments:   Reason for Stopping:         tamsulosin (FLOMAX) 0.4 MG capsule Comments:   Reason for Stopping:         timolol maleate (TIMOPTIC) 0.5 % ophthalmic solution Comments:   Reason for Stopping:             Allergies   No Known Allergies  Data   Most Recent 3 CBC's:  Recent Labs   Lab Test 08/30/21  0548 08/29/21  1158 07/31/21  1414   WBC 7.1 10.3 6.3   HGB 10.5* 12.8* 12.6*   MCV 93 94 92   PLT 56* 72* 259      Most Recent 3 BMP's:  Recent Labs   Lab Test 08/30/21  0620 08/30/21  0548 08/30/21  0506 08/29/21  1158 07/31/21  1414   NA  --  144  --  148* 141   POTASSIUM  --  4.3  --  4.5 4.0   CHLORIDE  --  116*  --  116* 112*   CO2  --  24  --  25 22   BUN  --  59*  --  59* 19   CR  --  3.08*  --  2.63* 0.86   ANIONGAP  --  4  --  7 7   DANIELLE  --  8.6  --  9.9 9.1   GLC 94 121* 66* 66* 162*     Most Recent 2 LFT's:  Recent Labs   Lab Test 08/29/21  1158 07/31/21  1414   AST 39 25   ALT 34 46   ALKPHOS 104 108   BILITOTAL 0.5 0.7     Most Recent INR's and Anticoagulation Dosing History:  Anticoagulation Dose History     Recent Dosing and Labs Latest Ref Rng & Units  8/29/2021 8/30/2021    INR 0.85 - 1.15 2.38(H) 1.84(H)        Most Recent 3 Troponin's:  Recent Labs   Lab Test 08/29/21  1158 07/31/21  1414 03/23/21  1202 03/23/21  0518 03/22/21  1711   TROPI  --   --  0.045 0.047* 0.031   TROPONIN 0.053* 0.021  --   --   --      Most Recent Cholesterol Panel:No lab results found.  Most Recent 6 Bacteria Isolates From Any Culture (See EPIC Reports for Culture Details):  Recent Labs   Lab Test 10/26/20  1433 09/11/20  2130 09/11/20  2123   CULT Heavy growth  Methicillin resistant Staphylococcus aureus (MRSA)  *  Significant value called to and read back by  Nataliia Bee at 0813 on 10/28/20 by Nader العراقي    No growth after 6 days No growth after 6 days     Most Recent TSH, T4 and A1c Labs:  Recent Labs   Lab Test 08/29/21  1638 03/22/21  1405   TSH  --  3.58   A1C 5.9* 6.5*     Results for orders placed or performed during the hospital encounter of 08/29/21   XR Chest Port 1 View    Narrative    XR CHEST PORT 1 VIEW    HISTORY: 80 yearsMale 80-year-old male with complaints of hypoglycemia  and cough.  Rule out pneumonia    TECHNIQUE: A single view of the chest was performed    COMPARISON: 30/1/2021    FINDINGS: Lung volumes are low. There is bilateral groundglass  airspace opacity. Appearance is similar to prior study.        Impression    IMPRESSION: Low lung volumes. Bilateral round glass opacity is again  seen, similar to that seen previously. Suspect pulmonary edema.  Pneumonia, such as viral pneumonia, could have a similar appearance.    JANE MEI MD         SYSTEM ID:  [id number]

## 2021-09-02 NOTE — PLAN OF CARE
Pt has been unresponsive this shift except during repositioning he opens his eyes for short period, unable to speak.  He is having inconsistent periods of apnea at times- no more than 20-30 seconds.  VS as charted.  Remains on ra with SATS 93%.  We are continuing to reposition every 2H with pillows.  Heel boots are on bilaterally with legs elevated.  Alford cath in place with output as charted.  Edema is 2-3+, scrotum elevated.  Eyes are pink with small amt creamy discharge-eyes washed. Oral cares done when repositioning.  Pain assessed with FLACC scale and is 0.  Bed alarm on.   Face to face report given with opportunity to observe patient.    Report given to Yuliana Mackey RN   9/1/2021  11:12 PM

## 2021-09-02 NOTE — PLAN OF CARE
Patient lethargic and nonverbal, opens eyes at times. Appears comfortable at rest but slight moans at times with repositioning. RR 12-14 per minute with 15-20 second periods apnea. Gave PO colton with adequate pain management. Alford patent and draining adequate urine. Oral cares provided with repositioning. Foam sacral dressing remains CDI.         Patient discharged at 2:52 PM via cart accompanied by transport staff. Prescriptions sent with patient to fill . All belongings sent with patient.     Listed belongings gathered and returned to patient.    Patient discharged to [facility].   Report called to Nursing Home - Pretty LEON at 3:50 PM.    [facility]  Follow up appointment made:  No - None ordered - hospice patient  Home medications returned to patient: N/A  Patient reports pain was well managed at discharge:  Patient unable to answer

## 2021-09-07 PROBLEM — Z51.5 HOSPICE CARE PATIENT: Status: ACTIVE | Noted: 2021-01-01

## 2021-09-07 NOTE — PROGRESS NOTES
PHYSICIAN CERTIFICATION OF TERMINAL ILLNESS FOR HOSPICE BENEFIT    September 3, 2021    Florentin Reyes    1940      Effective Date of Certification    Start Date:  9/3/2021      End Date:  12/1/2021    Terminal Diagnosis:    Chronic Systolic Heart Failure      Secondary Diagnoses:     Atrial Fibrillation          Prognosis Related Comorbidities:    Diabetes Mellitus Type 2    Diabetic Foot Ulcer     Dementia          Narrative Statement:  Client suffers from end stage systolic heart failure complicated by permanent atrial fibrillation . H is on maximal tolerated medical therapy. Client resides at Skilled Nursing Facility for short term rehab and requires assistance with ADL's. Florentin scores a 50 on the Karnofsky Scale and 50 on the Palliative Performance Scale. The client is NHYA Class 3. Goals of symptom control will be met.  Because of the progressive nature of the illness, anticipated disease trajectory, as well as associated comorbidities, client qualifies for hospice care.             I certify that this patient is terminally ill and has a life expectancy of 6 months or less if the terminal illness runs its anticipated course.        Attestation:  I confirm that this narrative was composed by myself and is based on review of the medical record and/or examination of the patient.            Xavier Cat MD

## 2021-09-16 NOTE — TELEPHONE ENCOUNTER
Received PA for drug Lantus SoloStar 100Unit/ML Pen-injectors. States it was prescribed by Dr. Xavier aCt. Medication is not on patient med list. If you would like PA done. Please put in a script/order

## 2024-03-07 NOTE — ED NOTES
Bed: ED03  Expected date: 9/11/20  Expected time:   Means of arrival:   Comments:  Hold [place]  
Blood sugar 340mg/dl  
Cath ua with 600ml return clear yellow urine  
DATE:  9/11/2020   TIME OF RECEIPT FROM LAB:  2037  LAB TEST:  Lactic acid  LAB VALUE:  3.2  RESULTS GIVEN WITH READ-BACK TO (PROVIDER):  waage  TIME LAB VALUE REPORTED TO PROVIDER:   2037    
DATE:  9/11/2020   TIME OF RECEIPT FROM LAB:  2050  LAB TEST:  lactic  LAB VALUE:  2.6  RESULTS GIVEN WITH READ-BACK TO (PROVIDER):  Eloina Manuel   TIME LAB VALUE REPORTED TO PROVIDER:   [id number]    
Dr spangler notified of lactic acid of 2.6   
[facility] called to inform them of pt leaving, pt sister Erinn notified as well  
Pt back from ct, lab here  
Pt grossly inc of urine unable to void  
Pt signed AMA  
Unable to void at this time  
healthline here for transport pt assisted to wheelchair and taken out   
healthline will be approx 30mins  
Yes (specify)

## (undated) DEVICE — SENSOR-OXISENSOR II ADULT

## (undated) DEVICE — CANISTER-SUCTION 2000CC

## (undated) DEVICE — TUBING-SUCTION 20FT

## (undated) DEVICE — FORCEP-COLON BIOPSY LARGE W/NEEDLE 240CM

## (undated) DEVICE — CONNECTOR-ERBEFLO 2 PORT

## (undated) DEVICE — LUBRICANT JELLY 2OZ. TUBE

## (undated) DEVICE — IRRIGATION-H2O 1000ML

## (undated) RX ORDER — PROPOFOL 10 MG/ML
INJECTION, EMULSION INTRAVENOUS
Status: DISPENSED
Start: 2018-05-31

## (undated) RX ORDER — LIDOCAINE HYDROCHLORIDE 20 MG/ML
INJECTION, SOLUTION EPIDURAL; INFILTRATION; INTRACAUDAL; PERINEURAL
Status: DISPENSED
Start: 2018-05-31